# Patient Record
Sex: FEMALE | Race: BLACK OR AFRICAN AMERICAN | Employment: UNEMPLOYED | URBAN - NONMETROPOLITAN AREA
[De-identification: names, ages, dates, MRNs, and addresses within clinical notes are randomized per-mention and may not be internally consistent; named-entity substitution may affect disease eponyms.]

---

## 2024-11-02 ENCOUNTER — APPOINTMENT (OUTPATIENT)
Dept: GENERAL RADIOLOGY | Age: 34
DRG: 341 | End: 2024-11-02
Payer: MEDICAID

## 2024-11-02 ENCOUNTER — HOSPITAL ENCOUNTER (INPATIENT)
Age: 34
LOS: 3 days | Discharge: NURSING, REHAB OR LONG-TERM CARE | DRG: 341 | End: 2024-11-05
Attending: EMERGENCY MEDICINE | Admitting: ORTHOPAEDIC SURGERY
Payer: MEDICAID

## 2024-11-02 ENCOUNTER — APPOINTMENT (OUTPATIENT)
Dept: CT IMAGING | Age: 34
DRG: 341 | End: 2024-11-02
Payer: MEDICAID

## 2024-11-02 DIAGNOSIS — T07.XXXA ABRASIONS OF MULTIPLE SITES: ICD-10-CM

## 2024-11-02 DIAGNOSIS — S39.93XA: Primary | ICD-10-CM

## 2024-11-02 DIAGNOSIS — S32.301A CLOSED FRACTURE OF RIGHT ILIAC WING, INITIAL ENCOUNTER (HCC): ICD-10-CM

## 2024-11-02 DIAGNOSIS — S32.592A CLOSED FRACTURE OF SINGLE PUBIC RAMUS OF PELVIS, LEFT, INITIAL ENCOUNTER (HCC): ICD-10-CM
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
## 2024-11-02 DIAGNOSIS — T07.XXXA MULTIPLE CONTUSIONS: ICD-10-CM

## 2024-11-02 PROBLEM — S80.211A ABRASION OF KNEE, BILATERAL: Status: ACTIVE | Noted: 2024-11-02

## 2024-11-02 PROBLEM — S32.592S CLOSED FRACTURE OF MULTIPLE PUBIC RAMI, LEFT, SEQUELA: Status: ACTIVE | Noted: 2024-11-02

## 2024-11-02 PROBLEM — S50.312A: Status: ACTIVE | Noted: 2024-11-02

## 2024-11-02 PROBLEM — S50.311A: Status: ACTIVE | Noted: 2024-11-02

## 2024-11-02 PROBLEM — S32.810A MULTIPLE CLOSED FRACTURES OF PELVIS WITH STABLE DISRUPTION OF PELVIC RING, INITIAL ENCOUNTER (HCC): Status: ACTIVE | Noted: 2024-11-02

## 2024-11-02 PROBLEM — S20.311A ABRASION OF RIGHT CHEST WALL: Status: ACTIVE | Noted: 2024-11-02

## 2024-11-02 PROBLEM — S80.212A ABRASION OF KNEE, BILATERAL: Status: ACTIVE | Noted: 2024-11-02

## 2024-11-02 PROCEDURE — 99222 1ST HOSP IP/OBS MODERATE 55: CPT | Performed by: SURGERY

## 2024-11-02 PROCEDURE — 6360000002 HC RX W HCPCS

## 2024-11-02 PROCEDURE — 72170 X-RAY EXAM OF PELVIS: CPT

## 2024-11-02 PROCEDURE — 73564 X-RAY EXAM KNEE 4 OR MORE: CPT

## 2024-11-02 PROCEDURE — 90715 TDAP VACCINE 7 YRS/> IM: CPT

## 2024-11-02 PROCEDURE — 96374 THER/PROPH/DIAG INJ IV PUSH: CPT

## 2024-11-02 PROCEDURE — 71045 X-RAY EXAM CHEST 1 VIEW: CPT

## 2024-11-02 PROCEDURE — 2580000003 HC RX 258: Performed by: PHYSICIAN ASSISTANT

## 2024-11-02 PROCEDURE — 1200000000 HC SEMI PRIVATE

## 2024-11-02 PROCEDURE — 99285 EMERGENCY DEPT VISIT HI MDM: CPT

## 2024-11-02 PROCEDURE — 73552 X-RAY EXAM OF FEMUR 2/>: CPT

## 2024-11-02 PROCEDURE — 6370000000 HC RX 637 (ALT 250 FOR IP): Performed by: OCCUPATIONAL THERAPIST

## 2024-11-02 PROCEDURE — 90471 IMMUNIZATION ADMIN: CPT

## 2024-11-02 PROCEDURE — 73110 X-RAY EXAM OF WRIST: CPT

## 2024-11-02 PROCEDURE — 6820000001 HC L2 TRAUMA SURGERY EVALUATION: Performed by: SURGERY

## 2024-11-02 PROCEDURE — 73590 X-RAY EXAM OF LOWER LEG: CPT

## 2024-11-02 PROCEDURE — 6370000000 HC RX 637 (ALT 250 FOR IP): Performed by: PHYSICIAN ASSISTANT

## 2024-11-02 RX ORDER — HYDROCODONE BITARTRATE AND ACETAMINOPHEN 5; 325 MG/1; MG/1
2 TABLET ORAL EVERY 4 HOURS PRN
Status: DISCONTINUED | OUTPATIENT
Start: 2024-11-02 | End: 2024-11-04

## 2024-11-02 RX ORDER — HYDROCODONE BITARTRATE AND ACETAMINOPHEN 5; 325 MG/1; MG/1
1 TABLET ORAL EVERY 4 HOURS PRN
Status: DISCONTINUED | OUTPATIENT
Start: 2024-11-02 | End: 2024-11-04

## 2024-11-02 RX ORDER — SODIUM CHLORIDE 9 MG/ML
INJECTION, SOLUTION INTRAVENOUS PRN
Status: DISCONTINUED | OUTPATIENT
Start: 2024-11-02 | End: 2024-11-05 | Stop reason: HOSPADM

## 2024-11-02 RX ORDER — TETANUS AND DIPHTHERIA TOXOIDS ADSORBED 2; 2 [LF]/.5ML; [LF]/.5ML
0.5 INJECTION INTRAMUSCULAR ONCE
Status: DISCONTINUED | OUTPATIENT
Start: 2024-11-02 | End: 2024-11-02 | Stop reason: RX

## 2024-11-02 RX ORDER — OXYCODONE AND ACETAMINOPHEN 5; 325 MG/1; MG/1
1 TABLET ORAL ONCE AS NEEDED
Status: DISCONTINUED | OUTPATIENT
Start: 2024-11-02 | End: 2024-11-02

## 2024-11-02 RX ORDER — FENTANYL CITRATE 50 UG/ML
INJECTION, SOLUTION INTRAMUSCULAR; INTRAVENOUS
Status: COMPLETED
Start: 2024-11-02 | End: 2024-11-02

## 2024-11-02 RX ORDER — MORPHINE SULFATE 2 MG/ML
2 INJECTION, SOLUTION INTRAMUSCULAR; INTRAVENOUS
Status: DISCONTINUED | OUTPATIENT
Start: 2024-11-02 | End: 2024-11-05 | Stop reason: HOSPADM

## 2024-11-02 RX ORDER — NEOMYCIN/BACITRACIN/POLYMYXINB 3.5-400-5K
OINTMENT (GRAM) TOPICAL 2 TIMES DAILY
Status: DISCONTINUED | OUTPATIENT
Start: 2024-11-02 | End: 2024-11-05 | Stop reason: HOSPADM

## 2024-11-02 RX ORDER — POLYETHYLENE GLYCOL 3350 17 G/17G
17 POWDER, FOR SOLUTION ORAL DAILY PRN
Status: DISCONTINUED | OUTPATIENT
Start: 2024-11-02 | End: 2024-11-05 | Stop reason: HOSPADM

## 2024-11-02 RX ORDER — ACETAMINOPHEN 325 MG/1
650 TABLET ORAL EVERY 6 HOURS
Status: DISCONTINUED | OUTPATIENT
Start: 2024-11-02 | End: 2024-11-05 | Stop reason: HOSPADM

## 2024-11-02 RX ORDER — ONDANSETRON 2 MG/ML
4 INJECTION INTRAMUSCULAR; INTRAVENOUS EVERY 6 HOURS PRN
Status: DISCONTINUED | OUTPATIENT
Start: 2024-11-02 | End: 2024-11-05 | Stop reason: HOSPADM

## 2024-11-02 RX ORDER — ONDANSETRON 4 MG/1
4 TABLET, ORALLY DISINTEGRATING ORAL EVERY 8 HOURS PRN
Status: DISCONTINUED | OUTPATIENT
Start: 2024-11-02 | End: 2024-11-05 | Stop reason: HOSPADM

## 2024-11-02 RX ORDER — SODIUM CHLORIDE 0.9 % (FLUSH) 0.9 %
5-40 SYRINGE (ML) INJECTION EVERY 12 HOURS SCHEDULED
Status: DISCONTINUED | OUTPATIENT
Start: 2024-11-02 | End: 2024-11-05 | Stop reason: HOSPADM

## 2024-11-02 RX ORDER — FENTANYL CITRATE 50 UG/ML
75 INJECTION, SOLUTION INTRAMUSCULAR; INTRAVENOUS ONCE
Status: COMPLETED | OUTPATIENT
Start: 2024-11-02 | End: 2024-11-02

## 2024-11-02 RX ORDER — SODIUM CHLORIDE 0.9 % (FLUSH) 0.9 %
5-40 SYRINGE (ML) INJECTION PRN
Status: DISCONTINUED | OUTPATIENT
Start: 2024-11-02 | End: 2024-11-05 | Stop reason: HOSPADM

## 2024-11-02 RX ADMIN — BACITRACIN ZINC NEOMYCIN SULFATE POLYMYXIN B SULFATE: 400; 3.5; 5 OINTMENT TOPICAL at 19:57

## 2024-11-02 RX ADMIN — HYDROCODONE BITARTRATE AND ACETAMINOPHEN 1 TABLET: 5; 325 TABLET ORAL at 20:42

## 2024-11-02 RX ADMIN — HYDROCODONE BITARTRATE AND ACETAMINOPHEN 2 TABLET: 5; 325 TABLET ORAL at 16:26

## 2024-11-02 RX ADMIN — FENTANYL CITRATE 75 MCG: 50 INJECTION, SOLUTION INTRAMUSCULAR; INTRAVENOUS at 08:54

## 2024-11-02 RX ADMIN — SODIUM CHLORIDE, PRESERVATIVE FREE 10 ML: 5 INJECTION INTRAVENOUS at 19:58

## 2024-11-02 RX ADMIN — TETANUS TOXOID, REDUCED DIPHTHERIA TOXOID AND ACELLULAR PERTUSSIS VACCINE, ADSORBED 0.5 ML: 5; 2.5; 8; 8; 2.5 SUSPENSION INTRAMUSCULAR at 09:15

## 2024-11-02 RX ADMIN — HYDROCODONE BITARTRATE AND ACETAMINOPHEN 2 TABLET: 5; 325 TABLET ORAL at 11:45

## 2024-11-02 ASSESSMENT — PAIN - FUNCTIONAL ASSESSMENT
PAIN_FUNCTIONAL_ASSESSMENT: 0-10
PAIN_FUNCTIONAL_ASSESSMENT: PREVENTS OR INTERFERES SOME ACTIVE ACTIVITIES AND ADLS
PAIN_FUNCTIONAL_ASSESSMENT: 0-10
PAIN_FUNCTIONAL_ASSESSMENT: PREVENTS OR INTERFERES SOME ACTIVE ACTIVITIES AND ADLS
PAIN_FUNCTIONAL_ASSESSMENT: 0-10

## 2024-11-02 ASSESSMENT — PAIN SCALES - GENERAL
PAINLEVEL_OUTOF10: 8
PAINLEVEL_OUTOF10: 7
PAINLEVEL_OUTOF10: 10
PAINLEVEL_OUTOF10: 6
PAINLEVEL_OUTOF10: 5
PAINLEVEL_OUTOF10: 4
PAINLEVEL_OUTOF10: 7

## 2024-11-02 ASSESSMENT — PAIN DESCRIPTION - ONSET: ONSET: ON-GOING

## 2024-11-02 ASSESSMENT — PATIENT HEALTH QUESTIONNAIRE - PHQ9
SUM OF ALL RESPONSES TO PHQ QUESTIONS 1-9: 0
2. FEELING DOWN, DEPRESSED OR HOPELESS: NOT AT ALL
SUM OF ALL RESPONSES TO PHQ QUESTIONS 1-9: 0
SUM OF ALL RESPONSES TO PHQ9 QUESTIONS 1 & 2: 0
SUM OF ALL RESPONSES TO PHQ QUESTIONS 1-9: 0
1. LITTLE INTEREST OR PLEASURE IN DOING THINGS: NOT AT ALL
SUM OF ALL RESPONSES TO PHQ QUESTIONS 1-9: 0

## 2024-11-02 ASSESSMENT — LIFESTYLE VARIABLES
HOW MANY STANDARD DRINKS CONTAINING ALCOHOL DO YOU HAVE ON A TYPICAL DAY: 3 OR 4
HOW OFTEN DO YOU HAVE A DRINK CONTAINING ALCOHOL: 2-4 TIMES A MONTH

## 2024-11-02 ASSESSMENT — PAIN DESCRIPTION - DIRECTION: RADIATING_TOWARDS: DOWN BILAT LEGS

## 2024-11-02 ASSESSMENT — PAIN DESCRIPTION - LOCATION
LOCATION: PELVIS
LOCATION: SACRUM;HIP
LOCATION: HIP;SACRUM
LOCATION: PELVIS
LOCATION: HIP;LEG

## 2024-11-02 ASSESSMENT — PAIN DESCRIPTION - FREQUENCY: FREQUENCY: CONTINUOUS

## 2024-11-02 ASSESSMENT — PAIN DESCRIPTION - ORIENTATION
ORIENTATION: RIGHT;LEFT
ORIENTATION: LEFT;RIGHT
ORIENTATION: RIGHT;LEFT
ORIENTATION: LEFT

## 2024-11-02 ASSESSMENT — PAIN DESCRIPTION - DESCRIPTORS
DESCRIPTORS: ACHING;PENETRATING;PRESSURE
DESCRIPTORS: THROBBING;SHARP;SPASM
DESCRIPTORS: ACHING

## 2024-11-02 ASSESSMENT — PAIN DESCRIPTION - PAIN TYPE
TYPE: ACUTE PAIN
TYPE: ACUTE PAIN

## 2024-11-02 NOTE — ED PROVIDER NOTES
PATIENT NAME: Linda Don  MRN: [id number]  : 1990  CORDERO: 2024    I performed a history and physical examination of the patient and discussed management with the Resident. I reviewed the Resident's note and agree with the documented findings and plan of care. Any areas of disagreement are noted on the chart. I was personally present for the key portions of any procedures and have documented in the chart those procedures where I was not present during the key portions. I have reviewed the emergency nurses triage note and agree with the chief complaint, past medical history, past surgical history, allergies, medications, social and family history as documented unless otherwise noted below.    MEDICAL DEDISION MAKING (MDM)     Linda Don is a 34 y.o. female who presents to Emergency Department with Fall and Trauma     A 34-year-old female who is brought in by EMS as an ED-ED trauma transfer from [facility].  Patient fell about 12 steps down from top of the stairs around 4-5 a.m. today and was found to have sustained left inferior and superior pubic rami fracture as well as right iliac fracture with extension to the right sacrum. Not on OAC. CTs head, cervical spine, abdomen/pelvis with L-spine recon were done at OSH. CTs head and C-spine are negative.  CT abdomen pelvis are negative for intra-abdominal or pelvic injuries.  GCS 15 on arrival.  Vital signs stable.  Negative primary survey.     Exam: AVSS.  The heart and the lungs are unremarkable, soft abdomen with left lower quadrant and left pelvis tenderness.  No open book sign.  Left hip tenderness.  Bilateral lower extremities demonstrate intact neurovascular exam.  Tenderness and abrasion to left wrist and bilateral anterior knees.     CT images from [facility] are loaded to our PACS and are reviewed by me.  CT abdomen/pelvis with lumbar spine recon is overread by our radiologist.    Multiple x-rays including chest x-ray, left wrist, left femur,  left tib-fib, and the right knee are obtained which are negative for fractures.     Trauma labs done at OSH are unremarkable.     D/w Trauma (trauma consult) and Ortho. Admitted by Ortho.     ED treatment includes pain control, tetanus booster, and wound care.  Hemodynamically stable during ED stay.     Vitals:    11/02/24 0833 11/02/24 0923 11/02/24 0927 11/02/24 0958   BP: 124/74 120/72 108/74 107/71   Pulse: 93 86 81 82   Resp: 12 20 13 14   Temp: 98.6 °F (37 °C)      TempSrc: Oral      SpO2: 99% 98% 99% 97%   Weight: 54 kg (119 lb)      Height: 1.575 m (5' 2\")        Labs Reviewed - No data to display  XR WRIST LEFT (MIN 3 VIEWS)   Final Result   Normal wrist.            **This report has been created using voice recognition software.  It may contain   minor errors which are inherent in voice recognition technology.**            Electronically signed by Dr. Hoang Rm      XR TIBIA FIBULA LEFT (2 VIEWS)   Final Result   Normal study.            **This report has been created using voice recognition software.  It may contain   minor errors which are inherent in voice recognition technology.**            Electronically signed by Dr. Hoang Rm      XR KNEE RIGHT (MIN 4 VIEWS)   Final Result   Soft tissue swelling. No fracture.            **This report has been created using voice recognition software.  It may contain   minor errors which are inherent in voice recognition technology.**                  Electronically signed by Dr. Hoang Rm      XR FEMUR LEFT (MIN 2 VIEWS)   Final Result   Normal femur.            **This report has been created using voice recognition software.  It may contain   minor errors which are inherent in voice recognition technology.**      Electronically signed by Dr. Hoang Rm      XR CHEST PORTABLE   Final Result   Normal mobile chest.            **This report has been created using voice recognition software.  It may contain   minor errors which are inherent in voice

## 2024-11-02 NOTE — PROGRESS NOTES
Pt admitted to  [facility] 06 from ED.     Complaints: pelvic pain.      IV normal saline infusing into the antecubital left, condition patent and no redness at a rate of gravity. with about 300 mls in the bag still. IV site free of s/s of infection or infiltration. Vital signs obtained. Assessment and data collection initiated.     Two nurse skin assessment performed by Katlyn TIWARI and Annetta DILLON. Oriented to room.     Explained patients right to have family, representative or physician notified of their admission.  Patient has Declined for physician to be notified.  Patient has Declined for family/representative to be notified.    The patient is interested in [facility] meds to beds program?:  No    Policies and procedures for 7 explained. All questions answered with no further questions at this time. Fall prevention and safety brochure discussed with patient.  Bed alarm on. Call light in reach.

## 2024-11-02 NOTE — ED NOTES
ED to inpatient nurses report      Chief Complaint:  Chief Complaint   Patient presents with    Fall    Trauma     Present to ED from: [facility]    MOA:     LOC: alert and orientated to name, place, date  Mobility: Requires assistance * 2  Oxygen Baseline: none    Current needs required: none     Code Status:   Full Code    What abnormal results were found and what did you give/do to treat them? Multiple pelvic fractures  Any procedures or intervention occur?     Mental Status:  Level of Consciousness: Responds to voice (1)    Psych Assessment:        Vitals:  Patient Vitals for the past 24 hrs:   BP Temp Temp src Pulse Resp SpO2 Height Weight   11/02/24 1028 105/71 -- -- 80 12 98 % -- --   11/02/24 0958 107/71 -- -- 82 14 97 % -- --   11/02/24 0927 108/74 -- -- 81 13 99 % -- --   11/02/24 0923 120/72 -- -- 86 20 98 % -- --   11/02/24 0833 124/74 98.6 °F (37 °C) Oral 93 12 99 % 1.575 m (5' 2\") 54 kg (119 lb)        LDAs:   Peripheral IV Left;Proximal Antecubital (Active)       Ambulatory Status:  No data recorded    Diagnosis:  DISPOSITION Admitted 11/02/2024 10:23:07 AM   Final diagnoses:   Traumatic injury of pelvis   Closed fracture of right iliac wing, initial encounter ([facility])   Closed fracture of single pubic ramus of pelvis, left, initial encounter ([facility])   Fall down stairs, initial encounter   Abrasions of multiple sites   Multiple contusions        Consults:  None     Pain Score:  Pain Assessment  Pain Assessment: 0-10  Pain Level: 4  Patient's Stated Pain Goal: 4  Pain Location: Hip, Sacrum  Pain Orientation: Right, Left  Pain Descriptors: Throbbing, Sharp, Spasm  Pain Type: Acute pain    C-SSRS:   Risk of Suicide: No Risk    Sepsis Screening:       Model Fall Risk:       Swallow Screening        Preferred Language:   English      ALLERGIES     Patient has no known allergies.    SURGICAL HISTORY     History reviewed. No pertinent surgical history.    PAST MEDICAL HISTORY     History reviewed. No pertinent

## 2024-11-02 NOTE — ED TRIAGE NOTES
Pt to [facility] as a trauma transfer. [place] was [place] the [place] and drinking. She got home around 0400 and she was turning the lights off and fell down the stairs. Pt fell down approximately 12 stairs. She hit her head but denies LOC. Upon arrival to our ER, pt is alert and oriented. She is having severe pain in right hip area. Pt is able to move toes, bilateral pedal pulses noted. Dr. Cervantes and Dr. Grimm at bedside.

## 2024-11-02 NOTE — PROGRESS NOTES
Brief Intervention and Referral to Treatment Summary    Patient was provided PHQ-9, AUDIT-C and DAST Screening:      PHQ-9 Score: 0  AUDIT-C Score:  3  DAST Score:  0    Patient’s substance use is considered     Moderate Risk      Patient’s depression is considered:     Minimal    Brief Education Was Provided    Patient was receptive      Brief Intervention Is Provided (Only for AUDIT-C or DAST)     Patient reports readiness to decrease and/or stop use and a plan was discussed     Injured Trauma Survivor Screening  1.  When you were injured or right afterward   Did you think you were going to die? RESPONSES; YES+1/NO: NO  Do you think this was done to you intentionally? NO    Since your injury  Have you felt more restless, tense or jumpy than usual? RESPONSES; YES+1/NO: NO  Have you found yourself unable to stop worrying? RESPONSES; YES+1/NO: NO  Do you find yourself thinking that the world is unsafe and that people are not to be trusted? RESPONSES; YES+1/NO: NO    TOTAL SCORE from ITSS Questions 1 and 2: 0  NOTE: A score of greater than or equal to 2 is considered positive for PTSD risk and is to receive a community resource packet to link with appropriate providers.    Recommendations/Referrals for Brief and/or Specialized Treatment Provided to Patient:

## 2024-11-02 NOTE — ED NOTES
Pt transported to [facility] on cart in stable condition. Floor contacted before transport and spoke with Ruth.

## 2024-11-02 NOTE — H&P
palpation of the lateral aspect of the hip.  Some tenderness with palpation to the groin also.  Minimal to no pain with logroll.  No tenderness to the knee.  Band-Aids in place overlying superficial wounds to the knee.  TA/EHL/FHL/GS motor intact.  DP/SP/S SI OT.  Foot well-perfused.  RLE: Band-Aids in place overlying superficial wounds to the knee.  There is minimal really to no tenderness at all with palpation of the right hip.  There is some tenderness present to the posterior aspect near the SI joint at the site of her iliac wing fracture.  No pain with logroll.  No tenderness to the knee.  TA/EHL/FHL/GS motor intact.  DP/SP/S SI LT.  Foot well-perfused.  LUE: No tenderness to the wrist.  Small superficial abrasions present.  Radial, median, ulnar nerve functions intact without paresthesias.  Hand well-perfused.      LABS:  No results for input(s): \"WBC\", \"HGB\", \"HCT\", \"PLT\", \"INR\", \"NA\", \"K\", \"BUN\", \"CREATININE\", \"GLUCOSE\" in the last 72 hours.    Invalid input(s): \"PT\", \"PTT\"     Radiology:   Pelvis XR    Large amount of contrast material is present in the bladder, partially  obscuring the pelvis. There is a known fracture involving the  posterior/medial/superior aspect of the right iliac wing which cannot be  appreciated on this plain film. There is also a mildly distracted fracture  involving the left inferior pubic ramus and a mildly displaced mildly comminuted  fracture of the left superior pubic ramus.   Wrist XR  Normal wrist.   Tib/Fib XR  Normal study.   Knee XR  Soft tissue swelling. No fracture.   Femur XR  Normal femur.   CTAP  1. Uterine fibroid. Bilateral ovarian cyst. Findings of constipation and colonic  ileus.  2. Nondisplaced fracture of the right iliac bone superiorly, posteriorly,  medially. Nondisplaced comminuted fracture left superior pubic ramus.  Nondisplaced acute fracture left inferior pubic ramus.  3. Findings of soft tissue contusion left flank posteriorly.    A/P: 34 y.o. female

## 2024-11-02 NOTE — ED NOTES
Pt resting on cot with eyes closed at this time. Respirations easy and unlabored. Pt reports pain of a 4/10 at this time. VSS. Pt updated on plan of care.

## 2024-11-02 NOTE — CONSULTS
Trauma Consult     Patient:  Linda Don  Admit date: 11/2/2024   YOB: 1990 Date of Evaluation: 11/2/2024  MRN: [id number]  Acct: [id number]    Injury Date:11/2/24  Injury time:4:00 am   PCP: No primary care provider on file.   Referring physician: Dr. Cervantes    Time of Trauma Surgeon Notification:  8:33  Time of CHAPITO Arrival: 8:52  Time of Trauma Surgeon Arrival:  1800    Assessment:    Principal Problem:    Multiple closed fractures of pelvis with stable disruption of pelvic ring, initial encounter ([facility])  Active Problems:    Abrasion of knee, bilateral    Abrasion of right chest wall    Abrasion of elbow, right, initial encounter    Abrasion of elbow, left, initial encounter  Resolved Problems:    * No resolved hospital problems. *    Plan:    Admitted by orthopedics    Trauma by fall  - Fall precautions  - Rec PT/OT to see once wb status clarified   - CT head, C spine, chest, abdomen, and spine negative for injury     Right sacral and left inf.sup pubic rami fractures   - CT interp reads  There is an acute mildly comminuted fracture involving the left superior pubic ramus. The major bone fragments are maintained in near normal anatomic alignment. There is also a nondisplaced fracture of the inferior pubic ramus. nondisplaced fracture is also present involving the right iliac bone superiorly and medially along the superior/posterior aspect aspect of the right sacroiliac joint. The sacral alae is intact. There is mild soft tissue stranding of the subcutaneous adipose tissues in the left flank, consistent with soft tissue contusion.   - Pain control   - Consult ortho   - Ortho to admit and manage    - Neurovascular checks     Multiple skin abrasions   - To forehead, R chest wall, bilat elbow and knees   - Local wound care   - Abx ointment BID with dry dressing as needed   - Proper hygiene     Trauma to sign off. Please contact for future needs while in house.     Activation:    [] Level I (Trauma

## 2024-11-02 NOTE — ED PROVIDER NOTES
[facility] EMERGENCY DEPARTMENT    EMERGENCY MEDICINE     Patient Name: Linda Don  MRN: [id number]  YOB: 1990  Date of Evaluation: 11/2/2024  Treating Resident Physician: Bryon Grimm DO  Supervising Physician: Dr. Clark Cervantes MD    CHIEF COMPLAINT       Chief Complaint   Patient presents with    Fall    Trauma       HISTORY OF PRESENT ILLNESS      History obtained from the patient.    Linda Don is a 34 y.o. female who presents to the emergency department from [facility] brought in by EMS for evaluation of fall evaluation.  Patient reports that she was at the casino last night drinking alcohol, came home around 4 AM went up the stairs as she was turning off the lights she slipped and fell down 12 stairs hitting her head but no loss of consciousness.  Fall was not witnessed.  When she landed on the ground she called for her child's father that was in the house to come and take her to the hospital.  She has no previous surgeries or fractures.  No significant medical history.  Is not on any medications.  She was seen at outside facility where they did labs, CT head and neck and x-rays of the pelvis and was transferred to our facility for trauma evaluations with multiple pelvic fractures.  Patient is currently stable, airway intact, bilateral breath sounds pulses intact throughout, hemodynamically stable.  She received morphine last at 7 AM for pain control.  Has some skin abrasions but no obvious lacerations no active bleeding.    Pertinent previous and/or external records reviewed: Outside facility    PAST MEDICAL AND SURGICAL HISTORY   History reviewed. No pertinent past medical history.    History reviewed. No pertinent surgical history.    CURRENT MEDICATIONS     Previous Medications    No medications on file       ALLERGIES   No Known Allergies    FAMILY HISTORY   History reviewed. No pertinent family history.    SOCIAL HISTORY     Social History     Tobacco Use    Smoking status: Every  contain   minor errors which are inherent in voice recognition technology.**         Electronically signed by Dr. Hoang Rm      CT INTERPRETATION OF OUTSIDE IMAGES    (Results Pending)   CT INTERPRETATION OF OUTSIDE IMAGES    (Results Pending)       LABS: (none if blank)  Labs Reviewed - No data to display  (A negative COVID-19 test should be interpreted as COVID no longer suspected unless otherwise noted in this encounter documentation note)  (Any cultures that may have been sent were not resulted at the time of this patient ED visit)    MEDICAL DECISION MAKING     Initial Differential: Includes but is not limited to pelvic fracture    MDM/Assessment     Medical Decision Making  34-year-old female presented from outside facility for trauma evaluation.  Around 4 AM patient fell 12 steps at home, she reports being intoxicated with alcohol during the time.  Fall was unwitnessed.  No loss of consciousness.  Patient has multiple pelvic fractures and abrasions of the left elbow and bilateral knee that do not require repair.  They were cleaned and wrapped by nursing staff.  CT from outside facilities were interpreted with our radiologist and x-ray of the chest, right wrist and right knee knee and left tib-fib that showed no pelvic fractures fracture or dislocation.  Labs were taken from outside facility with an H&H of 11/34.7, platelets 265.  Serum pregnancy negative.  Tdap updated.  Trauma team evaluated patient in the ED.  Ortho pediatric surgery consulted patient's with her acute pelvic fractures and accepted patient to their service.  Patient pain control with fentanyl stable for transport to inpatient.    ED COURSE   ED Medications administered this visit (None if left blank):   Medications   oxyCODONE-acetaminophen (PERCOCET) 5-325 MG per tablet 1 tablet (has no administration in time range)   fentaNYL (SUBLIMAZE) injection 75 mcg (75 mcg IntraVENous Given 11/2/24 1727)   Tetanus-Diphth-Acell Pertussis

## 2024-11-03 LAB
ANION GAP SERPL CALC-SCNC: 10 MEQ/L (ref 8–16)
BASOPHILS ABSOLUTE: 0 THOU/MM3 (ref 0–0.1)
BASOPHILS NFR BLD AUTO: 0.2 %
BUN SERPL-MCNC: 5 MG/DL (ref 7–22)
CALCIUM SERPL-MCNC: 8.3 MG/DL (ref 8.5–10.5)
CHLORIDE SERPL-SCNC: 102 MEQ/L (ref 98–111)
CO2 SERPL-SCNC: 22 MEQ/L (ref 23–33)
CREAT SERPL-MCNC: 0.5 MG/DL (ref 0.4–1.2)
DEPRECATED RDW RBC AUTO: 52 FL (ref 35–45)
EOSINOPHIL NFR BLD AUTO: 1.6 %
EOSINOPHILS ABSOLUTE: 0.1 THOU/MM3 (ref 0–0.4)
ERYTHROCYTE [DISTWIDTH] IN BLOOD BY AUTOMATED COUNT: 15.2 % (ref 11.5–14.5)
GFR SERPL CREATININE-BSD FRML MDRD: > 90 ML/MIN/1.73M2
GLUCOSE SERPL-MCNC: 96 MG/DL (ref 70–108)
HCT VFR BLD AUTO: 32.7 % (ref 37–47)
HGB BLD-MCNC: 10.3 GM/DL (ref 12–16)
IMM GRANULOCYTES # BLD AUTO: 0.05 THOU/MM3 (ref 0–0.07)
IMM GRANULOCYTES NFR BLD AUTO: 0.5 %
LYMPHOCYTES ABSOLUTE: 1.8 THOU/MM3 (ref 1–4.8)
LYMPHOCYTES NFR BLD AUTO: 19.7 %
MCH RBC QN AUTO: 29.3 PG (ref 26–33)
MCHC RBC AUTO-ENTMCNC: 31.5 GM/DL (ref 32.2–35.5)
MCV RBC AUTO: 93.2 FL (ref 81–99)
MONOCYTES ABSOLUTE: 1.2 THOU/MM3 (ref 0.4–1.3)
MONOCYTES NFR BLD AUTO: 12.7 %
NEUTROPHILS ABSOLUTE: 5.9 THOU/MM3 (ref 1.8–7.7)
NEUTROPHILS NFR BLD AUTO: 65.3 %
NRBC BLD AUTO-RTO: 0 /100 WBC
PLATELET # BLD AUTO: 213 THOU/MM3 (ref 130–400)
PMV BLD AUTO: 11.5 FL (ref 9.4–12.4)
POTASSIUM SERPL-SCNC: 4 MEQ/L (ref 3.5–5.2)
RBC # BLD AUTO: 3.51 MILL/MM3 (ref 4.2–5.4)
SODIUM SERPL-SCNC: 134 MEQ/L (ref 135–145)
WBC # BLD AUTO: 9.1 THOU/MM3 (ref 4.8–10.8)

## 2024-11-03 PROCEDURE — 80048 BASIC METABOLIC PNL TOTAL CA: CPT

## 2024-11-03 PROCEDURE — 2580000003 HC RX 258: Performed by: PHYSICIAN ASSISTANT

## 2024-11-03 PROCEDURE — 97162 PT EVAL MOD COMPLEX 30 MIN: CPT

## 2024-11-03 PROCEDURE — 97530 THERAPEUTIC ACTIVITIES: CPT

## 2024-11-03 PROCEDURE — 6360000002 HC RX W HCPCS: Performed by: PHYSICIAN ASSISTANT

## 2024-11-03 PROCEDURE — 97166 OT EVAL MOD COMPLEX 45 MIN: CPT

## 2024-11-03 PROCEDURE — 6370000000 HC RX 637 (ALT 250 FOR IP): Performed by: PHYSICIAN ASSISTANT

## 2024-11-03 PROCEDURE — 36415 COLL VENOUS BLD VENIPUNCTURE: CPT

## 2024-11-03 PROCEDURE — 1200000000 HC SEMI PRIVATE

## 2024-11-03 PROCEDURE — 51701 INSERT BLADDER CATHETER: CPT

## 2024-11-03 PROCEDURE — 85025 COMPLETE CBC W/AUTO DIFF WBC: CPT

## 2024-11-03 PROCEDURE — 51798 US URINE CAPACITY MEASURE: CPT

## 2024-11-03 RX ORDER — ENOXAPARIN SODIUM 100 MG/ML
40 INJECTION SUBCUTANEOUS DAILY
Status: DISCONTINUED | OUTPATIENT
Start: 2024-11-03 | End: 2024-11-05 | Stop reason: HOSPADM

## 2024-11-03 RX ADMIN — HYDROCODONE BITARTRATE AND ACETAMINOPHEN 2 TABLET: 5; 325 TABLET ORAL at 21:27

## 2024-11-03 RX ADMIN — SODIUM CHLORIDE, PRESERVATIVE FREE 10 ML: 5 INJECTION INTRAVENOUS at 09:29

## 2024-11-03 RX ADMIN — HYDROCODONE BITARTRATE AND ACETAMINOPHEN 2 TABLET: 5; 325 TABLET ORAL at 04:02

## 2024-11-03 RX ADMIN — BACITRACIN ZINC NEOMYCIN SULFATE POLYMYXIN B SULFATE: 400; 3.5; 5 OINTMENT TOPICAL at 09:28

## 2024-11-03 RX ADMIN — MORPHINE SULFATE 2 MG: 2 INJECTION, SOLUTION INTRAMUSCULAR; INTRAVENOUS at 12:24

## 2024-11-03 RX ADMIN — SODIUM CHLORIDE, PRESERVATIVE FREE 10 ML: 5 INJECTION INTRAVENOUS at 21:28

## 2024-11-03 RX ADMIN — ENOXAPARIN SODIUM 40 MG: 100 INJECTION SUBCUTANEOUS at 09:25

## 2024-11-03 RX ADMIN — HYDROCODONE BITARTRATE AND ACETAMINOPHEN 2 TABLET: 5; 325 TABLET ORAL at 15:43

## 2024-11-03 RX ADMIN — HYDROCODONE BITARTRATE AND ACETAMINOPHEN 2 TABLET: 5; 325 TABLET ORAL at 09:23

## 2024-11-03 ASSESSMENT — PAIN DESCRIPTION - ORIENTATION
ORIENTATION: LEFT
ORIENTATION: LEFT;RIGHT
ORIENTATION: LEFT
ORIENTATION: LEFT;RIGHT
ORIENTATION: LEFT

## 2024-11-03 ASSESSMENT — PAIN SCALES - GENERAL
PAINLEVEL_OUTOF10: 7
PAINLEVEL_OUTOF10: 6
PAINLEVEL_OUTOF10: 8
PAINLEVEL_OUTOF10: 8

## 2024-11-03 ASSESSMENT — PAIN DESCRIPTION - LOCATION
LOCATION: HIP;LEG
LOCATION: HIP;LEG
LOCATION: HIP
LOCATION: PELVIS
LOCATION: HIP

## 2024-11-03 ASSESSMENT — PAIN DESCRIPTION - DESCRIPTORS
DESCRIPTORS: ACHING

## 2024-11-03 ASSESSMENT — PAIN - FUNCTIONAL ASSESSMENT
PAIN_FUNCTIONAL_ASSESSMENT: PREVENTS OR INTERFERES SOME ACTIVE ACTIVITIES AND ADLS

## 2024-11-03 ASSESSMENT — PAIN DESCRIPTION - ONSET: ONSET: ON-GOING

## 2024-11-03 ASSESSMENT — PAIN DESCRIPTION - FREQUENCY: FREQUENCY: CONTINUOUS

## 2024-11-03 ASSESSMENT — PAIN DESCRIPTION - PAIN TYPE: TYPE: ACUTE PAIN

## 2024-11-03 NOTE — PROGRESS NOTES
[facility]  INPATIENT OCCUPATIONAL THERAPY  [facility]  EVALUATION      Discharge Recommendations: IP Rehab  Patient is not at baseline function and requires continued therapy. Patient would benefit from continued therapy on an inpatient rehab unit. Patient is able to tolerate 3 hours of intensive therapy 5-6 days/week. Without inpatient rehabilitation pt at risk for functional decline, increased falls, and readmission to hospital.    Equipment Recommendations:   Continue to assess pending progress and discharge location.  Pt has potential need for wheelchair, BSC and tub transfer bench.       Time In: 1450  Time Out: 1513  Timed Code Treatment Minutes: 8 Minutes  Minutes: 23      Date: 11/3/2024  Patient Name: Linda Don,   Gender: female      MRN: [id number]  : 1990  (34 y.o.)  Referring Practitioner: Carlos Schaffer PA-C  Diagnosis: Multiple closed fractures of pelvis with stable disruption of pelvic ring, initial encounter ([facility])  Additional Pertinent Hx: Per EMR:The patient is a 34 y.o. female with who presented to [facility] emergency department earlier this morning after a transfer from [facility] secondary to pelvic fractures noted.  She had a fall early this morning as she returned home from a night out.  She fell down a flight of stairs that was approximately 12 steps.  Describes pain more so to this left hip.Imaging revealing pt with right iliac wing and left superior and inferior pubic rami fractures    Restrictions/Precautions:  Restrictions/Precautions: Fall Risk, Weight Bearing  Right Lower Extremity Weight Bearing: Non Weight Bearing  Left Lower Extremity Weight Bearing: Weight Bearing As Tolerated    Subjective  Chart Reviewed: Yes, Orders, Progress Notes, History and Physical  Patient assessed for rehabilitation services?: Yes    Subjective: RN approved OT evaluation. Pt seated in bedside recliner, agreeable to OT and asking if she can return to  bed after sitting up in the chair for >2 hours. Pt requested pain meds during session.    Pain: 9 /10: left lower leg > right lower leg     Vitals: Vitals not assessed per clinical judgement, see nursing flowsheet    Social/Functional History:  Lives With: Other (comment) (boyfriend)  Type of Home: Condo  Home Layout: One level  Home Access: Stairs to enter with rails  Entrance Stairs - Number of Steps: 2 flights of steps  Entrance Stairs - Rails: Left   Bathroom Shower/Tub: Walk-in shower  Bathroom Toilet: Standard       ADL Assistance: Independent  Homemaking Assistance: Independent  Ambulation Assistance: Independent  Transfer Assistance: Independent    Active : Yes     Additional Comments: Pt's boyfriend works during the day--his shop is under [employer]    VISION:WNL denies changes to vision since fall.  OT discussed s/s of concussion. Pt reports some light sensitivity, but denies other concussive symptoms at this time.     HEARING:  WNL    COGNITION: WFL    RANGE OF MOTION:  Bilateral Upper Extremity:  WFL    STRENGTH:  Bilateral Upper Extremity: WFL, but would benefit from UB strengthening to improve ability to perform transfers     ADL:   No ADL's completed this session.  OT discussed role of OT POC, goals and progression of activities. Pt agreeable and motivated.     BALANCE:  Sitting Balance:  Stand By Assistance. On EOB   Standing Balance: Contact Guard Assistance. Standing with BUE support on RW     BED MOBILITY:  Sit to Supine: Moderate Assistance assist with BLEs, with increased time for completion   Scooting: Moderate Assistance, X 1, with increased time for completion      TRANSFERS:  Sit to Stand:  Moderate Assistance, X 1, with increased time for completion, with verbal cues.    Stand to Sit: Minimal Assistance, X 1, with increased time for completion, to/from chair without arms.    Stand Pivot: Moderate Assistance, X 1, with increased time for completion. Assist with RLE placement, slow pace

## 2024-11-03 NOTE — PLAN OF CARE
Problem: Discharge Planning  Goal: Discharge to home or other facility with appropriate resources  Outcome: Progressing  Flowsheets (Taken 11/2/2024 2254)  Discharge to home or other facility with appropriate resources:   Identify barriers to discharge with patient and caregiver   Arrange for needed discharge resources and transportation as appropriate   Identify discharge learning needs (meds, wound care, etc)     Problem: Pain  Goal: Verbalizes/displays adequate comfort level or baseline comfort level  Outcome: Progressing  Flowsheets (Taken 11/2/2024 2254)  Verbalizes/displays adequate comfort level or baseline comfort level:   Encourage patient to monitor pain and request assistance   Assess pain using appropriate pain scale   Administer analgesics based on type and severity of pain and evaluate response   Implement non-pharmacological measures as appropriate and evaluate response     Problem: Skin/Tissue Integrity  Goal: Absence of new skin breakdown  Description: 1.  Monitor for areas of redness and/or skin breakdown  2.  Assess vascular access sites hourly  3.  Every 4-6 hours minimum:  Change oxygen saturation probe site  4.  Every 4-6 hours:  If on nasal continuous positive airway pressure, respiratory therapy assess nares and determine need for appliance change or resting period.  Outcome: Progressing     Problem: Safety - Adult  Goal: Free from fall injury  Outcome: Progressing  Flowsheets (Taken 11/2/2024 2254)  Free From Fall Injury: Instruct family/caregiver on patient safety     Care plan reviewed with patient. Patient verbalizes understanding of the plan of care and contributes to goal setting.

## 2024-11-03 NOTE — PROGRESS NOTES
[facility]  INPATIENT PHYSICAL THERAPY  EVALUATION  [facility] ORTHOPEDICS 7K - 7K-06/006-A    Discharge Recommendations: Continue to assess pending progress, Patient would benefit from continued therapy after discharge (patient would benefit from IP rehab if pain managed, not safe to return to home has 2 flights of stairs to enter home)  Equipment Recommendations:  (wheelchair, potential slide board or RW/SW?)               Time In: 1133  Time Out: 1219  Timed Code Treatment Minutes: 30 Minutes  Minutes: 46          Date: 11/3/2024  Patient Name: Linda Don,  Gender:  female        MRN: [id number]  : 1990  (34 y.o.)  Referral Date : 24   Referring Practitioner: Carlos Schaffer PA-C  Diagnosis: Multiple closed fractures of pelvis with stable disruption of pelvic ring, initial encounter ([facility])  Additional Pertinent Hx: Per EMR:The patient is a 34 y.o. female with who presented to [facility] emergency department earlier this morning after a transfer from [facility] secondary to pelvic fractures noted.  She had a fall early this morning as she returned home from a night out.  She fell down a flight of stairs that was approximately 12 steps.  Describes pain more so to this left hip.Imaging revealing pt with right iliac wing and left superior and inferior pubic rami fractures     Restrictions/Precautions:  Restrictions/Precautions: Fall Risk, Weight Bearing  Right Lower Extremity Weight Bearing: Non Weight Bearing  Left Lower Extremity Weight Bearing: Weight Bearing As Tolerated    Subjective:  Chart Reviewed: Yes  Patient assessed for rehabilitation services?: Yes  Family / Caregiver Present: No  Subjective: Patient in room in bed, agreeable to PT.  RN approved PT session.  Per Carlos Schaffer PA-C, pt okay to work with.    General:  Overall Orientation Status: Within Functional Limits  Vision: Impaired  Vision Exceptions: Wears glasses at all times (glasses broke when she  Tested    Stairs:  Not Tested    Exercise:  None    Functional Outcome Measures:  [facility] (6 CLICK) BASIC MOBILITY  AM-[facility] Inpatient Mobility Raw Score : 9  AM-[facility] Inpatient T-Scale Score : 30.55          Modified [place]:  Premorbid Functional Status: Not Applicable  Current Functional Status:  Not Applicable    ASSESSMENT:  Activity Tolerance:  Patient tolerance of treatment:Fair. Limited by pain.  Pt is motivated.    Treatment Initiated: Treatment and education initiated within context of evaluation.  Evaluation time included review of current medical information, gathering information related to past medical, social and functional history, completion of standardized testing, formal and informal observation of tasks, assessment of data and development of plan of care and goals.  Treatment time included skilled education and facilitation of tasks to increase safety and independence with functional mobility for improved independence and quality of life.  Therapeutic activity completed to improve patient's ability to complete functional mobility    Assessment:  Body Structures, Functions, Activity Limitations Requiring Skilled Therapeutic Intervention: Decreased functional mobility , Decreased strength, Decreased endurance, Decreased balance, Increased pain  Assessment: Ms Don is a 34 year old s/p fall resulting in pelvic fractures.  Pt presents with significant pain at this time and is limited by non-weight bearing status right LE.  Patient requires max assist to complete supine to sit transfer with head of bed elevated and with rail.  Pt able to stand to RW with max assist.  Pt unable to attempt stand pivot transfer this session due to pain levels.  Pt has 2 flights of steps to enter the home.  Pt will benefit from skilled PT services for further transfer training and to increase her independence with functional mobility for ability to return to home.  Therapy Prognosis: Good  *pt has 2 flights of steps to enter

## 2024-11-03 NOTE — PROGRESS NOTES
Subjective:   Pain controlled.  Continues to describe more pain to this left side today. Denies numbness or tingling. Afebrile. Hb 10.3.  Patient was straight cathed overnight.  She describes a difficulty with urination.  She was able to urinate on her own a couple of times yesterday.    Objective:   /73   Pulse 78   Temp 98.4 °F (36.9 °C) (Oral)   Resp 16   Ht 1.575 m (5' 2\")   Wt 54 kg (119 lb)   LMP 10/17/2024 (Approximate)   SpO2 100%   BMI 21.77 kg/m²     Recent Labs     11/03/24  0547   WBC 9.1   HGB 10.3*   HCT 32.7*          Current Facility-Administered Medications: neomycin-bacitracin-polymyxin (NEOSPORIN) ointment, , Topical, BID  sodium chloride flush 0.9 % injection 5-40 mL, 5-40 mL, IntraVENous, 2 times per day  sodium chloride flush 0.9 % injection 5-40 mL, 5-40 mL, IntraVENous, PRN  0.9 % sodium chloride infusion, , IntraVENous, PRN  ondansetron (ZOFRAN-ODT) disintegrating tablet 4 mg, 4 mg, Oral, Q8H PRN **OR** ondansetron (ZOFRAN) injection 4 mg, 4 mg, IntraVENous, Q6H PRN  polyethylene glycol (GLYCOLAX) packet 17 g, 17 g, Oral, Daily PRN  acetaminophen (TYLENOL) tablet 650 mg, 650 mg, Oral, Q6H  morphine (PF) injection 2 mg, 2 mg, IntraVENous, Q2H PRN  HYDROcodone-acetaminophen (NORCO) 5-325 MG per tablet 1 tablet, 1 tablet, Oral, Q4H PRN **OR** HYDROcodone-acetaminophen (NORCO) 5-325 MG per tablet 2 tablet, 2 tablet, Oral, Q4H PRN      Exam:  Gen: NAD  LLE: Tenderness palpation of the lateral aspect of the hip.  Some tenderness with palpation to the groin also.  Minimal to no pain with logroll.  No tenderness to the knee.  Band-Aids in place overlying superficial wounds to the knee.  TA/EHL/FHL/GS motor intact.  DP/SP/S SI OT.  Foot well-perfused.  RLE: Band-Aids in place overlying superficial wounds to the knee.  There is minimal really to no tenderness at all with palpation of the right hip.  There is some tenderness present to the posterior aspect near the SI joint at the

## 2024-11-03 NOTE — PROGRESS NOTES
Bladder scan done and shows 722mls in patient's bladder. Straight cath done as ordered. Urine qdhcyh=142zs

## 2024-11-04 LAB
ANION GAP SERPL CALC-SCNC: 13 MEQ/L (ref 8–16)
BASOPHILS ABSOLUTE: 0 THOU/MM3 (ref 0–0.1)
BASOPHILS NFR BLD AUTO: 0.2 %
BUN SERPL-MCNC: 4 MG/DL (ref 7–22)
CALCIUM SERPL-MCNC: 8.6 MG/DL (ref 8.5–10.5)
CHLORIDE SERPL-SCNC: 100 MEQ/L (ref 98–111)
CO2 SERPL-SCNC: 24 MEQ/L (ref 23–33)
CREAT SERPL-MCNC: 0.6 MG/DL (ref 0.4–1.2)
DEPRECATED RDW RBC AUTO: 51.2 FL (ref 35–45)
EOSINOPHIL NFR BLD AUTO: 1.7 %
EOSINOPHILS ABSOLUTE: 0.1 THOU/MM3 (ref 0–0.4)
ERYTHROCYTE [DISTWIDTH] IN BLOOD BY AUTOMATED COUNT: 14.8 % (ref 11.5–14.5)
GFR SERPL CREATININE-BSD FRML MDRD: > 90 ML/MIN/1.73M2
GLUCOSE SERPL-MCNC: 103 MG/DL (ref 70–108)
HCT VFR BLD AUTO: 33.3 % (ref 37–47)
HGB BLD-MCNC: 10.6 GM/DL (ref 12–16)
IMM GRANULOCYTES # BLD AUTO: 0.03 THOU/MM3 (ref 0–0.07)
IMM GRANULOCYTES NFR BLD AUTO: 0.4 %
LYMPHOCYTES ABSOLUTE: 1.7 THOU/MM3 (ref 1–4.8)
LYMPHOCYTES NFR BLD AUTO: 20.8 %
MCH RBC QN AUTO: 29.9 PG (ref 26–33)
MCHC RBC AUTO-ENTMCNC: 31.8 GM/DL (ref 32.2–35.5)
MCV RBC AUTO: 94.1 FL (ref 81–99)
MONOCYTES ABSOLUTE: 1 THOU/MM3 (ref 0.4–1.3)
MONOCYTES NFR BLD AUTO: 12.9 %
NEUTROPHILS ABSOLUTE: 5.2 THOU/MM3 (ref 1.8–7.7)
NEUTROPHILS NFR BLD AUTO: 64 %
NRBC BLD AUTO-RTO: 0 /100 WBC
PLATELET # BLD AUTO: 225 THOU/MM3 (ref 130–400)
PMV BLD AUTO: 11.4 FL (ref 9.4–12.4)
POTASSIUM SERPL-SCNC: 3.7 MEQ/L (ref 3.5–5.2)
RBC # BLD AUTO: 3.54 MILL/MM3 (ref 4.2–5.4)
SODIUM SERPL-SCNC: 137 MEQ/L (ref 135–145)
WBC # BLD AUTO: 8.1 THOU/MM3 (ref 4.8–10.8)

## 2024-11-04 PROCEDURE — 97116 GAIT TRAINING THERAPY: CPT

## 2024-11-04 PROCEDURE — 6360000002 HC RX W HCPCS: Performed by: PHYSICIAN ASSISTANT

## 2024-11-04 PROCEDURE — 97535 SELF CARE MNGMENT TRAINING: CPT

## 2024-11-04 PROCEDURE — 85025 COMPLETE CBC W/AUTO DIFF WBC: CPT

## 2024-11-04 PROCEDURE — 2580000003 HC RX 258: Performed by: PHYSICIAN ASSISTANT

## 2024-11-04 PROCEDURE — 36415 COLL VENOUS BLD VENIPUNCTURE: CPT

## 2024-11-04 PROCEDURE — 80048 BASIC METABOLIC PNL TOTAL CA: CPT

## 2024-11-04 PROCEDURE — 6370000000 HC RX 637 (ALT 250 FOR IP): Performed by: PHYSICIAN ASSISTANT

## 2024-11-04 PROCEDURE — 99222 1ST HOSP IP/OBS MODERATE 55: CPT | Performed by: PHYSICAL MEDICINE & REHABILITATION

## 2024-11-04 PROCEDURE — 97530 THERAPEUTIC ACTIVITIES: CPT

## 2024-11-04 PROCEDURE — 1200000000 HC SEMI PRIVATE

## 2024-11-04 PROCEDURE — 6370000000 HC RX 637 (ALT 250 FOR IP): Performed by: PHYSICAL MEDICINE & REHABILITATION

## 2024-11-04 RX ORDER — KETOROLAC TROMETHAMINE 30 MG/ML
30 INJECTION, SOLUTION INTRAMUSCULAR; INTRAVENOUS EVERY 6 HOURS
Status: DISCONTINUED | OUTPATIENT
Start: 2024-11-04 | End: 2024-11-05 | Stop reason: HOSPADM

## 2024-11-04 RX ORDER — CYCLOBENZAPRINE HCL 10 MG
10 TABLET ORAL 3 TIMES DAILY PRN
Status: DISCONTINUED | OUTPATIENT
Start: 2024-11-04 | End: 2024-11-05 | Stop reason: HOSPADM

## 2024-11-04 RX ORDER — OXYCODONE HYDROCHLORIDE 5 MG/1
5 TABLET ORAL EVERY 4 HOURS PRN
Status: DISCONTINUED | OUTPATIENT
Start: 2024-11-04 | End: 2024-11-05 | Stop reason: HOSPADM

## 2024-11-04 RX ORDER — BISACODYL 10 MG
10 SUPPOSITORY, RECTAL RECTAL DAILY PRN
Status: DISCONTINUED | OUTPATIENT
Start: 2024-11-04 | End: 2024-11-05 | Stop reason: HOSPADM

## 2024-11-04 RX ORDER — DOCUSATE SODIUM 100 MG/1
100 CAPSULE, LIQUID FILLED ORAL 2 TIMES DAILY
Status: DISCONTINUED | OUTPATIENT
Start: 2024-11-04 | End: 2024-11-05 | Stop reason: HOSPADM

## 2024-11-04 RX ORDER — DOCUSATE SODIUM 100 MG/1
100 CAPSULE, LIQUID FILLED ORAL DAILY
Status: DISCONTINUED | OUTPATIENT
Start: 2024-11-04 | End: 2024-11-04

## 2024-11-04 RX ORDER — SENNOSIDES A AND B 8.6 MG/1
1 TABLET, FILM COATED ORAL NIGHTLY
Status: DISCONTINUED | OUTPATIENT
Start: 2024-11-04 | End: 2024-11-05 | Stop reason: HOSPADM

## 2024-11-04 RX ORDER — PANTOPRAZOLE SODIUM 40 MG/1
40 TABLET, DELAYED RELEASE ORAL
Status: DISCONTINUED | OUTPATIENT
Start: 2024-11-04 | End: 2024-11-05 | Stop reason: HOSPADM

## 2024-11-04 RX ORDER — OXYCODONE HYDROCHLORIDE 5 MG/1
10 TABLET ORAL EVERY 4 HOURS PRN
Status: DISCONTINUED | OUTPATIENT
Start: 2024-11-04 | End: 2024-11-05 | Stop reason: HOSPADM

## 2024-11-04 RX ADMIN — ACETAMINOPHEN 650 MG: 325 TABLET ORAL at 16:08

## 2024-11-04 RX ADMIN — DOCUSATE SODIUM 100 MG: 100 CAPSULE, LIQUID FILLED ORAL at 20:16

## 2024-11-04 RX ADMIN — KETOROLAC TROMETHAMINE 30 MG: 30 INJECTION, SOLUTION INTRAMUSCULAR at 20:16

## 2024-11-04 RX ADMIN — BACITRACIN ZINC NEOMYCIN SULFATE POLYMYXIN B SULFATE: 400; 3.5; 5 OINTMENT TOPICAL at 09:10

## 2024-11-04 RX ADMIN — SODIUM CHLORIDE, PRESERVATIVE FREE 10 ML: 5 INJECTION INTRAVENOUS at 09:11

## 2024-11-04 RX ADMIN — POLYETHYLENE GLYCOL 3350 17 G: 17 POWDER, FOR SOLUTION ORAL at 16:08

## 2024-11-04 RX ADMIN — OXYCODONE 10 MG: 5 TABLET ORAL at 12:21

## 2024-11-04 RX ADMIN — SODIUM CHLORIDE, PRESERVATIVE FREE 10 ML: 5 INJECTION INTRAVENOUS at 20:24

## 2024-11-04 RX ADMIN — KETOROLAC TROMETHAMINE 30 MG: 30 INJECTION, SOLUTION INTRAMUSCULAR at 09:11

## 2024-11-04 RX ADMIN — OXYCODONE 5 MG: 5 TABLET ORAL at 16:08

## 2024-11-04 RX ADMIN — DOCUSATE SODIUM 100 MG: 100 CAPSULE, LIQUID FILLED ORAL at 09:10

## 2024-11-04 RX ADMIN — ACETAMINOPHEN 650 MG: 325 TABLET ORAL at 09:10

## 2024-11-04 RX ADMIN — PANTOPRAZOLE SODIUM 40 MG: 40 TABLET, DELAYED RELEASE ORAL at 09:10

## 2024-11-04 RX ADMIN — SENNOSIDES 8.6 MG: 8.6 TABLET, FILM COATED ORAL at 20:16

## 2024-11-04 RX ADMIN — BACITRACIN ZINC NEOMYCIN SULFATE POLYMYXIN B SULFATE: 400; 3.5; 5 OINTMENT TOPICAL at 20:17

## 2024-11-04 RX ADMIN — HYDROCODONE BITARTRATE AND ACETAMINOPHEN 1 TABLET: 5; 325 TABLET ORAL at 03:04

## 2024-11-04 RX ADMIN — ACETAMINOPHEN 650 MG: 325 TABLET ORAL at 22:56

## 2024-11-04 RX ADMIN — ENOXAPARIN SODIUM 40 MG: 100 INJECTION SUBCUTANEOUS at 09:10

## 2024-11-04 ASSESSMENT — PAIN DESCRIPTION - ORIENTATION
ORIENTATION: LEFT
ORIENTATION: RIGHT;LEFT
ORIENTATION: LEFT
ORIENTATION: RIGHT;LEFT
ORIENTATION: RIGHT;LEFT
ORIENTATION: LEFT

## 2024-11-04 ASSESSMENT — PAIN DESCRIPTION - LOCATION
LOCATION: ABDOMEN
LOCATION: HIP

## 2024-11-04 ASSESSMENT — PAIN SCALES - WONG BAKER
WONGBAKER_NUMERICALRESPONSE: HURTS A LITTLE BIT
WONGBAKER_NUMERICALRESPONSE: HURTS A LITTLE BIT

## 2024-11-04 ASSESSMENT — PAIN SCALES - GENERAL
PAINLEVEL_OUTOF10: 6
PAINLEVEL_OUTOF10: 7
PAINLEVEL_OUTOF10: 7
PAINLEVEL_OUTOF10: 6
PAINLEVEL_OUTOF10: 8
PAINLEVEL_OUTOF10: 6

## 2024-11-04 ASSESSMENT — PAIN DESCRIPTION - PAIN TYPE
TYPE: ACUTE PAIN

## 2024-11-04 ASSESSMENT — ENCOUNTER SYMPTOMS
ABDOMINAL DISTENTION: 0
TROUBLE SWALLOWING: 0
ABDOMINAL PAIN: 0
NAUSEA: 0
EYE PAIN: 0
WHEEZING: 0
DIARRHEA: 0
CONSTIPATION: 1
EYE DISCHARGE: 0
VOMITING: 0
RHINORRHEA: 0
SORE THROAT: 0
SHORTNESS OF BREATH: 0
BACK PAIN: 1
COUGH: 0

## 2024-11-04 ASSESSMENT — PAIN DESCRIPTION - DESCRIPTORS
DESCRIPTORS: ACHING
DESCRIPTORS: ACHING
DESCRIPTORS: ACHING;OTHER (COMMENT)
DESCRIPTORS: ACHING

## 2024-11-04 ASSESSMENT — PAIN - FUNCTIONAL ASSESSMENT
PAIN_FUNCTIONAL_ASSESSMENT: ACTIVITIES ARE NOT PREVENTED
PAIN_FUNCTIONAL_ASSESSMENT: PREVENTS OR INTERFERES SOME ACTIVE ACTIVITIES AND ADLS
PAIN_FUNCTIONAL_ASSESSMENT: ACTIVITIES ARE NOT PREVENTED

## 2024-11-04 NOTE — PLAN OF CARE
Problem: Discharge Planning  Goal: Discharge to home or other facility with appropriate resources  11/4/2024 1743 by Liana Grace RN  Outcome: Progressing  Flowsheets (Taken 11/4/2024 1743)  Discharge to home or other facility with appropriate resources: Identify barriers to discharge with patient and caregiver     Problem: Pain  Goal: Verbalizes/displays adequate comfort level or baseline comfort level  11/4/2024 1743 by Liana Grace RN  Outcome: Progressing  Flowsheets (Taken 11/4/2024 1743)  Verbalizes/displays adequate comfort level or baseline comfort level: Encourage patient to monitor pain and request assistance     Problem: Skin/Tissue Integrity  Goal: Absence of new skin breakdown  Description: 1.  Monitor for areas of redness and/or skin breakdown  2.  Assess vascular access sites hourly  3.  Every 4-6 hours minimum:  Change oxygen saturation probe site  4.  Every 4-6 hours:  If on nasal continuous positive airway pressure, respiratory therapy assess nares and determine need for appliance change or resting period.  11/4/2024 1743 by Liana Grace RN  Outcome: Progressing  Note: No evidence of new skin breakdown assessed this shift.      Problem: Safety - Adult  Goal: Free from fall injury  11/4/2024 1743 by Liana Grace RN  Outcome: Progressing  Flowsheets (Taken 11/4/2024 1743)  Free From Fall Injury: Instruct family/caregiver on patient safety     Problem: Musculoskeletal - Adult  Goal: Return mobility to safest level of function  11/4/2024 1743 by Liana Grace RN  Outcome: Progressing  Flowsheets (Taken 11/4/2024 1743)  Return Mobility to Safest Level of Function: Assess patient stability and activity tolerance for standing, transferring and ambulating with or without assistive devices     Problem: Musculoskeletal - Adult  Goal: Return ADL status to a safe level of function  11/4/2024 1743 by Liana Grace RN  Outcome: Progressing  Flowsheets (Taken 11/4/2024 1743)  Return ADL Status to a  Safe Level of Function: Administer medication as ordered     Problem: ABCDS Injury Assessment  Goal: Absence of physical injury  11/4/2024 1743 by Liana Grace, RN  Outcome: Progressing  Flowsheets (Taken 11/4/2024 1743)  Absence of Physical Injury: Implement safety measures based on patient assessment   Care plan reviewed with patient.  Patient verbalize understanding of the plan of care and contribute to goal setting.

## 2024-11-04 NOTE — PLAN OF CARE
Problem: Discharge Planning  Goal: Discharge to home or other facility with appropriate resources  Outcome: Progressing  Flowsheets (Taken 11/4/2024 0416)  Discharge to home or other facility with appropriate resources:   Identify barriers to discharge with patient and caregiver   Arrange for needed discharge resources and transportation as appropriate   Identify discharge learning needs (meds, wound care, etc)     Problem: Pain  Goal: Verbalizes/displays adequate comfort level or baseline comfort level  Outcome: Progressing  Flowsheets (Taken 11/4/2024 0416)  Verbalizes/displays adequate comfort level or baseline comfort level:   Encourage patient to monitor pain and request assistance   Assess pain using appropriate pain scale   Administer analgesics based on type and severity of pain and evaluate response   Implement non-pharmacological measures as appropriate and evaluate response     Problem: Skin/Tissue Integrity  Goal: Absence of new skin breakdown  Description: 1.  Monitor for areas of redness and/or skin breakdown  2.  Assess vascular access sites hourly  3.  Every 4-6 hours minimum:  Change oxygen saturation probe site  4.  Every 4-6 hours:  If on nasal continuous positive airway pressure, respiratory therapy assess nares and determine need for appliance change or resting period.  Outcome: Progressing     Problem: Safety - Adult  Goal: Free from fall injury  Outcome: Progressing  Flowsheets (Taken 11/4/2024 0416)  Free From Fall Injury: Instruct family/caregiver on patient safety     Problem: Musculoskeletal - Adult  Goal: Return mobility to safest level of function  Outcome: Progressing  Flowsheets (Taken 11/4/2024 0416)  Return Mobility to Safest Level of Function:   Assess patient stability and activity tolerance for standing, transferring and ambulating with or without assistive devices   Assist with transfers and ambulation using safe patient handling equipment as needed   Ensure adequate protection

## 2024-11-04 NOTE — CARE COORDINATION
Case Management Assessment Initial Evaluation    Date/Time of Evaluation: 2024 8:41 AM  Assessment Completed by: Magda Cali RN    If patient is discharged prior to next notation, then this note serves as note for discharge by case management.    Patient Name: Linda Don                   YOB: 1990  Diagnosis: Abrasions of multiple sites [T07.XXXA]  Multiple contusions [T07.XXXA]  Fall down stairs, initial encounter [W10.8XXA]  Closed fracture of single pubic ramus of pelvis, left, initial encounter ([facility]) [S32.592A]  Closed fracture of right iliac wing, initial encounter ([facility]) [S32.301A]  Closed fracture of multiple pubic rami, left, sequela [S32.592S]  Multiple closed fractures of pelvis with stable disruption of pelvic ring, initial encounter ([facility]) [S32.810A]  Traumatic injury of pelvis [S39.93XA]                   Date / Time: 2024  8:29 AM  Location: [address] [place] [address]     Patient Admission Status: Inpatient   Readmission Risk Low 0-14, Mod 15-19), High > 20: Readmission Risk Score: 9.3    Current PCP: No primary care provider on file.  Health Care Decision Makers:     Additional Case Management Notes: Patient presented to ED, transferred from [facility] after an unwitnessed fall down 12 stairs, hitting head. No LOC. Found to have multiple pelvic fractures. Admitted by ortho surgery. WBAT to LLE. NWB to RLE. PT/OT. Non-surgical at this time. IV Toradol scheduled q6. PRN IV morphine w goals to wean to PO.     Procedures: n/a    Imagin/2 CT A/P:   Uterine fibroid. Bilateral ovarian cyst. Findings of constipation and colonic ileus.  2. Nondisplaced fracture of the right iliac bone superiorly, posteriorly, medially. Nondisplaced comminuted fracture left superior pubic ramus. Nondisplaced acute fracture left inferior pubic ramus.  3. Findings of soft tissue contusion left flank posteriorly.      Patient Goals/Plan/Treatment Preferences: Linda is from home with her S/O and minor

## 2024-11-04 NOTE — PROGRESS NOTES
[facility]  INPATIENT REHABILITATION  ADMISSIONS COORDINATOR CONSULT    Referral Type: internal    Patient Name: Linda Don      MRN: [id number]    : 1990  (34 y.o.)  Gender: female   Race:Black /      Payor Source: Payor: PENDING MEDICAID / Plan: PENDING MEDICAID / Product Type: *No Product type* /   Secondary Payor Source:      Isolation Status: No active isolations    Lives With: Other (comment) (boyfriend)  Type of Home: Condo  Home Layout: One level  Home Access: Stairs to enter with rails  Entrance Stairs - Number of Steps: 2 flights of steps        Additional Comments: Pt's boyfriend works during the day--his shop is under condo    What is treatment plan?  Disciplines Required upon Admission to Inpatient Rehabilitation: Physical Therapy and Occupational Therapy  Post operative: No  Fall: Yes  Dialysis: No  Diet: ADULT DIET; Regular  Discussed this patient with ANTONIO Man.  Patient had application completed 10/4 for Medicaid.  Neelam, Public Benefits called with Confirmation number [id number] Pending Medicaid.

## 2024-11-04 NOTE — PROGRESS NOTES
Orthopaedic Progress Note      SUBJECTIVE   Ms. Don is hospital day 3, fall, pelvic fractures  R iliac wing, L pubic rami   Pain with mobilization, good effort  no adverse events overnight  Pain control tenuous  Advanced diet  No nausea vomiting chest pain sob headache dizziness      OBJECTIVE      Physical    VITALS:  /73   Pulse 83   Temp 98.4 °F (36.9 °C) (Oral)   Resp 16   Ht 1.575 m (5' 2\")   Wt 54 kg (119 lb)   LMP 10/17/2024 (Approximate)   SpO2 97%   BMI 21.77 kg/m²   I/O last 3 completed shifts:  In: 600 [P.O.:600]  Out: 2550 [Urine:2550]    7/10 pain  Gen: alert and oriented  Head: normorcephalic, atraumatic  Resp: unlabored, room air  Pelvis: stable  RLE:- atraumatic hip, knee, ankle, abrasions to the knee, no effusion of deep wounds, no lacerations otherwise. Sitting in neutral alignment. Compartments soft.  Nontender to palpation asis, TTP iliac crest to PSIS, nontender greater troch or groin, nontender femoral shaft, nontender medial lateral joint line, tibia shaft, medial lateral mal, midfoot, calc, calf supple nontender,  Able to perform SLR, gastroc ta ehl intact, painless IR ER through hip. sensation to light touch intact, distal pulses palpable    LLE:- atraumatic hip, knee, ankle, superficial abrasions to the knee,no associated effusion at the joint, no lacerations or lesions otherwise. Sitting in neutral alignment. Compartments soft.  Nontender to palpation asis iliac crest greater troch, TTP groin, nontender femoral shaft, nontender medial lateral joint line, tibia shaft, medial lateral mal, midfoot, calc, calf supple nontender,  Able to perform SLR, some anterior thigh pain with this, gastroc ta ehl intact, painless IR ER through hip. sensation to light touch intact, distal pulses palpable    Data  CBC:   Lab Results   Component Value Date/Time    WBC 8.1 11/04/2024 05:27 AM    HGB 10.6 11/04/2024 05:27 AM     11/04/2024 05:27 AM     BMP:    Lab Results   Component Value

## 2024-11-04 NOTE — PROGRESS NOTES
[facility]  INPATIENT PHYSICAL THERAPY  DAILY NOTE  [facility] ORTHOPEDICS 7K - 7K-06/006-A      Discharge Recommendations: Continue to assess pending progress, Patient would benefit from continued PT at discharge, and pt would benefit from an inpt therapy stay and would tolerate 3 hours.  Equipment Recommendations:  discussed checking into [employer] for possible w/c, RW, showerchair  Walker: Rolling      Time In: 1121  Time Out: 1153  Timed Code Treatment Minutes: 32 Minutes  Minutes: 32          Date: 2024  Patient Name: Linda Don,  Gender:  female        MRN: [id number]  : 1990  (34 y.o.)  Referral Date : 24  Referring Practitioner: Carlos Schaffer PA-C  Diagnosis: Multiple closed fractures of pelvis with stable disruption of pelvic ring, initial encounter ([facility])  Additional Pertinent Hx: Per EMR:The patient is a 34 y.o. female with who presented to [facility] emergency department earlier this morning after a transfer from [facility] secondary to pelvic fractures noted.  She had a fall early this morning as she returned home from a night out.  She fell down a flight of stairs that was approximately 12 steps.  Describes pain more so to this left hip.Imaging revealing pt with right iliac wing and left superior and inferior pubic rami fractures     Prior Level of Function:  Lives With: Other (comment) (boyfriend)  Type of Home: Condo  Home Layout: One level  Home Access: Stairs to enter with rails  Entrance Stairs - Number of Steps: 2 flights of steps  Entrance Stairs - Rails: Left   Bathroom Shower/Tub: Walk-in shower  Bathroom Toilet: Standard    ADL Assistance: Independent  Homemaking Assistance: Independent  Ambulation Assistance: Independent  Transfer Assistance: Independent  Active : Yes  Additional Comments: Pt's boyfriend works during the day--his shop is under condo    Restrictions/Precautions:  Restrictions/Precautions: Fall Risk, Weight  of  treatment:Good.  Plan: Current Treatment Recommendations: Strengthening, Balance training, Functional mobility training, Transfer training, Neuromuscular re-education, Endurance training, Home exercise program, Equipment evaluation, education, & procurement, Safety education & training, Patient/Caregiver education & training, Therapeutic activities, Pain management, Gait training, Stair training  General Plan:  (5-6xO)    Education:  Learners: Patient  Patient Education: Bed Mobility, Equipment Education, Transfers, Gait, Stairs, Use of Gait Belt, Car Transfers, use of waffle cushion    Goals:  Patient Goals : go home, decrease pain  Short Term Goals  Time Frame for Short Term Goals: at discharge  Short Term Goal 1: Patient will complete supine < > sit with SBA, head of bed flat, no rails to transfer in and out of bed with decreased difficulty.  Short Term Goal 2: Patient will complete sit < > stand, WBAT BLE, CGA to stand to transfer to chair.  Short Term Goal 3: amb >50'x1 with walker, WBAT BLE, and SBA to walk safely in home  Short Term Goal 4: negotiate flight of steps, WBAT BLE, and CGA to get to second floor apt safely  Short Term Goal 5: pt verbalize understanding for car transfer for safe transportation home  Long Term Goals  Time Frame for Long Term Goals : N/A due to short estimated length of stay    Following session, patient left in safe position with all fall risk precautions in place.

## 2024-11-04 NOTE — PROGRESS NOTES
[place]  [facility] ORTHOPEDICS 7K  Occupational Therapy  Daily Note    Discharge Recommendations: Inpatient Therapy Stay continue to assess  Equipment Recommendations:   Continue to assess pending progress and discharge location.  Pt has potential need for wheelchair, BSC and tub transfer bench.       Time In: 814  Time Out: 855  Timed Code Treatment Minutes: 41 Minutes  Minutes: 41          Date: 2024  Patient Name: Linda Don,   Gender: female      [facility]  MRN: [id number]  : 1990  (34 y.o.)  Referring Practitioner: Carlos Schaffer PA-C  Diagnosis: Multiple closed fractures of pelvis with stable disruption of pelvic ring, initial encounter ([facility])  Additional Pertinent Hx: Per EMR:The patient is a 34 y.o. female with who presented to [facility] emergency department earlier this morning after a transfer from [facility] secondary to pelvic fractures noted.  She had a fall early this morning as she returned home from a night out.  She fell down a flight of stairs that was approximately 12 steps.  Describes pain more so to this left hip.Imaging revealing pt with right iliac wing and left superior and inferior pubic rami fractures    Restrictions/Precautions:  Restrictions/Precautions: Fall Risk, Weight Bearing  Right Lower Extremity Weight Bearing: Weight Bearing As Tolerated  Left Lower Extremity Weight Bearing: Weight Bearing As Tolerated  Position Activity Restriction  Other position/activity restrictions: Per Orth orders via Shirin, WBAT BLE       Social/Functional History:  Lives With: Other (comment) (boyfriend)  Type of Home: Condo  Home Layout: One level  Home Access: Stairs to enter with rails  Entrance Stairs - Number of Steps: 2 flights of steps  Entrance Stairs - Rails: Left   Bathroom Shower/Tub: Walk-in shower  Bathroom Toilet: Standard       ADL Assistance: Independent  Homemaking Assistance: Independent  Ambulation Assistance:  Independent  Transfer Assistance: Independent    Active : Yes     Additional Comments: Pt's boyfriend works during the day--his shop is under condo    SUBJECTIVE: Patient laying in bed upon arrival; agreeable to therapy this date.  Patient pleasant and cooperative throughout session.  Patient expresses concerns with difficulty with completing stairs in order to get in to house with patient's limited mobility thus far.  Patient notes significant other and brother discussed \"carrying\" patient up stairs if need be however patient unsure at this time d/t pain with mobility.  Patient also notes Shirin from ortho was in this am, changed WB restrictions to WBAT BLE with RW, order placed, updated restriction.    PAIN: 8/10: ice applied    Vitals: Vitals not assessed per clinical judgement, see nursing flowsheet    COGNITION: WFL    ADL:   Footwear Management: Stand By Assistance, X 1, with verbal cues , and with increased time for completion.  Education provided with LHAE in order to doff/erickson B socks with demonstration, demonstrating understanding with verbal cues, education provided how to erickson/doff pants, verbalizing understanding.  Provided patient with various options to obtain LHAE, verbalizing understanding .    BALANCE:  Sitting Balance:  Supervision.    Standing Balance: Contact Guard Assistance. RW, no LOB    BED MOBILITY:  Supine to Sit: Moderate Assistance, X 1, with head of bed raised, with rail, with verbal cues , with increased time for completion      TRANSFERS:  Sit to Stand:  Minimal Assistance, X 1, with increased time for completion, cues for hand placement. EOB to RW  Stand to Sit: Contact Guard Assistance, X 1, with increased time for completion, cues for hand placement, with verbal cues, to/from chair with arms.      FUNCTIONAL MOBILITY:  Assistive Device: Rolling Walker  Assist Level:  Contact Guard Assistance, X 1, with verbal cues , and with increased time for completion.   Distance:  EOB to

## 2024-11-04 NOTE — CONSULTS
Physical Medicine & Rehabilitation Consultation Note      Admitting Physician: Christoph Ley DO    Primary Care Provider: No primary care provider on file.     Reason for Consult:   Inpatient rehab evaluation    History of Present Illness:  Linda Don is a 34 y.o. right-handed -American female with no significant past medical history, was admitted to [facility] on 11/2/2024 for injuries sustained in a fall accident on the stairs at home.      The patient says she stayed over the night in a casino and returned home on early 11/2/2024 morning.  While she was walking up the stairs to reach her condo apartment on the second floor, her foot slipped causing her to lose balance because of slippery wooden floor with presence of her sock.  She then fell down to the bottom of the stair (approximately 12 steps).  She says her forehead hit something and she blacked out briefly.  Initially she feels severe pain at her lower back, right knee and right ankle.  When she tried to get up using her left lower extremity, she felt severe pain at her left groin.  Her crying and screaming woke her boyfriend up.  After she calmed down, she asked her boyfriend to bring her to emergency room.  Therefore her boyfriend brought her to [facility] ER for evaluation.  CT of abdomen and pelvis, and lumbar spine were performed at [facility] ER.  She was found to have significant pelvis fracture.  The patient then was transferred to [facility] for further care.  Images of abdomen/pelvis and the lumbar spine CT from [facility] were reviewed by radiologist.  She was found to have nondisplaced right iliac bone fracture superiorly, posteriorly and medially, nondisplaced left superior pubic rami's comminuted fracture, and nondisplaced acute left inferior pubic ramus fracture.  Because of multiple areas of skin abrasion wounds, additional x-ray of chest, left femur, right knee,

## 2024-11-05 ENCOUNTER — HOSPITAL ENCOUNTER (INPATIENT)
Age: 34
LOS: 11 days | Discharge: HOME | DRG: 341 | End: 2024-11-16
Attending: PHYSICAL MEDICINE & REHABILITATION | Admitting: PHYSICAL MEDICINE & REHABILITATION
Payer: MEDICAID

## 2024-11-05 VITALS
RESPIRATION RATE: 12 BRPM | OXYGEN SATURATION: 100 % | WEIGHT: 119 LBS | SYSTOLIC BLOOD PRESSURE: 110 MMHG | HEIGHT: 62 IN | DIASTOLIC BLOOD PRESSURE: 76 MMHG | BODY MASS INDEX: 21.9 KG/M2 | TEMPERATURE: 98.1 F | HEART RATE: 97 BPM

## 2024-11-05 DIAGNOSIS — S32.810A MULTIPLE FRACTURES OF PELVIS WITH STABLE DISRUPTION OF PELVIC RING, INITIAL ENCOUNTER FOR CLOSED FRACTURE (HCC): ICD-10-CM

## 2024-11-05 DIAGNOSIS — S32.810A MULTIPLE CLOSED FRACTURES OF PELVIS WITH STABLE DISRUPTION OF PELVIC RING, INITIAL ENCOUNTER (HCC): Primary | ICD-10-CM

## 2024-11-05 DIAGNOSIS — S32.592S CLOSED FRACTURE OF MULTIPLE PUBIC RAMI, LEFT, SEQUELA: ICD-10-CM

## 2024-11-05 PROBLEM — S90.511A ABRASION OF RIGHT ANKLE: Status: ACTIVE | Noted: 2024-11-05

## 2024-11-05 PROBLEM — S93.491A SPRAIN OF ANTERIOR TALOFIBULAR LIGAMENT OF RIGHT ANKLE: Status: ACTIVE | Noted: 2024-11-05

## 2024-11-05 PROBLEM — D64.9 ANEMIA: Status: ACTIVE | Noted: 2024-11-05

## 2024-11-05 PROBLEM — S00.83XA FOREHEAD CONTUSION: Status: ACTIVE | Noted: 2024-11-05

## 2024-11-05 PROBLEM — S06.9X9A TRAUMATIC BRAIN INJURY WITH BRIEF (LESS THAN 1 HOUR) LOSS OF CONSCIOUSNESS: Status: ACTIVE | Noted: 2024-11-05

## 2024-11-05 LAB
ANION GAP SERPL CALC-SCNC: 10 MEQ/L (ref 8–16)
BASOPHILS ABSOLUTE: 0 THOU/MM3 (ref 0–0.1)
BASOPHILS NFR BLD AUTO: 0.3 %
BUN SERPL-MCNC: 4 MG/DL (ref 7–22)
CALCIUM SERPL-MCNC: 8.4 MG/DL (ref 8.5–10.5)
CHLORIDE SERPL-SCNC: 100 MEQ/L (ref 98–111)
CO2 SERPL-SCNC: 24 MEQ/L (ref 23–33)
CREAT SERPL-MCNC: 0.5 MG/DL (ref 0.4–1.2)
DEPRECATED RDW RBC AUTO: 50.5 FL (ref 35–45)
EOSINOPHIL NFR BLD AUTO: 3.3 %
EOSINOPHILS ABSOLUTE: 0.3 THOU/MM3 (ref 0–0.4)
ERYTHROCYTE [DISTWIDTH] IN BLOOD BY AUTOMATED COUNT: 14.9 % (ref 11.5–14.5)
GFR SERPL CREATININE-BSD FRML MDRD: > 90 ML/MIN/1.73M2
GLUCOSE SERPL-MCNC: 90 MG/DL (ref 70–108)
HCT VFR BLD AUTO: 30.2 % (ref 37–47)
HGB BLD-MCNC: 9.5 GM/DL (ref 12–16)
IMM GRANULOCYTES # BLD AUTO: 0.04 THOU/MM3 (ref 0–0.07)
IMM GRANULOCYTES NFR BLD AUTO: 0.5 %
LYMPHOCYTES ABSOLUTE: 1.3 THOU/MM3 (ref 1–4.8)
LYMPHOCYTES NFR BLD AUTO: 17.4 %
MCH RBC QN AUTO: 29 PG (ref 26–33)
MCHC RBC AUTO-ENTMCNC: 31.5 GM/DL (ref 32.2–35.5)
MCV RBC AUTO: 92.1 FL (ref 81–99)
MONOCYTES ABSOLUTE: 1 THOU/MM3 (ref 0.4–1.3)
MONOCYTES NFR BLD AUTO: 12.9 %
NEUTROPHILS ABSOLUTE: 5.1 THOU/MM3 (ref 1.8–7.7)
NEUTROPHILS NFR BLD AUTO: 65.6 %
NRBC BLD AUTO-RTO: 0 /100 WBC
PLATELET # BLD AUTO: 224 THOU/MM3 (ref 130–400)
PMV BLD AUTO: 12.1 FL (ref 9.4–12.4)
POTASSIUM SERPL-SCNC: 3.8 MEQ/L (ref 3.5–5.2)
RBC # BLD AUTO: 3.28 MILL/MM3 (ref 4.2–5.4)
SODIUM SERPL-SCNC: 134 MEQ/L (ref 135–145)
WBC # BLD AUTO: 7.7 THOU/MM3 (ref 4.8–10.8)

## 2024-11-05 PROCEDURE — 97535 SELF CARE MNGMENT TRAINING: CPT

## 2024-11-05 PROCEDURE — 1180000000 HC REHAB R&B

## 2024-11-05 PROCEDURE — 6360000002 HC RX W HCPCS: Performed by: PHYSICIAN ASSISTANT

## 2024-11-05 PROCEDURE — 97116 GAIT TRAINING THERAPY: CPT

## 2024-11-05 PROCEDURE — 6370000000 HC RX 637 (ALT 250 FOR IP): Performed by: PHYSICIAN ASSISTANT

## 2024-11-05 PROCEDURE — 80048 BASIC METABOLIC PNL TOTAL CA: CPT

## 2024-11-05 PROCEDURE — 6370000000 HC RX 637 (ALT 250 FOR IP): Performed by: PHYSICAL MEDICINE & REHABILITATION

## 2024-11-05 PROCEDURE — 97530 THERAPEUTIC ACTIVITIES: CPT

## 2024-11-05 PROCEDURE — 2580000003 HC RX 258: Performed by: PHYSICIAN ASSISTANT

## 2024-11-05 PROCEDURE — 85025 COMPLETE CBC W/AUTO DIFF WBC: CPT

## 2024-11-05 PROCEDURE — 36415 COLL VENOUS BLD VENIPUNCTURE: CPT

## 2024-11-05 PROCEDURE — 99222 1ST HOSP IP/OBS MODERATE 55: CPT | Performed by: PHYSICAL MEDICINE & REHABILITATION

## 2024-11-05 PROCEDURE — 2580000003 HC RX 258: Performed by: PHYSICAL MEDICINE & REHABILITATION

## 2024-11-05 PROCEDURE — 97110 THERAPEUTIC EXERCISES: CPT

## 2024-11-05 RX ORDER — ACETAMINOPHEN 325 MG/1
650 TABLET ORAL EVERY 4 HOURS PRN
Status: DISCONTINUED | OUTPATIENT
Start: 2024-11-05 | End: 2024-11-16 | Stop reason: HOSPADM

## 2024-11-05 RX ORDER — PANTOPRAZOLE SODIUM 40 MG/1
40 TABLET, DELAYED RELEASE ORAL
Status: DISCONTINUED | OUTPATIENT
Start: 2024-11-06 | End: 2024-11-16 | Stop reason: HOSPADM

## 2024-11-05 RX ORDER — SODIUM CHLORIDE 0.9 % (FLUSH) 0.9 %
5-40 SYRINGE (ML) INJECTION EVERY 12 HOURS SCHEDULED
Status: CANCELLED | OUTPATIENT
Start: 2024-11-05

## 2024-11-05 RX ORDER — DOCUSATE SODIUM 100 MG/1
100 CAPSULE, LIQUID FILLED ORAL 2 TIMES DAILY
Status: CANCELLED | OUTPATIENT
Start: 2024-11-05

## 2024-11-05 RX ORDER — SODIUM CHLORIDE 0.9 % (FLUSH) 0.9 %
5-40 SYRINGE (ML) INJECTION EVERY 12 HOURS SCHEDULED
Status: DISCONTINUED | OUTPATIENT
Start: 2024-11-05 | End: 2024-11-16 | Stop reason: HOSPADM

## 2024-11-05 RX ORDER — CYCLOBENZAPRINE HCL 10 MG
10 TABLET ORAL 3 TIMES DAILY PRN
Qty: 30 TABLET | Refills: 0 | Status: ON HOLD
Start: 2024-11-05 | End: 2024-11-15

## 2024-11-05 RX ORDER — BISACODYL 10 MG
10 SUPPOSITORY, RECTAL RECTAL DAILY PRN
Status: DISCONTINUED | OUTPATIENT
Start: 2024-11-05 | End: 2024-11-16 | Stop reason: HOSPADM

## 2024-11-05 RX ORDER — CYCLOBENZAPRINE HCL 10 MG
10 TABLET ORAL 3 TIMES DAILY PRN
Status: DISCONTINUED | OUTPATIENT
Start: 2024-11-05 | End: 2024-11-16 | Stop reason: HOSPADM

## 2024-11-05 RX ORDER — ACETAMINOPHEN 325 MG/1
650 TABLET ORAL EVERY 6 HOURS
Status: CANCELLED | OUTPATIENT
Start: 2024-11-05

## 2024-11-05 RX ORDER — OXYCODONE HYDROCHLORIDE 5 MG/1
5 TABLET ORAL EVERY 4 HOURS PRN
Status: DISCONTINUED | OUTPATIENT
Start: 2024-11-05 | End: 2024-11-13

## 2024-11-05 RX ORDER — OXYCODONE HYDROCHLORIDE 5 MG/1
10 TABLET ORAL EVERY 4 HOURS PRN
Status: DISCONTINUED | OUTPATIENT
Start: 2024-11-05 | End: 2024-11-13

## 2024-11-05 RX ORDER — ACETAMINOPHEN 325 MG/1
650 TABLET ORAL EVERY 4 HOURS PRN
Status: CANCELLED | OUTPATIENT
Start: 2024-11-05

## 2024-11-05 RX ORDER — PANTOPRAZOLE SODIUM 40 MG/1
40 TABLET, DELAYED RELEASE ORAL
Status: CANCELLED | OUTPATIENT
Start: 2024-11-06

## 2024-11-05 RX ORDER — POLYETHYLENE GLYCOL 3350 17 G/17G
17 POWDER, FOR SOLUTION ORAL DAILY PRN
Status: DISCONTINUED | OUTPATIENT
Start: 2024-11-05 | End: 2024-11-16 | Stop reason: HOSPADM

## 2024-11-05 RX ORDER — PSEUDOEPHEDRINE HCL 30 MG
100 TABLET ORAL 2 TIMES DAILY
Qty: 20 CAPSULE | Refills: 0 | Status: ON HOLD
Start: 2024-11-05 | End: 2024-11-15 | Stop reason: HOSPADM

## 2024-11-05 RX ORDER — SENNOSIDES A AND B 8.6 MG/1
1 TABLET, FILM COATED ORAL NIGHTLY
Status: DISCONTINUED | OUTPATIENT
Start: 2024-11-05 | End: 2024-11-16 | Stop reason: HOSPADM

## 2024-11-05 RX ORDER — ENOXAPARIN SODIUM 100 MG/ML
40 INJECTION SUBCUTANEOUS DAILY
Qty: 8.4 ML | Refills: 0 | Status: ON HOLD
Start: 2024-11-06 | End: 2024-11-15 | Stop reason: HOSPADM

## 2024-11-05 RX ORDER — ENOXAPARIN SODIUM 100 MG/ML
40 INJECTION SUBCUTANEOUS DAILY
Status: DISCONTINUED | OUTPATIENT
Start: 2024-11-06 | End: 2024-11-16 | Stop reason: HOSPADM

## 2024-11-05 RX ORDER — OXYCODONE HYDROCHLORIDE 5 MG/1
10 TABLET ORAL EVERY 4 HOURS PRN
Status: CANCELLED | OUTPATIENT
Start: 2024-11-05

## 2024-11-05 RX ORDER — OXYCODONE HYDROCHLORIDE 5 MG/1
5 TABLET ORAL EVERY 6 HOURS PRN
Qty: 28 TABLET | Refills: 0 | Status: ON HOLD
Start: 2024-11-05 | End: 2024-11-15 | Stop reason: HOSPADM

## 2024-11-05 RX ORDER — DOCUSATE SODIUM 100 MG/1
100 CAPSULE, LIQUID FILLED ORAL 2 TIMES DAILY
Status: DISCONTINUED | OUTPATIENT
Start: 2024-11-05 | End: 2024-11-16 | Stop reason: HOSPADM

## 2024-11-05 RX ORDER — TRAZODONE HYDROCHLORIDE 50 MG/1
50 TABLET, FILM COATED ORAL NIGHTLY PRN
Status: CANCELLED | OUTPATIENT
Start: 2024-11-05

## 2024-11-05 RX ORDER — BISACODYL 10 MG
10 SUPPOSITORY, RECTAL RECTAL DAILY PRN
Status: CANCELLED | OUTPATIENT
Start: 2024-11-05

## 2024-11-05 RX ORDER — OXYCODONE HYDROCHLORIDE 5 MG/1
5 TABLET ORAL EVERY 4 HOURS PRN
Status: CANCELLED | OUTPATIENT
Start: 2024-11-05

## 2024-11-05 RX ORDER — POLYETHYLENE GLYCOL 3350 17 G/17G
17 POWDER, FOR SOLUTION ORAL DAILY PRN
Status: CANCELLED | OUTPATIENT
Start: 2024-11-05

## 2024-11-05 RX ORDER — SODIUM CHLORIDE 9 MG/ML
INJECTION, SOLUTION INTRAVENOUS PRN
Status: CANCELLED | OUTPATIENT
Start: 2024-11-05

## 2024-11-05 RX ORDER — NEOMYCIN/BACITRACIN/POLYMYXINB 3.5-400-5K
OINTMENT (GRAM) TOPICAL 2 TIMES DAILY
Status: COMPLETED | OUTPATIENT
Start: 2024-11-05 | End: 2024-11-05

## 2024-11-05 RX ORDER — ONDANSETRON 4 MG/1
4 TABLET, ORALLY DISINTEGRATING ORAL EVERY 8 HOURS PRN
Status: CANCELLED | OUTPATIENT
Start: 2024-11-05

## 2024-11-05 RX ORDER — ENOXAPARIN SODIUM 100 MG/ML
40 INJECTION SUBCUTANEOUS DAILY
Status: CANCELLED | OUTPATIENT
Start: 2024-11-06

## 2024-11-05 RX ORDER — MULTIVITAMIN WITH IRON
1 TABLET ORAL DAILY
Status: DISCONTINUED | OUTPATIENT
Start: 2024-11-05 | End: 2024-11-16 | Stop reason: HOSPADM

## 2024-11-05 RX ORDER — ACETAMINOPHEN 325 MG/1
650 TABLET ORAL EVERY 6 HOURS
Status: DISCONTINUED | OUTPATIENT
Start: 2024-11-05 | End: 2024-11-16 | Stop reason: HOSPADM

## 2024-11-05 RX ORDER — NEOMYCIN/BACITRACIN/POLYMYXINB 3.5-400-5K
OINTMENT (GRAM) TOPICAL 2 TIMES DAILY
Status: CANCELLED | OUTPATIENT
Start: 2024-11-05 | End: 2024-11-06

## 2024-11-05 RX ORDER — SENNOSIDES A AND B 8.6 MG/1
1 TABLET, FILM COATED ORAL NIGHTLY
Status: CANCELLED | OUTPATIENT
Start: 2024-11-05

## 2024-11-05 RX ORDER — TRAZODONE HYDROCHLORIDE 50 MG/1
50 TABLET, FILM COATED ORAL NIGHTLY PRN
Status: DISCONTINUED | OUTPATIENT
Start: 2024-11-05 | End: 2024-11-16 | Stop reason: HOSPADM

## 2024-11-05 RX ORDER — SODIUM CHLORIDE 0.9 % (FLUSH) 0.9 %
5-40 SYRINGE (ML) INJECTION PRN
Status: DISCONTINUED | OUTPATIENT
Start: 2024-11-05 | End: 2024-11-16 | Stop reason: HOSPADM

## 2024-11-05 RX ORDER — MULTIVITAMIN WITH IRON
1 TABLET ORAL DAILY
Status: CANCELLED | OUTPATIENT
Start: 2024-11-05

## 2024-11-05 RX ORDER — SODIUM CHLORIDE 9 MG/ML
INJECTION, SOLUTION INTRAVENOUS PRN
Status: DISCONTINUED | OUTPATIENT
Start: 2024-11-05 | End: 2024-11-16 | Stop reason: HOSPADM

## 2024-11-05 RX ORDER — CYCLOBENZAPRINE HCL 10 MG
10 TABLET ORAL 3 TIMES DAILY PRN
Status: CANCELLED | OUTPATIENT
Start: 2024-11-05

## 2024-11-05 RX ORDER — SODIUM CHLORIDE 0.9 % (FLUSH) 0.9 %
5-40 SYRINGE (ML) INJECTION PRN
Status: CANCELLED | OUTPATIENT
Start: 2024-11-05

## 2024-11-05 RX ORDER — ONDANSETRON 4 MG/1
4 TABLET, ORALLY DISINTEGRATING ORAL EVERY 8 HOURS PRN
Status: DISCONTINUED | OUTPATIENT
Start: 2024-11-05 | End: 2024-11-16 | Stop reason: HOSPADM

## 2024-11-05 RX ADMIN — ACETAMINOPHEN 650 MG: 325 TABLET ORAL at 16:51

## 2024-11-05 RX ADMIN — BACITRACIN ZINC NEOMYCIN SULFATE POLYMYXIN B SULFATE: 400; 3.5; 5 OINTMENT TOPICAL at 08:20

## 2024-11-05 RX ADMIN — DOCUSATE SODIUM 100 MG: 100 CAPSULE, LIQUID FILLED ORAL at 19:46

## 2024-11-05 RX ADMIN — ENOXAPARIN SODIUM 40 MG: 100 INJECTION SUBCUTANEOUS at 08:20

## 2024-11-05 RX ADMIN — OXYCODONE 10 MG: 5 TABLET ORAL at 19:45

## 2024-11-05 RX ADMIN — KETOROLAC TROMETHAMINE 30 MG: 30 INJECTION, SOLUTION INTRAMUSCULAR at 13:09

## 2024-11-05 RX ADMIN — Medication 12.5 MG: at 05:11

## 2024-11-05 RX ADMIN — SODIUM CHLORIDE, PRESERVATIVE FREE 10 ML: 5 INJECTION INTRAVENOUS at 19:49

## 2024-11-05 RX ADMIN — ACETAMINOPHEN 650 MG: 325 TABLET ORAL at 21:39

## 2024-11-05 RX ADMIN — Medication 6 MG: at 19:46

## 2024-11-05 RX ADMIN — TRAZODONE HYDROCHLORIDE 50 MG: 50 TABLET ORAL at 21:39

## 2024-11-05 RX ADMIN — ACETAMINOPHEN 650 MG: 325 TABLET ORAL at 10:03

## 2024-11-05 RX ADMIN — PANTOPRAZOLE SODIUM 40 MG: 40 TABLET, DELAYED RELEASE ORAL at 05:11

## 2024-11-05 RX ADMIN — OXYCODONE 5 MG: 5 TABLET ORAL at 08:28

## 2024-11-05 RX ADMIN — Medication 1 TABLET: at 18:35

## 2024-11-05 RX ADMIN — ACETAMINOPHEN 650 MG: 325 TABLET ORAL at 05:11

## 2024-11-05 RX ADMIN — KETOROLAC TROMETHAMINE 30 MG: 30 INJECTION, SOLUTION INTRAMUSCULAR at 03:27

## 2024-11-05 RX ADMIN — KETOROLAC TROMETHAMINE 30 MG: 30 INJECTION, SOLUTION INTRAMUSCULAR at 08:20

## 2024-11-05 RX ADMIN — BACITRACIN ZINC, NEOMYCIN, POLYMYXIN B: 400; 3.5; 5 OINTMENT TOPICAL at 21:48

## 2024-11-05 RX ADMIN — CYCLOBENZAPRINE 10 MG: 10 TABLET, FILM COATED ORAL at 16:51

## 2024-11-05 RX ADMIN — SENNOSIDES 8.6 MG: 8.6 TABLET, FILM COATED ORAL at 19:46

## 2024-11-05 RX ADMIN — SODIUM CHLORIDE, PRESERVATIVE FREE 10 ML: 5 INJECTION INTRAVENOUS at 08:20

## 2024-11-05 RX ADMIN — DOCUSATE SODIUM 100 MG: 100 CAPSULE, LIQUID FILLED ORAL at 08:20

## 2024-11-05 ASSESSMENT — PAIN DESCRIPTION - LOCATION
LOCATION: GROIN
LOCATION: GROIN
LOCATION: PELVIS
LOCATION: HIP
LOCATION: LEG
LOCATION: HIP

## 2024-11-05 ASSESSMENT — PAIN DESCRIPTION - DESCRIPTORS
DESCRIPTORS: ACHING
DESCRIPTORS: THROBBING;TIGHTNESS
DESCRIPTORS: ACHING;HEAVINESS
DESCRIPTORS: ACHING
DESCRIPTORS: THROBBING

## 2024-11-05 ASSESSMENT — PAIN SCALES - GENERAL
PAINLEVEL_OUTOF10: 5
PAINLEVEL_OUTOF10: 5
PAINLEVEL_OUTOF10: 8
PAINLEVEL_OUTOF10: 2
PAINLEVEL_OUTOF10: 8
PAINLEVEL_OUTOF10: 7
PAINLEVEL_OUTOF10: 6
PAINLEVEL_OUTOF10: 5
PAINLEVEL_OUTOF10: 4
PAINLEVEL_OUTOF10: 8
PAINLEVEL_OUTOF10: 5
PAINLEVEL_OUTOF10: 4

## 2024-11-05 ASSESSMENT — PAIN DESCRIPTION - ORIENTATION
ORIENTATION: LEFT
ORIENTATION: LEFT
ORIENTATION: RIGHT;LEFT;MID
ORIENTATION: RIGHT
ORIENTATION: LEFT;MID
ORIENTATION: LEFT

## 2024-11-05 ASSESSMENT — PAIN - FUNCTIONAL ASSESSMENT
PAIN_FUNCTIONAL_ASSESSMENT: ACTIVITIES ARE NOT PREVENTED

## 2024-11-05 ASSESSMENT — PAIN SCALES - WONG BAKER
WONGBAKER_NUMERICALRESPONSE: HURTS A LITTLE BIT
WONGBAKER_NUMERICALRESPONSE: HURTS A LITTLE BIT

## 2024-11-05 NOTE — H&P
Physical Medicine & Rehabilitation Admission History and Physical    Impression:  Status post fall on 11/2/2024 at home resulting  Minimally superiomedially distracted right iliac wing nondisplaced fracture   Nondisplaced left superior pubic rami comminuted fracture  Nondisplaced left inferior pubic ramus fracture  Right forehead contusion  Mild traumatic brain injury/cerebral concussion with brief loss of consciousness  Bilateral anterior knees contusion with skin abrasion  Bilateral posterior elbows contusion with a skin abrasion  Right anterior and lateral ankle sprain with skin open abrasion  Multiple skin abrasion at bilateral upper and lower extremities  Narcotic pain medication induced constipation  Anemia      Plan:   Admit to the inpatient rehabilitation unit.  The patient demonstrates good potential to participate in an inpatient rehabilitation program involving at least 3 hours per day, 5 days per week of intensive rehabilitation.  Rehabilitation services will include PT, OT, and SLP/RT in order to improve functional status prior to discharge.  Family education and training will be completed.  Equipment evaluations and recommendations will be completed as appropriate.       Rehabilitation nursing will be involved for bowel, bladder, skin, and pain management.  Nursing will also provide education and training to patient and family.    Prophylaxis:  DVT: Lovenox, PRIMITIVO stocking, intermittent pneumatic compression device.  GI: Colace, Senokot, Movantik, GlycoLax as needed, milk of magnesium as needed, Dulcolax suppository as needed, Zofran as needed  Pain: Tylenol, Tylenol as needed, Flexeril as needed, oxycodone 5 to 10 mg as needed; add Voltaren gel as needed  Continue Neosporin ointment application to abrasion wound  Continue Protonix for gastric protection  Start multivitamin for anemia  Start melatonin, trazodone as needed for insomnia  Nutrition:  Consultation to dietician for nutritional counseling  tissues in the left flank, consistent with soft tissue  contusion.   Pelvis: Urinary bladder unremarkable. No free air or free fluid is seen in the pelvis. There is a mass lesion in the lower uterine segment, 3 cm diameter, consistent with a uterine fibroid. There appear to be small bilateral ovarian cysts.   IMPRESSION:  1. Uterine fibroid. Bilateral ovarian cyst. Findings of constipation and colonic ileus.  2. Nondisplaced fracture of the right iliac bone superiorly, posteriorly, medially. Nondisplaced comminuted fracture left superior pubic ramus. Nondisplaced acute fracture left inferior pubic ramus.  3. Findings of soft tissue contusion left flank posteriorly.            Portable chest x-ray (11/2/2024) :  IMPRESSION:  Normal mobile chest.        X-ray of left femur (11/2/2024) :  FINDINGS:   No bone, joint, or soft tissue abnormality is seen.  IMPRESSION:  Normal femur.      X-ray of right knee (11/2/2024) :  FINDINGS:   No fracture or dislocation is seen. There is no foreign body. Mild soft tissue swelling overlies the knee anteriorly.   IMPRESSION:  Soft tissue swelling. No fracture.        X-ray of left tibia/fibula (11/2/2024) :  FINDINGS:   No bone, joint, or soft tissue abnormality is seen.  IMPRESSION:  Normal study.      X-ray of left wrist (11/2/2024) :  FINDINGS:   No bone, joint, or soft tissue abnormality is seen.  IMPRESSION:  Normal wrist.      X-ray of pelvis (11/2/2024) :  FINDINGS:   Large amount of contrast material is present in the bladder, partially obscuring the pelvis. There is a known fracture involving the posterior/medial/superior aspect of the right iliac wing which cannot be appreciated on this plain film. There is also a mildly distracted fracture involving the left inferior pubic ramus and a mildly displaced mildly comminuted fracture of the left superior pubic ramus.   IMPRESSION:  Pelvic fractures, described above.          X-ray of pelvis inlet and outlet  (11/2/2024  FINDINGS:  There is a large amount of contrast material within the bladder, partially obscuring the pelvic bones. There is a nondisplaced longitudinally oriented fracture involving the superior/medial most aspect of the right iliac wing. There are also fractures of the left superior and inferior pubic rami, better seen on the prior AP view of the pelvis. No appreciable displacement is seen on the Inlet and outlet views..   IMPRESSION:  1. Minimally distracted right iliac wing superomedially.  2. Known fractures left superior and inferior pubic rami which demonstrate no appreciable displacement on the Inlet and outlet views.            The post admission physician evaluation (YARELI) is consistent with the pre-admission assessment.  See above findings to reflect the elements required in the YARELI.  Patient's admitting condition is consistent with the findings of the preadmission assessment by the rehabilitation admissions coordinator.    ESTELA KISER MD        This report has been created using voice recognition software. It may contain minor errors which are inherent in voice recognition technology

## 2024-11-05 NOTE — PROGRESS NOTES
[facility]  INPATIENT PHYSICAL THERAPY  DAILY NOTE  [facility] ORTHOPEDICS 7K - 7K-06/006-A      Discharge Recommendations: Continue to assess pending progress, Inpatient Rehabilitation, and Patient would benefit from continued PT at discharge  Equipment Recommendations: Yes     Mobility Devices: Walker, Wheelchair  Walker: Rolling      Time In: 1007  Time Out: 1030  Timed Code Treatment Minutes: 23 Minutes  Minutes: 23          Date: 2024  Patient Name: Linda Don,  Gender:  female        MRN: [id number]  : 1990  (34 y.o.)  Referral Date : 24  Referring Practitioner: Carlos Schaffer PA-C  Diagnosis: Multiple closed fractures of pelvis with stable disruption of pelvic ring, initial encounter ([facility])  Additional Pertinent Hx: Per EMR:The patient is a 34 y.o. female with who presented to [facility] emergency department earlier this morning after a transfer from [facility] secondary to pelvic fractures noted.  She had a fall early this morning as she returned home from a night out.  She fell down a flight of stairs that was approximately 12 steps.  Describes pain more so to this left hip.Imaging revealing pt with right iliac wing and left superior and inferior pubic rami fractures     Prior Level of Function:  Lives With: Other (comment) (boyfriend)  Type of Home: Condo  Home Layout: One level  Home Access: Stairs to enter with rails  Entrance Stairs - Number of Steps: 2 flights of steps  Entrance Stairs - Rails: Left   Bathroom Shower/Tub: Walk-in shower  Bathroom Toilet: Standard    ADL Assistance: Independent  Homemaking Assistance: Independent  Ambulation Assistance: Independent  Transfer Assistance: Independent  Active : Yes  Additional Comments: Pt's boyfriend works during the day--his shop is under condo    Restrictions/Precautions:  Restrictions/Precautions: Fall Risk, Weight Bearing  Right Lower Extremity Weight Bearing: Weight Bearing As Tolerated  Left

## 2024-11-05 NOTE — PROGRESS NOTES
[facility]  Acute Inpatient Rehab Preadmission Assessment    Patient Name: Linda Don        Ethnicity:Not of , /a, or [place] origin  Race:Black or   MRN: [id number]    : 1990  (34 y.o.)  Gender: female     Admitted from:[facility]  Initial Assessment    Date of admission to the hospital: 2024  8:29 AM  Date patient eligible for admission:2024    Primary Diagnosis: Multiple fractures of pelvis and mild TBI with loss of consciousness.      Did patient have surgery?  no    Physicians: Christoph Ley DO, Dr. Mota, Dr. Kovacs     Risk for clinical complications/co-morbidities: History reviewed. No pertinent past medical history.    Financial Information  Primary insurance:  Pending Medicaid     Secondary Insurance:   None    Has the patient had two or more falls in the past year or any fall with injury in the past year?   no    Did the patient have major surgery during the 100 days prior to admission?  yes    Precautions:     Restrictions/Precautions: Fall Risk, Weight Bearing  Other position/activity restrictions: Per Orth orders via BRUCE Carpio BLE   Right Lower Extremity Weight Bearing: Weight Bearing As Tolerated  Left Lower Extremity Weight Bearing: Weight Bearing As Tolerated      Isolation Precautions: None       Physiatrist: Dr. Archibald    Patients Occupation: Unemployed, seeking work  Reviewed Lab and Diagnostic reports from Current Admission: Yes    Patients Prior Functional  Level: Prior Function  ADL Assistance: Independent  Homemaking Assistance: Independent  Ambulation Assistance: Independent  Transfer Assistance: Independent  Additional Comments: Pt's boyfriend works during the day--his shop is under condo    Current functional status for upper extremity ADL’s: Contact guard assistance    Current functional status for lower extremity ADL’s: Footwear Management: Stand By Assistance, X 1, with verbal cues, and with

## 2024-11-05 NOTE — PROGRESS NOTES
[facility]  INPATIENT REHABILITATION  TEAM CONFERENCE NOTE    Conference Date: 2024  Admit Date:  2024  3:24 PM  Patient Name: Linda Don    MRN: [id number]    : 1990  (34 y.o.)  Rehabilitation Admitting Diagnosis:  TBI (traumatic brain injury) [S06.9XAA]  Multiple fractures of pelvis with stable disruption of pelvic ring, initial encounter for closed fracture (HCC) [S32.810A]  Referring Practitioner: Ignacio Mota MD      CASE MANAGEMENT  Current issues/needs regarding patient and family discharge status: Prior to admission, patient was living with her boyfriend, Raj. Patient. reported being independent at home. Patient was able to complete her ADLs, some meal prep, housekeeping, and driving. Raj manages the meal prep, errands and finances. Patient does not work. Patient's support includes Raj and her mother, Nighat. Patient does not have a primary care physician prior to admission. Patient prefers [facility]. Patient reports that she applied for Medicaid during her hospital stay. Patient is motivated to participate in therapy and return to her prior level of functioning.     PHYSICAL THERAPY  Patient is a 34 y.o female who presents s/p fall resulting in pelvic fractures. Patient has increased complaints of pain at L hip/groin and has recently been cleared for WBAT on BLE's per ortho. Patient demonstrates decreased strength in BLE's which limits her functional mobility. Patient requires maximal assistance for bed mobility, CGA to Minimal assistance for sit<>stand transfers with increased time to comlpete and CGA for ambulation with heavy reliance on BUE support on RW with decreased tolerance to weight shifting onto LLE. Patient overall can benefit from skilled PT treatment in order to assist with BLE strengthening, gait training, transfer trianing, bed mobility and core strengthening for increased functional mobility.  Equipment Needed: Yes (Continue to assess

## 2024-11-05 NOTE — DISCHARGE SUMMARY
Physician Discharge Summary     Patient ID:  Linda Don  [id number]  34 y.o.  1990    Admit date: 11/2/2024    Discharge date and time: 11/5/24    Admitting Physician: Christoph Ley DO     Discharge Physician: Christoph Ley DO     Admission Diagnoses: Abrasions of multiple sites [T07.XXXA]  Multiple contusions [T07.XXXA]  Fall down stairs, initial encounter [W10.8XXA]  Closed fracture of single pubic ramus of pelvis, left, initial encounter ([facility]) [S32.592A]  Closed fracture of right iliac wing, initial encounter ([facility]) [S32.301A]  Closed fracture of multiple pubic rami, left, sequela [S32.592S]  Multiple closed fractures of pelvis with stable disruption of pelvic ring, initial encounter (HCC) [S32.810A]  Traumatic injury of pelvis [S39.93XA]    Discharge Diagnoses: Abrasions of multiple sites [T07.XXXA]  Multiple contusions [T07.XXXA]  Fall down stairs, initial encounter [W10.8XXA]  Closed fracture of single pubic ramus of pelvis, left, initial encounter (HCC) [S32.592A]  Closed fracture of right iliac wing, initial encounter ([facility]) [S32.301A]  Closed fracture of multiple pubic rami, left, sequela [S32.592S]  Multiple closed fractures of pelvis with stable disruption of pelvic ring, initial encounter ([facility]) [S32.810A]  Traumatic injury of pelvis [S39.93XA]    Admission Condition: fair    Discharged Condition: good    Indication for Admission: unstable orthopaedic injury    Hospital Course: Patient sustained a fall resulting in a R iliac wing and L pubic rami fracture. Closed treatment was recommended, admission for pain control and mobilization addressed. Uneventful course of care during admission to hospital, stable vitals, good efforts with mobilization and pain well controlled. Deemed appropriate for IPR per PT OT, accepted. On the day of discharge all vitals were stable. Follow up 2 weeks Dr Ruiz for repeat imaging.     Discharge Exam:  Please see final progress note for appropriate discharge

## 2024-11-05 NOTE — PROGRESS NOTES
[facility]  [facility] ORTHOPED[facility] 7K  Occupational Therapy  Daily Note    Discharge Recommendations: Continue to assess pending progress, Inpatient Rehabilitation, and Patient would benefit from continued OT at discharge  Equipment Recommendations:   Continue to assess pending progress and discharge location.  Pt has potential need for wheelchair, BSC and tub transfer bench.       Time In: 1041  Time Out: 1105  Timed Code Treatment Minutes: 24 Minutes  Minutes: 24          Date: 2024  Patient Name: Linda Don,   Gender: female      Room: [facility]006-A  MRN: [id number]  : 1990  (34 y.o.)  Referring Practitioner: Carlos Schaffer PA-C  Diagnosis: Multiple closed fractures of pelvis with stable disruption of pelvic ring, initial encounter ([facility])  Additional Pertinent Hx: Per EMR:The patient is a 34 y.o. female with who presented to [facility] emergency department earlier this morning after a transfer from [facility] secondary to pelvic fractures noted.  She had a fall early this morning as she returned home from a night out.  She fell down a flight of stairs that was approximately 12 steps.  Describes pain more so to this left hip.Imaging revealing pt with right iliac wing and left superior and inferior pubic rami fractures    Restrictions/Precautions:  Restrictions/Precautions: Fall Risk, Weight Bearing  Right Lower Extremity Weight Bearing: Weight Bearing As Tolerated  Left Lower Extremity Weight Bearing: Weight Bearing As Tolerated  Position Activity Restriction  Other position/activity restrictions: Per Orth orders via Shirin WBAT BLE       Social/Functional History:  Lives With: Other (comment) (boyfriend)  Type of Home: Condo  Home Layout: One level  Home Access: Stairs to enter with rails  Entrance Stairs - Number of Steps: 2 flights of steps  Entrance Stairs - Rails: Left   Bathroom Shower/Tub: Walk-in shower  Bathroom Toilet: Standard       ADL Assistance:

## 2024-11-05 NOTE — PLAN OF CARE
Problem: Discharge Planning  Goal: Discharge to home or other facility with appropriate resources  11/4/2024 2243 by Lizett Verde RN  Outcome: Progressing  Flowsheets (Taken 11/4/2024 1743 by Liana Grace RN)  Discharge to home or other facility with appropriate resources: Identify barriers to discharge with patient and caregiver     Problem: Pain  Goal: Verbalizes/displays adequate comfort level or baseline comfort level  11/4/2024 2243 by Lizett Verde RN  Outcome: Progressing  Flowsheets (Taken 11/4/2024 1743 by Liana Grace RN)  Verbalizes/displays adequate comfort level or baseline comfort level: Encourage patient to monitor pain and request assistance     Problem: Skin/Tissue Integrity  Goal: Absence of new skin breakdown  Description: 1.  Monitor for areas of redness and/or skin breakdown  2.  Assess vascular access sites hourly  3.  Every 4-6 hours minimum:  Change oxygen saturation probe site  4.  Every 4-6 hours:  If on nasal continuous positive airway pressure, respiratory therapy assess nares and determine need for appliance change or resting period.  11/4/2024 2243 by Lizett Verde RN  Outcome: Progressing     Problem: Safety - Adult  Goal: Free from fall injury  11/4/2024 2243 by Lizett Verde RN  Outcome: Progressing  Flowsheets (Taken 11/4/2024 1743 by Liana Grace RN)  Free From Fall Injury: Instruct family/caregiver on patient safety     Problem: Musculoskeletal - Adult  Goal: Return mobility to safest level of function  11/4/2024 2243 by Lizett Verde RN  Outcome: Progressing  Flowsheets (Taken 11/4/2024 1743 by Liana Grace RN)  Return Mobility to Safest Level of Function: Assess patient stability and activity tolerance for standing, transferring and ambulating with or without assistive devices     Problem: Musculoskeletal - Adult  Goal: Return ADL status to a safe level of function  11/4/2024 2243 by Lizett Verde RN  Outcome:  Progressing  Flowsheets (Taken 11/4/2024 1743 by Liana Grace, RN)  Return ADL Status to a Safe Level of Function: Administer medication as ordered     Problem: ABCDS Injury Assessment  Goal: Absence of physical injury  11/4/2024 2243 by Lizett Verde, RN  Outcome: Progressing  Flowsheets (Taken 11/4/2024 1743 by Liana Grace, RN)  Absence of Physical Injury: Implement safety measures based on patient assessment   Careplan reviewed with patient. Patient verbalized understanding of the plan of care.

## 2024-11-05 NOTE — DISCHARGE INSTR - COC
Continuity of Care Form    Patient Name: Linda Don   :  1990  MRN:  [id number]    Admit date:  2024  Discharge date:  ***    Code Status Order: Full Code   Advance Directives:   Advance Care Flowsheet Documentation             Admitting Physician:  Christoph Ley DO  PCP: No primary care provider on file.    Discharging Nurse: ***  Discharging Hospital Unit/Room#: 7K-06/006-A  Discharging Unit Phone Number: ***    Emergency Contact:   Extended Emergency Contact Information  Primary Emergency Contact: Raj Malcolm  Home Phone: [phone]  Relation: Boyfriend  Preferred language: English   needed? No  Secondary Emergency Contact: Nighat Quiroz  Home Phone: [phone]  Relation: Parent    Past Surgical History:  History reviewed. No pertinent surgical history.    Immunization History:   Immunization History   Administered Date(s) Administered    TDaP, ADACEL (age 10y-64y), BOOSTRIX (age 10y+), IM, 0.5mL 2024       Active Problems:  Patient Active Problem List   Diagnosis Code    Multiple closed fractures of pelvis with stable disruption of pelvic ring, initial encounter ([facility]) S32.810A    Abrasion of knee, bilateral S80.211A, S80.212A    Abrasion of right chest wall S20.311A    Abrasion of elbow, right, initial encounter S50.311A    Abrasion of elbow, left, initial encounter S50.312A    Closed fracture of multiple pubic rami, left, sequela S32.592S       Isolation/Infection:   Isolation            No Isolation          Patient Infection Status       None to display            Nurse Assessment:  Last Vital Signs: /61   Pulse 87   Temp 97.9 °F (36.6 °C) (Oral)   Resp 20   Ht 1.575 m (5' 2\")   Wt 54 kg (119 lb)   LMP 10/17/2024 (Approximate)   SpO2 98%   BMI 21.77 kg/m²     Last documented pain score (0-10 scale): Pain Level: 4  Last Weight:   Wt Readings from Last 1 Encounters:   24 54 kg (119 lb)     Mental Status:  {IP PT MENTAL STATUS:54995}    IV

## 2024-11-05 NOTE — PROGRESS NOTES
Physical Medicine & Rehabilitation Progress Note    Chief Complaint:  Fall on stairs resulting pain at lower back and left groin     Subjective:    Linda Don is a 34 y.o. right-handed -American female with no significant past medical history, was admitted on 11/5/2024 for intensive inpatient management of impairment & disability secondary to injuries sustained in a fall accident on the stair at home on 11/2/2024.     The patient says she stayed over the night in a casino and returned home on early 11/2/2024 morning.  While she was walking up the stairs to reach her condo apartment on the second floor, her foot slipped causing her to lose balance because of slippery wooden floor with presence of her sock.  She then fell down to the bottom of the stair (approximately 12 steps).  She says her forehead hit something and she blacked out briefly.  Initially she feels severe pain at her lower back, right knee and right ankle.  When she tried to get up using her left lower extremity, she felt severe pain at her left groin.  Her crying and screaming woke her boyfriend up.  After she calmed down, she asked her boyfriend to bring her to emergency room.  Therefore her boyfriend brought her to [facility] ER for evaluation.  CT of abdomen and pelvis, and lumbar spine were performed at [facility] ER.  She was found to have significant pelvis fracture.  The patient then was transferred to [facility] for further care.  Images of abdomen/pelvis and the lumbar spine CT from [facility] were reviewed by radiologist.  She was found to have nondisplaced right iliac bone fracture superiorly, posteriorly and medially, nondisplaced left superior pubic rami's comminuted fracture, and nondisplaced acute left inferior pubic ramus fracture.  Because of multiple areas of skin abrasion wounds, additional x-ray of chest, left femur, right knee, left tibia/fibula, left wrist were done at [facility]

## 2024-11-05 NOTE — PROGRESS NOTES
Rehab admission today.  Patient is a discharge/readmit to [id number] please call report to [phone].  ANTONIO Man and TE Landrum updated.

## 2024-11-05 NOTE — PROGRESS NOTES
Spiritual Health History and Assessment/Progress Note  [facility]    (P) Attempted Encounter,  ,  ,      Name: Linda Don MRN: [id number]    Age: 34 y.o.     Sex: female   Language: English   Caodaism: Confucianist   Multiple closed fractures of pelvis with stable disruption of pelvic ring, initial encounter ([facility])     Date: 11/5/2024            Total Time Calculated: (P) 5 min              Spiritual Assessment began in [facility] ORTHOPEDICS 7K        Referral/Consult From: (P) Rounding   Encounter Overview/Reason: (P) Attempted Encounter  Service Provided For: (P) Patient    Gloria, Belief, Meaning:   Patient unable to assess at this time  Family/Friends No family/friends present      Importance and Influence:  Patient unable to assess at this time  Family/Friends No family/friends present    Community:  Patient Other: Patient asleep  Family/Friends No family/friends present    Assessment and Plan of Care:     Patient Interventions include: Other: Patient asleep  Family/Friends Interventions include: No family/friends present    Patient Plan of Care: Spiritual Care available upon further referral  Family/Friends Plan of Care: No family/friends present    Electronically signed by Chaplain Chiqui on 11/5/2024 at 10:44 AM     11/05/24 1043   Encounter Summary   Encounter Overview/Reason Attempted Encounter   Service Provided For Patient   Referral/Consult From [facility]   Support System Unknown   Last Encounter  11/05/24   Complexity of Encounter Low   Begin Time 0955   End Time  1000   Total Time Calculated 5 min   Spiritual/Emotional needs   Type Spiritual Support   Assessment/Intervention/Outcome   Assessment Unable to assess   Intervention Prayer (assurance of)/Temple   Outcome Did not respond   Plan and Referrals   Plan/Referrals Continue to visit, (comment)     Patient was asleep at attempted visit.

## 2024-11-05 NOTE — PROGRESS NOTES
Rounded on unit, introduced self to patient.  Explained rehab philosophy including hours of therapy being 7 a.m. until about 4 p.m. M-F.    Explained visitors to rehab are welcome but patient must keep in mind the therapy sessions are top priority for her recovery.  Encouraged visitors to visit after therapy day is complete and visiting hours are over at 9 p.m.     Patient verbalized interest in pursuing admission to [facility].  Primary RN Georgia updated on conversation with patient.

## 2024-11-05 NOTE — PROGRESS NOTES
Admitted to the Inpatient Rehabilitation Unit via bed.  Patient was then oriented to room and unit.  Education provided on the rehabilitation routine: three hours of therapy five days per week.      Explained patients right to have family, representative or physician notified of their admission.  Patient has Declined for physician to be notified.  Patient has Requested for family/representative to be notified.    Admitting medication orders compared with acute stay medications; home medication list reviewed with patient/family.  Medication issues identified No  Medication issue: n/a  If yes, physician notified  n/a .    Vaccination Status  Have you ever received a COVID-19 vaccine? Yes  date of last vaccine: 11/2022, Brand: Pfizer      Transportation:   Has transportation kept you from medical appointments, meetings, work, or from getting things needed for daily living? (Check all that apply)  No.      Health Literacy:   How often do you need to have someone help you when you read instructions, pamphlets, or other written material from your doctor or pharmacy?  0. - Never    Social Isolation:  How often do you feel lonely or isolated from those around you?  0. Never    Patient Mood Interview (PHQ-2 to 9) (from [employer]©)   Say to Patient: \"Over the last 2 weeks, have you been bothered by any of the following problems?\"   If symptom is present, enter yes in column 1 (Symptom Presence)  If yes in column 1, then ask the patient: “About how often have you been bothered by this?” Indicate response in column 2, Symptom Frequency.   Symptom Presence  No    Yes   9.    No response  Symptom Frequency  Never or 1 day  2-6 days (several days)  7-11 days (half or more of the days)  12-14 days (nearly every day)    Symptom Presence Symptom Frequency   Little interest or pleasure in doing things 0. No 0. Never or 1 day   Feeling down, depressed, or hopeless 0. No 0. Never or 1 day   If either A or B above has symptom frequency

## 2024-11-05 NOTE — CARE COORDINATION

## 2024-11-05 NOTE — PROGRESS NOTES
Orthopaedic Progress Note      SUBJECTIVE   Ms. Don is hospital day 4, fall, pelvic fractures  R iliac wing, L pubic rami     no adverse events overnight  Pain control tenuous  Advanced diet  No nausea vomiting chest pain sob headache dizziness  Mobilization has improved since advancing Wb status       OBJECTIVE      Physical    VITALS:  /61   Pulse 87   Temp 97.9 °F (36.6 °C) (Oral)   Resp 20   Ht 1.575 m (5' 2\")   Wt 54 kg (119 lb)   LMP 10/17/2024 (Approximate)   SpO2 98%   BMI 21.77 kg/m²   I/O last 3 completed shifts:  In: 2540 [P.O.:2540]  Out: -     6/10 pain  Gen: alert and oriented  Head: normorcephalic, atraumatic  Resp: unlabored, room air  Pelvis: stable  RLE:- atraumatic hip, knee, ankle, abrasions to the knee, no effusion of deep wounds, no lacerations otherwise. Sitting in neutral alignment. Compartments soft.  Nontender to palpation asis, TTP iliac crest to PSIS, nontender greater troch or groin, nontender femoral shaft, nontender medial lateral joint line, tibia shaft, medial lateral mal, midfoot, calc, calf supple nontender,  Able to perform SLR, gastroc ta ehl intact, painless IR ER through hip. sensation to light touch intact, distal pulses palpable    LLE:- atraumatic hip, knee, ankle, superficial abrasions to the knee,no associated effusion at the joint, no lacerations or lesions otherwise. Sitting in neutral alignment. Compartments soft.  Nontender to palpation asis iliac crest greater troch, TTP groin, nontender femoral shaft, nontender medial lateral joint line, tibia shaft, medial lateral mal, midfoot, calc, calf supple nontender,  Able to perform SLR, some anterior thigh pain with this, gastroc ta ehl intact, painless IR ER through hip. sensation to light touch intact, distal pulses palpable    Data  CBC:   Lab Results   Component Value Date/Time    WBC 7.7 11/05/2024 06:09 AM    HGB 9.5 11/05/2024 06:09 AM     11/05/2024 06:09 AM     BMP:    Lab Results   Component  Value Date/Time     11/05/2024 06:09 AM    K 3.8 11/05/2024 06:09 AM     11/05/2024 06:09 AM    CO2 24 11/05/2024 06:09 AM    BUN 4 11/05/2024 06:09 AM    CREATININE 0.5 11/05/2024 06:09 AM    CALCIUM 8.4 11/05/2024 06:09 AM    GLUCOSE 90 11/05/2024 06:09 AM     Uric Acid:  No components found for: \"URIC\"  PT/INR:  No results found for: \"PROTIME\", \"INR\"  Troponin:  No results found for: \"TROPONINI\"  Urine Culture:  No components found for: \"CURINE\"      Current Inpatient Medications    Current Facility-Administered Medications: oxyCODONE (ROXICODONE) immediate release tablet 5 mg, 5 mg, Oral, Q4H PRN **OR** oxyCODONE (ROXICODONE) immediate release tablet 10 mg, 10 mg, Oral, Q4H PRN  cyclobenzaprine (FLEXERIL) tablet 10 mg, 10 mg, Oral, TID PRN  ketorolac (TORADOL) injection 30 mg, 30 mg, IntraVENous, Q6H  pantoprazole (PROTONIX) tablet 40 mg, 40 mg, Oral, QAM AC  docusate sodium (COLACE) capsule 100 mg, 100 mg, Oral, BID  senna (SENOKOT) tablet 8.6 mg, 1 tablet, Oral, Nightly  bisacodyl (DULCOLAX) suppository 10 mg, 10 mg, Rectal, Daily PRN  magnesium hydroxide (MILK OF MAGNESIA) 400 MG/5ML suspension 15 mL, 15 mL, Oral, Daily PRN  naloxegol (MOVANTIK) tablet 12.5 mg, 12.5 mg, Oral, QAM AC  enoxaparin (LOVENOX) injection 40 mg, 40 mg, SubCUTAneous, Daily  neomycin-bacitracin-polymyxin (NEOSPORIN) ointment, , Topical, BID  sodium chloride flush 0.9 % injection 5-40 mL, 5-40 mL, IntraVENous, 2 times per day  sodium chloride flush 0.9 % injection 5-40 mL, 5-40 mL, IntraVENous, PRN  0.9 % sodium chloride infusion, , IntraVENous, PRN  ondansetron (ZOFRAN-ODT) disintegrating tablet 4 mg, 4 mg, Oral, Q8H PRN **OR** ondansetron (ZOFRAN) injection 4 mg, 4 mg, IntraVENous, Q6H PRN  polyethylene glycol (GLYCOLAX) packet 17 g, 17 g, Oral, Daily PRN  acetaminophen (TYLENOL) tablet 650 mg, 650 mg, Oral, Q6H  morphine (PF) injection 2 mg, 2 mg, IntraVENous, Q2H PRN        PLAN    Ms. Don is hospital day 4  fall,

## 2024-11-06 LAB
25(OH)D3 SERPL-MCNC: 12 NG/ML (ref 30–100)
ALBUMIN SERPL BCG-MCNC: 3.3 G/DL (ref 3.5–5.1)
ALP SERPL-CCNC: 64 U/L (ref 38–126)
ALT SERPL W/O P-5'-P-CCNC: 15 U/L (ref 11–66)
ANION GAP SERPL CALC-SCNC: 11 MEQ/L (ref 8–16)
AST SERPL-CCNC: 25 U/L (ref 5–40)
BACTERIA URNS QL MICRO: ABNORMAL /HPF
BASOPHILS ABSOLUTE: 0 THOU/MM3 (ref 0–0.1)
BASOPHILS NFR BLD AUTO: 0.2 %
BILIRUB SERPL-MCNC: 1 MG/DL (ref 0.3–1.2)
BILIRUB UR QL STRIP.AUTO: NEGATIVE
BUN SERPL-MCNC: 6 MG/DL (ref 7–22)
CALCIUM SERPL-MCNC: 8.4 MG/DL (ref 8.5–10.5)
CASTS #/AREA URNS LPF: ABNORMAL /LPF
CASTS 2: ABNORMAL /LPF
CHARACTER UR: ABNORMAL
CHLORIDE SERPL-SCNC: 103 MEQ/L (ref 98–111)
CHOLEST SERPL-MCNC: 143 MG/DL (ref 100–199)
CO2 SERPL-SCNC: 23 MEQ/L (ref 23–33)
COLOR, UA: YELLOW
CREAT SERPL-MCNC: 0.7 MG/DL (ref 0.4–1.2)
CRYSTALS URNS MICRO: ABNORMAL
DEPRECATED RDW RBC AUTO: 49 FL (ref 35–45)
EOSINOPHIL NFR BLD AUTO: 2.6 %
EOSINOPHILS ABSOLUTE: 0.3 THOU/MM3 (ref 0–0.4)
EPITHELIAL CELLS, UA: ABNORMAL /HPF
ERYTHROCYTE [DISTWIDTH] IN BLOOD BY AUTOMATED COUNT: 14.8 % (ref 11.5–14.5)
GFR SERPL CREATININE-BSD FRML MDRD: > 90 ML/MIN/1.73M2
GLUCOSE SERPL-MCNC: 109 MG/DL (ref 70–108)
GLUCOSE UR QL STRIP.AUTO: NEGATIVE MG/DL
HCT VFR BLD AUTO: 29.6 % (ref 37–47)
HDLC SERPL-MCNC: 54 MG/DL
HGB BLD-MCNC: 9.4 GM/DL (ref 12–16)
HGB UR QL STRIP.AUTO: ABNORMAL
IMM GRANULOCYTES # BLD AUTO: 0.06 THOU/MM3 (ref 0–0.07)
IMM GRANULOCYTES NFR BLD AUTO: 0.6 %
KETONES UR QL STRIP.AUTO: NEGATIVE
LDLC SERPL CALC-MCNC: 72 MG/DL
LYMPHOCYTES ABSOLUTE: 0.9 THOU/MM3 (ref 1–4.8)
LYMPHOCYTES NFR BLD AUTO: 8.4 %
MAGNESIUM SERPL-MCNC: 1.7 MG/DL (ref 1.6–2.4)
MCH RBC QN AUTO: 28.9 PG (ref 26–33)
MCHC RBC AUTO-ENTMCNC: 31.8 GM/DL (ref 32.2–35.5)
MCV RBC AUTO: 91.1 FL (ref 81–99)
MISCELLANEOUS 2: ABNORMAL
MONOCYTES ABSOLUTE: 0.8 THOU/MM3 (ref 0.4–1.3)
MONOCYTES NFR BLD AUTO: 7.7 %
NEUTROPHILS ABSOLUTE: 8.8 THOU/MM3 (ref 1.8–7.7)
NEUTROPHILS NFR BLD AUTO: 80.5 %
NITRITE UR QL STRIP: POSITIVE
NRBC BLD AUTO-RTO: 0 /100 WBC
PH UR STRIP.AUTO: 6.5 [PH] (ref 5–9)
PLATELET # BLD AUTO: 253 THOU/MM3 (ref 130–400)
PMV BLD AUTO: 10.8 FL (ref 9.4–12.4)
POTASSIUM SERPL-SCNC: 3.5 MEQ/L (ref 3.5–5.2)
PREALB SERPL-MCNC: 17.7 MG/DL (ref 20–40)
PROT SERPL-MCNC: 6.2 G/DL (ref 6.1–8)
PROT UR STRIP.AUTO-MCNC: 30 MG/DL
RBC # BLD AUTO: 3.25 MILL/MM3 (ref 4.2–5.4)
RBC URINE: ABNORMAL /HPF
RENAL EPI CELLS #/AREA URNS HPF: ABNORMAL /[HPF]
SODIUM SERPL-SCNC: 137 MEQ/L (ref 135–145)
SP GR UR REFRACT.AUTO: 1.02 (ref 1–1.03)
TRIGL SERPL-MCNC: 86 MG/DL (ref 0–199)
UROBILINOGEN, URINE: 2 EU/DL (ref 0–1)
WBC # BLD AUTO: 10.9 THOU/MM3 (ref 4.8–10.8)
WBC #/AREA URNS HPF: > 200 /HPF
WBC #/AREA URNS HPF: ABNORMAL /[HPF]
YEAST LIKE FUNGI URNS QL MICRO: ABNORMAL

## 2024-11-06 PROCEDURE — 2580000003 HC RX 258: Performed by: PHYSICAL MEDICINE & REHABILITATION

## 2024-11-06 PROCEDURE — 87077 CULTURE AEROBIC IDENTIFY: CPT

## 2024-11-06 PROCEDURE — 97112 NEUROMUSCULAR REEDUCATION: CPT

## 2024-11-06 PROCEDURE — 1180000000 HC REHAB R&B

## 2024-11-06 PROCEDURE — 97530 THERAPEUTIC ACTIVITIES: CPT

## 2024-11-06 PROCEDURE — 97116 GAIT TRAINING THERAPY: CPT

## 2024-11-06 PROCEDURE — 87186 SC STD MICRODIL/AGAR DIL: CPT

## 2024-11-06 PROCEDURE — 84134 ASSAY OF PREALBUMIN: CPT

## 2024-11-06 PROCEDURE — 80053 COMPREHEN METABOLIC PANEL: CPT

## 2024-11-06 PROCEDURE — 97162 PT EVAL MOD COMPLEX 30 MIN: CPT

## 2024-11-06 PROCEDURE — 6360000002 HC RX W HCPCS: Performed by: PHYSICAL MEDICINE & REHABILITATION

## 2024-11-06 PROCEDURE — 92523 SPEECH SOUND LANG COMPREHEN: CPT

## 2024-11-06 PROCEDURE — 97166 OT EVAL MOD COMPLEX 45 MIN: CPT

## 2024-11-06 PROCEDURE — 99232 SBSQ HOSP IP/OBS MODERATE 35: CPT | Performed by: PHYSICAL MEDICINE & REHABILITATION

## 2024-11-06 PROCEDURE — 85025 COMPLETE CBC W/AUTO DIFF WBC: CPT

## 2024-11-06 PROCEDURE — 82306 VITAMIN D 25 HYDROXY: CPT

## 2024-11-06 PROCEDURE — 80061 LIPID PANEL: CPT

## 2024-11-06 PROCEDURE — 6370000000 HC RX 637 (ALT 250 FOR IP): Performed by: PHYSICAL MEDICINE & REHABILITATION

## 2024-11-06 PROCEDURE — 87086 URINE CULTURE/COLONY COUNT: CPT

## 2024-11-06 PROCEDURE — 36415 COLL VENOUS BLD VENIPUNCTURE: CPT

## 2024-11-06 PROCEDURE — 97535 SELF CARE MNGMENT TRAINING: CPT

## 2024-11-06 PROCEDURE — 87147 CULTURE TYPE IMMUNOLOGIC: CPT

## 2024-11-06 PROCEDURE — 6370000000 HC RX 637 (ALT 250 FOR IP): Performed by: FAMILY MEDICINE

## 2024-11-06 PROCEDURE — 83735 ASSAY OF MAGNESIUM: CPT

## 2024-11-06 PROCEDURE — 81001 URINALYSIS AUTO W/SCOPE: CPT

## 2024-11-06 PROCEDURE — 97110 THERAPEUTIC EXERCISES: CPT

## 2024-11-06 RX ORDER — CALCIUM CARBONATE 500(1250)
500 TABLET ORAL 2 TIMES DAILY WITH MEALS
Status: DISCONTINUED | OUTPATIENT
Start: 2024-11-06 | End: 2024-11-16 | Stop reason: HOSPADM

## 2024-11-06 RX ORDER — FERROUS SULFATE 325(65) MG
325 TABLET ORAL 2 TIMES DAILY WITH MEALS
Status: DISCONTINUED | OUTPATIENT
Start: 2024-11-06 | End: 2024-11-16 | Stop reason: HOSPADM

## 2024-11-06 RX ADMIN — OXYCODONE 10 MG: 5 TABLET ORAL at 07:26

## 2024-11-06 RX ADMIN — OXYCODONE 10 MG: 5 TABLET ORAL at 16:40

## 2024-11-06 RX ADMIN — OXYCODONE 10 MG: 5 TABLET ORAL at 23:25

## 2024-11-06 RX ADMIN — FERROUS SULFATE TAB 325 MG (65 MG ELEMENTAL FE) 325 MG: 325 (65 FE) TAB at 16:40

## 2024-11-06 RX ADMIN — ACETAMINOPHEN 650 MG: 325 TABLET ORAL at 10:34

## 2024-11-06 RX ADMIN — Medication 12.5 MG: at 05:40

## 2024-11-06 RX ADMIN — TRAZODONE HYDROCHLORIDE 50 MG: 50 TABLET ORAL at 21:54

## 2024-11-06 RX ADMIN — CALCIUM 500 MG: 500 TABLET ORAL at 16:40

## 2024-11-06 RX ADMIN — ENOXAPARIN SODIUM 40 MG: 100 INJECTION SUBCUTANEOUS at 08:41

## 2024-11-06 RX ADMIN — PANTOPRAZOLE SODIUM 40 MG: 40 TABLET, DELAYED RELEASE ORAL at 05:40

## 2024-11-06 RX ADMIN — DICLOFENAC SODIUM 2 G: 10 GEL TOPICAL at 19:45

## 2024-11-06 RX ADMIN — DOCUSATE SODIUM 100 MG: 100 CAPSULE, LIQUID FILLED ORAL at 19:45

## 2024-11-06 RX ADMIN — ACETAMINOPHEN 650 MG: 325 TABLET ORAL at 21:54

## 2024-11-06 RX ADMIN — SODIUM CHLORIDE, PRESERVATIVE FREE 10 ML: 5 INJECTION INTRAVENOUS at 08:42

## 2024-11-06 RX ADMIN — ACETAMINOPHEN 650 MG: 325 TABLET ORAL at 16:41

## 2024-11-06 RX ADMIN — DICLOFENAC SODIUM 2 G: 10 GEL TOPICAL at 08:41

## 2024-11-06 RX ADMIN — SODIUM CHLORIDE, PRESERVATIVE FREE 10 ML: 5 INJECTION INTRAVENOUS at 19:45

## 2024-11-06 RX ADMIN — Medication 6 MG: at 19:45

## 2024-11-06 RX ADMIN — SENNOSIDES 8.6 MG: 8.6 TABLET, FILM COATED ORAL at 19:45

## 2024-11-06 RX ADMIN — Medication 1 TABLET: at 08:41

## 2024-11-06 RX ADMIN — ACETAMINOPHEN 650 MG: 325 TABLET ORAL at 05:40

## 2024-11-06 RX ADMIN — DOCUSATE SODIUM 100 MG: 100 CAPSULE, LIQUID FILLED ORAL at 08:41

## 2024-11-06 ASSESSMENT — PAIN DESCRIPTION - ORIENTATION
ORIENTATION: LEFT
ORIENTATION: RIGHT;LEFT
ORIENTATION: LEFT
ORIENTATION: LOWER
ORIENTATION: LOWER
ORIENTATION: LEFT

## 2024-11-06 ASSESSMENT — PAIN - FUNCTIONAL ASSESSMENT: PAIN_FUNCTIONAL_ASSESSMENT: ACTIVITIES ARE NOT PREVENTED

## 2024-11-06 ASSESSMENT — PAIN DESCRIPTION - DESCRIPTORS
DESCRIPTORS: ACHING
DESCRIPTORS: ACHING
DESCRIPTORS: ACHING;TEARING
DESCRIPTORS: ACHING
DESCRIPTORS: ACHING
DESCRIPTORS: THROBBING;TIGHTNESS

## 2024-11-06 ASSESSMENT — PAIN SCALES - GENERAL
PAINLEVEL_OUTOF10: 9
PAINLEVEL_OUTOF10: 10
PAINLEVEL_OUTOF10: 4
PAINLEVEL_OUTOF10: 8
PAINLEVEL_OUTOF10: 7
PAINLEVEL_OUTOF10: 8
PAINLEVEL_OUTOF10: 4
PAINLEVEL_OUTOF10: 9

## 2024-11-06 ASSESSMENT — PAIN DESCRIPTION - LOCATION
LOCATION: LEG
LOCATION: HIP
LOCATION: GROIN
LOCATION: BACK
LOCATION: GROIN
LOCATION: BACK;GROIN

## 2024-11-06 NOTE — PROGRESS NOTES
[facility]  INPATIENT PHYSICAL THERAPY  EVALUATION  [facility] - 8K-14/014-A    Discharge Recommendations: Continue to assess pending progress, Patient would benefit from continued therapy after discharge  Equipment Recommendations: Yes (Continue to assess pending progress (Patient will likely require RW and possible manual whelchair))               Time In: 0700  Time Out: 0830  Timed Code Treatment Minutes: 76 Minutes  Minutes: 90          Date: 2024  Patient Name: Linda Don,  Gender:  female        MRN: [id number]  : 1990  (34 y.o.)      Referring Practitioner: Ignacio Mota MD  Diagnosis: Multiple fractures of pelvis with stable disruption of pelvic ring, initial encounter for closed fracture (HCC)  Additional Pertinent Hx: Per H&P \"Linda Don is a 34 y.o. right-handed -American female with no significant past medical history, is admitted to the inpatient rehabilitation unit on 2024 for intensive inpatient rehabilitation treatment of impairment and disability secondary to injuries sustained in a fall accident on the stairs at home on 2024.     The patient says she stayed [place] the night in a casino and returned home on early 2024 morning.  While she was walking up the stairs to reach her condo apartment on the second floor, her foot slipped causing her to lose balance because of slippery wooden floor with presence of her sock.  She then fell down to the bottom of the stair (approximately 12 steps).  She says her forehead hit something and she blacked out briefly.  Initially she feels severe pain at her lower back, right knee and right ankle.  When she tried to get up using her left lower extremity, she felt severe pain at her left groin.  Her crying and screaming woke her boyfriend up.  After she calmed down, she asked her boyfriend to bring her to emergency room.  Therefore her boyfriend brought her to [facility]  hip adduction ball squeezes. Exercises were completed for increased independence with functional mobility.    *Patient instructed in 1 set of 10 reps of supine quad sets, heel slides and hip abduction/adduction in order to assist with BLE strengthening for increased ease with functional mobility.    Functional Outcome Measures:  Not completed    Modified [place]:  Premorbid Functional Status: Not Applicable  Current Functional Status:  Not Applicable    ASSESSMENT:  Activity Tolerance:  Patient tolerance of treatment:Fair. Limited due to pain    Treatment Initiated: Treatment and education initiated within context of evaluation.  Evaluation time included review of current medical information, gathering information related to past medical, social and functional history, completion of standardized testing, formal and informal observation of tasks, assessment of data and development of plan of care and goals.  Treatment time included skilled education and facilitation of tasks to increase safety and independence with functional mobility for improved independence and quality of life.    Assessment:  Body Structures, Functions, Activity Limitations Requiring Skilled Therapeutic Intervention: Decreased functional mobility , Decreased strength, Decreased endurance, Decreased balance, Increased pain, Decreased ROM, Decreased body mechanics  Assessment: Patient is a 34 y.o female who presents s/p fall resulting in pelvic fractures. Patient has increased complaints of pain at L hip/groin and has recently been cleared for WBAT on BLE's per ortho. Patient demonstrates decreased strength in BLE's which limits her functional mobility. Patient requires maximal assistance for bed mobility, CGA to Minimal assistance for sit<>stand transfers with increased time to comlpete and CGA for ambulation with heavy reliance on BUE support on RW with decreased tolerance to weight shifting onto LLE. Patient overall can benefit from skilled PT

## 2024-11-06 NOTE — PLAN OF CARE
Problem: Discharge Planning  Goal: Discharge to home or other facility with appropriate resources  2024 1423 by Autumn Quiros LISW-S  Note:   [facility]  Physical Medicine Case Management Assessment    [x] Inpatient Rehabilitation Unit    Patient Name: Linda Don        MRN: [id number]    : 1990  (34 y.o.)  Gender: female   Date of Admission: 2024  3:24 PM    Family/Social/Home Environment:   Prior to admission, patient was living with her boyfriend, Raj. Patient. reported being independent at home. Patient was able to complete her ADLs, some meal prep, housekeeping, and driving. Raj manages the meal prep, errands and finances. Patient does not work. Patient's support includes Raj and her mother, Nighat. Patient does not have a primary care physician prior to admission. Patient prefers [facility]. Patient reports that she applied for Medicaid during her hospital stay. Patient is motivated to participate in therapy and return to her prior level of functioning.     Social/Functional History  Lives With: Other (comment)  Type of Home: Condo  Home Layout: One level  Home Access: Stairs to enter with rails  Entrance Stairs - Number of Steps: 2 flights of steps  Entrance Stairs - Rails: Left  Bathroom Shower/Tub: Walk-in shower  Bathroom Toilet: Standard  Bathroom Equipment: None  Bathroom Accessibility: Walker accessible  Home Equipment: None  Has the patient had two or more falls in the past year or any fall with injury in the past year?: No  Receives Help From: Family  ADL Assistance: Independent  Homemaking Assistance: Independent  Ambulation Assistance: Independent  Transfer Assistance: Independent  Active : Yes  Mode of Transportation: SUV  Type of Occupation: CNA  Leisure & Hobbies: Enjoys night walks, going out to dinner with family, listening to music, exploring new places  IADL Comments: Pt responsible for cleaning, laundry, and some cooking. Pt's  boyfriend helps with cooking and takes care of finances, and shopping.  Additional Comments: Patient reports she was independent prior to admission. Patient reports her boyfriend works during the day and reports his shop is under the condo. Patient reports she really does not have any other support.    Contact/Guardian Information: Raj Malcolm (boyfriend) [phone], Nighat Quiroz (parent) [phone]    Community Resources Utilized: Patient was not using community resources prior to admission.    Sexuality/Intimacy: Patient did not disclose sexuality/intimacy concerns.    Complementary Health Approaches: Patient did not disclose desires towards complementary health approaches.     Anticipated Needs/Discharge Plans: SW will follow and maintain involvement in discharge planning.     SW met with patient on this date to introduce self, complete SW assessment and initiate discharge planning. Prior to admission, patient was living with her boyfriend, Raj. Patient. reported being independent at home. Patient was able to complete her ADLs, some meal prep, housekeeping, and driving. Raj manages the meal prep, errands and finances. Patient does not work. Patient's support includes Raj and her mother, Nighat. Patient does not have a primary care physician prior to admission. Patient prefers [facility]. Patient reports that she applied for Medicaid during her hospital stay. Patient is motivated to participate in therapy and return to her prior level of functioning. SW educated patient on Thursday, 11/7 team conference. SW will follow and maintain involvement in discharge planning.        Read-Only, Retired: Discharge Planning  Living Arrangements: Spouse/Significant Other  Support Systems: Spouse/Significant Other  Potential Assistance Needed: Durable Medical Equipment  Type of Home Care Services: Aide Services, OT, PT  Patient expects to be discharged to:: House  Expected Discharge Date:   (Undetermined)      ADAM Pugh 11/6/2024 2:23 PM

## 2024-11-06 NOTE — PROGRESS NOTES
Spiritual Health History and Assessment/Progress Note  [facility]    (P) Follow-up,  ,  ,      Name: Linda Don MRN: [id number]    Age: 34 y.o.     Sex: female   Language: English   Taoism: Yarsani   Multiple fractures of pelvis with stable disruption of pelvic ring, initial encounter for closed fracture (HCC)     Date: 11/6/2024            Total Time Calculated: (P) 14 min              Spiritual Assessment continued in [facility]        Referral/Consult From: (P) Rounding   Encounter Overview/Reason: (P) Follow-up  Service Provided For: (P) Patient    Gloria, Belief, Meaning:   Patient Other: Has no gloria connection at this time  Family/Friends No family/friends present      Importance and Influence:  Patient unable to assess at this time  Family/Friends No family/friends present    Community:  Patient feels well-supported. Support system includes: Parent/s and Extended family  Family/Friends No family/friends present    Assessment and Plan of Care:     Patient Interventions include: Facilitated expression of thoughts and feelings  Family/Friends Interventions include: No family/friends present    Patient Plan of Care: Spiritual Care available upon further referral  Family/Friends Plan of Care: No family/friends present    Electronically signed by NITISH Olson on 11/6/2024 at 12:29 PM

## 2024-11-06 NOTE — PLAN OF CARE
Problem: Discharge Planning  Goal: Discharge to home or other facility with appropriate resources  11/6/2024 1404 by Misa Millan LPN  Outcome: Progressing  Flowsheets (Taken 11/6/2024 1404)  Discharge to home or other facility with appropriate resources: Identify barriers to discharge with patient and caregiver     Problem: Pain  Goal: Verbalizes/displays adequate comfort level or baseline comfort level  11/6/2024 1404 by Misa Millan LPN  Outcome: Progressing  Flowsheets (Taken 11/6/2024 1404)  Verbalizes/displays adequate comfort level or baseline comfort level:   Encourage patient to monitor pain and request assistance   Assess pain using appropriate pain scale   Administer analgesics based on type and severity of pain and evaluate response   Implement non-pharmacological measures as appropriate and evaluate response     Problem: Skin/Tissue Integrity  Goal: Absence of new skin breakdown  Description: 1.  Monitor for areas of redness and/or skin breakdown  2.  Assess vascular access sites hourly  3.  Every 4-6 hours minimum:  Change oxygen saturation probe site  4.  Every 4-6 hours:  If on nasal continuous positive airway pressure, respiratory therapy assess nares and determine need for appliance change or resting period.  11/6/2024 1404 by Misa Millan LPN  Outcome: Progressing  Note: No new skin breakdown.      Problem: Respiratory - Adult  Goal: Achieves optimal ventilation and oxygenation  11/6/2024 1404 by Misa Millan LPN  Outcome: Progressing  Flowsheets (Taken 11/6/2024 1404)  Achieves optimal ventilation and oxygenation: Assess for changes in respiratory status     Problem: Cardiovascular - Adult  Goal: Maintains optimal cardiac output and hemodynamic stability  11/6/2024 1404 by Misa Millan LPN  Outcome: Progressing  Flowsheets (Taken 11/6/2024 1404)  Maintains optimal cardiac output and hemodynamic stability: Monitor blood pressure and heart rate    to baseline bowel function:   Assess bowel function   Encourage oral fluids to ensure adequate hydration     Problem: Genitourinary - Adult  Goal: Absence of urinary retention  11/6/2024 1404 by Misa Millan LPN  Outcome: Progressing  Flowsheets (Taken 11/6/2024 1404)  Absence of urinary retention: Assess patient’s ability to void and empty bladder     Problem: Infection - Adult  Goal: Absence of infection at discharge  11/6/2024 1404 by Misa Millan LPN  Outcome: Progressing  Flowsheets (Taken 11/6/2024 1404)  Absence of infection at discharge: Assess and monitor for signs and symptoms of infection     Problem: Infection - Adult  Goal: Absence of infection during hospitalization  11/6/2024 1404 by Misa Millan LPN  Outcome: Progressing  Flowsheets (Taken 11/6/2024 1404)  Absence of infection during hospitalization: Assess and monitor for signs and symptoms of infection     Problem: Safety - Adult  Goal: Free from fall injury  11/6/2024 1404 by Misa Millan LPN  Outcome: Progressing  Flowsheets (Taken 11/6/2024 1404)  Free From Fall Injury: Instruct family/caregiver on patient safety

## 2024-11-06 NOTE — PROGRESS NOTES
This Pre Admission Screen is a refiled document from the acute stay. The Pre Admission was completed and signed on 2024 at 1:21 by Dr. Ignacio Mota.      [facility]  Acute Inpatient Rehab Preadmission Assessment     Patient Name: Linda Don        Ethnicity:Not of , /a, or Turkish origin  Race:Black or   MRN:   [id number]    : 1990  (34 y.o.)  Gender: female      Admitted from:[facility]  Initial Assessment     Date of admission to the hospital: 2024  8:29 AM  Date patient eligible for admission:2024     Primary Diagnosis: Multiple fractures of pelvis and mild TBI with loss of consciousness.      Did patient have surgery?  no     Physicians: Christoph Ley DO, Dr. Mota, Dr. Kovacs      Risk for clinical complications/co-morbidities:   Past Medical History   History reviewed. No pertinent past medical history.        Financial Information  Primary insurance:  Pending Medicaid      Secondary Insurance:   None     Has the patient had two or more falls in the past year or any fall with injury in the past year?   no     Did the patient have major surgery during the 100 days prior to admission?  yes     Precautions:      Restrictions/Precautions: Fall Risk, Weight Bearing  Other position/activity restrictions: Per Orth orders via Shir[place], WBAT BLE   Right Lower Extremity Weight Bearing: Weight Bearing As Tolerated  Left Lower Extremity Weight Bearing: Weight Bearing As Tolerated       Isolation Precautions: None                  Physiatrist: Dr. Archibald     Patients Occupation: Unemployed, seeking work  Reviewed Lab and Diagnostic reports from Current Admission: Yes     Patients Prior Functional  Level: Prior Function  ADL Assistance: Independent  Homemaking Assistance: Independent  Ambulation Assistance: Independent  Transfer Assistance: Independent  Additional Comments: Pt's boyfriend works during the day--his shop is under  Transfers: Modified independence     Expected level of Improvement in Locomotion:  Modified independence     Expected level of Improvement in Communication and Social Cognition: Modified independence     Expected length of time to achieve that level of improvement: 1 week     Current rehab issues: ADL dysfunction,bladder management,bowel management,carry over of therapy techniques, discharge planning, disease and co-morbidity management, gait/mobility dysfunction, medication management, nutrition and hydration management,Ongoing assessment of safety, Pain management, Patient and family education, Prevention of secondary complications, Skin Integrity, NWB,TTWB, PWB,cognitive impairment, communication impairment.     Required therapy: Physical Therapy, Occupational Therapy and Speech Therapy 3 hours per day, 5-6 days per week.  Recreational Therapy 1 hour per week.     Expected Discharge Destination: Home     Expected Post Discharge Treatments: Out Patient     Other information relevant to the care needs:   Lives With: Other (comment) (boyfriend)  Type of Home: [facility]  Home Layout: One level  Home Access: Stairs to enter with rails  Entrance Stairs - Number of Steps: 2 flights of steps  Entrance Stairs - Rails: Left  Bathroom Shower/Tub: Walk-in shower  Bathroom Toilet: Standard  Has the patient had two or more falls in the past year or any fall with injury in the past year?: No  ADL Assistance: Independent  Homemaking Assistance: Independent  Ambulation Assistance: Independent  Transfer Assistance: Independent  Active : Yes  Additional Comments: 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  Patient verbalized understanding.      Patient requires an intensive and coordinate interdisciplinary team approach to the delivery of rehabilitation care including PT/OT/ST and 24 hour nursing care and physician supervision to safely

## 2024-11-06 NOTE — PROGRESS NOTES
[facility] Contin[facility] Nurse  Progress Note       Linda Don  AGE: 34 y.o.   GENDER: female  : 1990  UNIT: 8K-14/014-A  TODAY'S DATE:  2024  ADMISSION DATE: 2024  3:24 PM    Subjective:       Linda Don is a 34 y.o. female referred by:   [] Physician/ Resident/ PA/ DELPHINE-CNP  [x] Nursing  [] Other:     Summary:      Wound ostomy consult received for     bilateral elbows, bilateral knees, right ankle   . Documentation and photos reviewed. Pt has traumatic wounds from fall down stairs. Recommend staff to utilize [facility] and Wound Care order sets. Can use bordered foam dressings or cuticerin with rolled gauze. See below. If wound evolves during hospital admission, please call.     How to: Wound Care Orders         Type WOUND in order set search bar.  Right click Wound Care Orders, select add to favorites.    These are independent nursing orders.

## 2024-11-06 NOTE — PLAN OF CARE
Problem: Discharge Planning  Goal: Discharge to home or other facility with appropriate resources  Outcome: Progressing  Flowsheets (Taken 11/5/2024 1930)  Discharge to home or other facility with appropriate resources: Identify barriers to discharge with patient and caregiver     Problem: Pain  Goal: Verbalizes/displays adequate comfort level or baseline comfort level  Outcome: Progressing  Flowsheets (Taken 11/5/2024 1930)  Verbalizes/displays adequate comfort level or baseline comfort level: Encourage patient to monitor pain and request assistance     Problem: Skin/Tissue Integrity  Goal: Absence of new skin breakdown  Description: 1.  Monitor for areas of redness and/or skin breakdown  2.  Assess vascular access sites hourly  3.  Every 4-6 hours minimum:  Change oxygen saturation probe site  4.  Every 4-6 hours:  If on nasal continuous positive airway pressure, respiratory therapy assess nares and determine need for appliance change or resting period.  Outcome: Progressing     Problem: Musculoskeletal - Adult  Goal: Return mobility to safest level of function  Outcome: Progressing  Flowsheets (Taken 11/5/2024 1930)  Return Mobility to Safest Level of Function: Assess patient stability and activity tolerance for standing, transferring and ambulating with or without assistive devices     Problem: Safety - Adult  Goal: Free from fall injury  Outcome: Progressing

## 2024-11-06 NOTE — PLAN OF CARE
Individualized Plan of Care  [facility] Inpatient Rehabilitation Unit    Rehabilitation physician: Dr. Mota    Admit Date: 11/5/2024     Rehabilitation Diagnosis: TBI (traumatic brain injury) [S06.9XAA]  Multiple fractures of pelvis with stable disruption of pelvic ring, initial encounter for closed fracture (HCC) [S32.810A]      Rehabilitation impairments: self care, mobility, motor dysfunction, and pain management    Factors facilitating achievement of predicted outcomes: Family support, Motivated, Cooperative, and Pleasant  Barriers to the achievement of predicted outcomes: Pain, Limited family support, Limited insight into deficits, Decreased endurance, Lower extremity weakness, Long standing deficits, Medical complications, Stairs at home, Skin Care, and Medication managment    Patient Goals: Improve independence with mobility, Increase overall strength and endurance, Increase balance, Increase endurance, Increase independence with activities of daily living, Increase self-awareness, Increase safety awareness, Integrate appropriate pain management plan, Assure adequate nutritional option for discharge, Continence of bowel and bladder, and Provide appropriate patient and family education      NURSING:  Nursing goals for Linda Don while on the rehabilitation unit will include:  Continence of bowel and bladder, Adequate number of bowel movements, Urinate with no urinary retention >300ml in bladder, Maintain O2 SATs at an acceptable level during stay, Effective pain management while on the rehabilitation unit, Establish adequate pain control plan for discharge, Absence of skin breakdown while on the rehabilitation unit, Avoidance of any hospital acquired infections, Freedom from injury during hospitalization, and Complete education with patient/family with understanding demonstrated regarding disease process and resultant impairment     In order to achieve these goals, nursing interventions may  training.      SPEECH THERAPY:        No Speech Therapy Services required    CASE MANAGEMENT:  Goals:   Assist patient/family with discharge planning, patient/family counseling,  and coordination with insurance during the inpatient rehabilitation stay.         Other members of the multidisciplinary rehabilitation team that will be involved in the patient's plan of care include recreational therapy, dietary, respiratory therapy, and neuropsychology.    Medical issues being managed closely and that require 24 hour availability of a physician:  Weight bearing precautions, Pain management, Infection protection, DVT prophylaxis, Fall precautions, Fluid/Electrolyte balance, Nutritional status, and Anemia                                           Physician anticipated functional outcomes: Improved independence with functional measures   Estimated length of stay for this admission 2-3 weeks  Medical Prognosis: Good  Anticipated disposition: Home.      The potential to achieve the above medical and rehabilitative goals is very good.    This plan of care has been developed with the assistance and input of the multidisciplinary rehabilitation team.  The plan was reviewed with the patient.  The patient has had the opportunity to provide input to the therapy team.    I have reviewed this Individualized Plan of Care and agree with its contents.  Above documentation has been expanded, modified, adjusted to reflect the findings of my evaluations and goals for the patient.    Physician:  ESTELA KISER MD

## 2024-11-06 NOTE — PROGRESS NOTES
[facility]  Recreational Therapy  Inpatient Rehabilitation Evaluation        Time Spent with Patient: 24 minutes    Date:  11/6/2024       Patient Name: Linda Don      MRN: [id number]       YOB: 1990 (34 y.o.)       Gender: female          RESTRICTIONS/PRECAUTIONS:  Restrictions/Precautions: General Precautions, Fall Risk     Hearing: Within functional limits    PAIN: 5    SUBJECTIVE:  pt lives with her boyfriend and her 6 yr old son    VISION:  Visual Impairment-her glasses are broke at this time-she can see her schedule close up but has difficulty with distance/seeing anything written on her board in room    HEARING: Within Normal Limit    LEISURE INTERESTS:   Pt states she enjoys word search puzzles, likes to play games on her phone, likes the outdoors, going out to eat, her and boyfriend like to take drives, she likes to read and gave her a deck of cards and some adult coloring materials for in room use-pt very pleasant and appreciative     BARRIERS TO LEISURE INTERESTS:    Decreased endurance, Pain, and Upper extremity weakness           Patient Education  New Education Provided: Importance of Leisure, RT Plan of Care    Plan:  Continue to follow patient through this admission  Include patient in appropriate groups  See patient individually    Electronically signed by: Marilia Gallegos CTRS  Date: 11/6/2024

## 2024-11-06 NOTE — PROGRESS NOTES
[facility]  Individualized Disclosure Statement      Patient: Linda Don      Scope of Service  [facility] provides 24 hour individualized service to patients with functional limitations due to, but not limited to: stroke, brain injury, spinal cord injury, major multiple trauma, fractures, amputation, and neurological disorders. The Rehabilitation Center provides rehabilitative nursing and medical services as well as physical, occupational, speech, and recreation therapies.  [facility] is fully accredited by the Commission on Accreditation of Rehabilitation Facilities (CARF) as a comprehensive provider of rehabilitation services.  Patients admitted to the [facility] receive a minimum of three hours of therapy per day, at least five days per week, with a revised therapy schedule on weekends and holidays.  Physical therapy, occupational therapy, and speech therapy are provided seven days per week including holidays.  Other therapeutic services are available on weekends and evenings as needed or scheduled.  Intensity of Treatment  Your treatment program will consist of Nursing Care and:  1.5 hours of Physical Therapy, per day  1.5 hours of Occupational Therapy, per day   30-60 minutes of Speech Therapy, per day  1 hour of Recreational Therapy, per week  Depending on your needs, the exact time spent with each of the above disciplines may fluctuate, however you will receive at least 3 hours of therapy at least 5 days per week.    [facility] maintains contracts with most insurance plans.  Depending on the type of coverage, the insurance may impose limits on the coverage for rehabilitation care.  Coverage is based on the premise that you are able to fully participate in the rehabilitation program and show continued progress. Please verify your own insurance information. A

## 2024-11-06 NOTE — PROGRESS NOTES
Patient educated on how to use incentive spirometer. Emphasized the importance to use it every 4 hours while awake.

## 2024-11-06 NOTE — PROGRESS NOTES
[facility]  INPATIENT OCCUPATIONAL THERAPY  [facility]  EVALUATION      Discharge Recommendations: Continue to assess pending progress, Patient would benefit from continued therapy after discharge, Outpatient OT  Equipment Recommendations:   Pt does not own any equiptment at this time; recommend shower chair and grab bars around toilet and in shower      Time In: 1030  Time Out: 1200  Timed Code Treatment Minutes: 75 Minutes  Minutes: 90          Date: 2024  Patient Name: Linda Don,   Gender: female      MRN: [id number]  : 1990  (34 y.o.)  Referring Practitioner: Ignacio Mota MD  Diagnosis: Multiple fractures of pelvis with stable disruption of pelvic ring, initial encounter for closed fracture (HCC)  Additional Pertinent Hx: Linda Don is a 34 y.o. right-handed -American female with no significant past medical history, was admitted on 2024 for intensive inpatient management of impairment & disability secondary to injuries sustained in a fall accident on the stair at home on 2024. The patient says she stayed over the night in a casino and returned home on early 2024 morning. While she was walking up the stairs to reach her condo apartment on the second floor, her foot slipped causing her to lose balance because of slippery wooden floor with presence of her sock.  She then fell down to the bottom of the stair (approximately 12 steps).  She says her forehead hit something and she blacked out briefly.  Initially she feels severe pain at her lower back, right knee and right ankle.  When she tried to get up using her left lower extremity, she felt severe pain at her left groin.  Her crying and screaming woke her boyfriend up.  After she calmed down, she asked her boyfriend to bring her to emergency room.  Therefore her boyfriend brought her to [facility] ER for evaluation.  CT of abdomen and pelvis, and lumbar spine were

## 2024-11-06 NOTE — PROGRESS NOTES
[facility]  SPEECH THERAPY  [facility]  Speech - Language - Cognitive Evaluation    Discharge Recommendations: Home with Home Exercise Program    SLP Individual Minutes  Time In: 0900  Time Out: 0920  Minutes: 20  Timed Code Treatment Minutes: 0 Minutes       Date: 2024  Patient Name: Linda Don      CSN: [id number]   : 1990  (34 y.o.)  Gender: female   Referring Physician:  Ignacio Mota MD   Diagnosis: Multiple fractures of pelvis with stable disruption of pelvic ring, initial encounter for closed fracture (HCC)  Precautions: fall risk  History of Present Illness/Injury: Patient admitted to [facility] with above diagnosis: see physician H&P for further information. Per chart review, \"Linda Don is a 34 y.o. right-handed -American female with no significant past medical history, is admitted to the inpatient rehabilitation unit on 2024 for intensive inpatient rehabilitation treatment of impairment and disability secondary to injuries sustained in a fall accident on the stairs at home on 2024.     The patient says she stayed over the night in a casino and returned home on early 2024 morning.  While she was walking up the stairs to reach her condo apartment on the second floor, her foot slipped causing her to lose balance because of slippery wooden floor with presence of her sock.  She then fell down to the bottom of the stair (approximately 12 steps).  She says her forehead hit something and she blacked out briefly.  Initially she feels severe pain at her lower back, right knee and right ankle.  When she tried to get up using her left lower extremity, she felt severe pain at her left groin.  Her crying and screaming woke her boyfriend up.  After she calmed down, she asked her boyfriend to bring her to emergency room.  Therefore her boyfriend brought her to [facility] ER for evaluation.  CT of abdomen and pelvis, and lumbar  spine were performed at [facility] ER.  She was found to have significant pelvis fracture.  The patient then was transferred to [facility] for further care.  Images of abdomen/pelvis and the lumbar spine CT from [facility] were reviewed by radiologist.  She was found to have nondisplaced right iliac bone fracture superiorly, posteriorly and medially, nondisplaced left superior pubic rami's comminuted fracture, and nondisplaced acute left inferior pubic ramus fracture.  Because of multiple areas of skin abrasion wounds, additional x-ray of chest, left femur, right knee, left tibia/fibula, left wrist were done at [facility] on 11/2/2024 and showed no fracture or joint dislocation other than right knee soft tissue swelling.  The patient was hospitalized.  She was evaluated by orthopedic service.  Nonoperative conservative treatment was recommended.  The patient was initially placed on right lower extremity nonweightbearing restriction but was allowed weightbearing as tolerated on left lower extremity.  However orthopedic service later allowed patient to have weightbearing as tolerated on bilateral lower extremities with usage of walker on 11/4/2024.      The patient continues having pain at her left groin and right side low back area which is described as a sharp stabbing sensation.  She rates the pain intensity at 6/10 level.  She says her right knee and right ankle remain painful.  Her right elbow is no longer painful.  She had bowel movement yesterday.  She now has no problem voiding.  Her lower extremities remain weak.  She also still feels fatigued.  She denies having headache, dizziness, lightheadedness, visual disturbance, shortness of breath, chest pain, abdominal pain, nausea vomiting, diarrhea or constipation, or numbness tingling feeling.\"    No past medical history on file.    Pain: Did not rate pain; however, reports recently receiving pain

## 2024-11-06 NOTE — PROGRESS NOTES
Physical Medicine & Rehabilitation Progress Note    Chief Complaint:  Fall on stairs resulting pain at lower back and left groin     Subjective:    Linda Don is a 34 y.o. right-handed -American female with no significant past medical history, was admitted on 11/5/2024 for intensive inpatient management of impairment & disability secondary to injuries sustained in a fall accident on the stair at home on 11/2/2024.     The patient says she stayed over the night in a casino and returned home on early 11/2/2024 morning.  While she was walking up the stairs to reach her condo apartment on the second floor, her foot slipped causing her to lose balance because of slippery wooden floor with presence of her sock.  She then fell down to the bottom of the stair (approximately 12 steps).  She says her forehead hit something and she blacked out briefly.  Initially she feels severe pain at her lower back, right knee and right ankle.  When she tried to get up using her left lower extremity, she felt severe pain at her left groin.  Her crying and screaming woke her boyfriend up.  After she calmed down, she asked her boyfriend to bring her to emergency room.  Therefore her boyfriend brought her to [facility] ER for evaluation.  CT of abdomen and pelvis, and lumbar spine were performed at [facility] ER.  She was found to have significant pelvis fracture.  The patient then was transferred to [facility] for further care.  Images of abdomen/pelvis and the lumbar spine CT from [facility] were reviewed by radiologist.  She was found to have nondisplaced right iliac bone fracture superiorly, posteriorly and medially, nondisplaced left superior pubic rami's comminuted fracture, and nondisplaced acute left inferior pubic ramus fracture.  Because of multiple areas of skin abrasion wounds, additional x-ray of chest, left femur, right knee, left tibia/fibula, left wrist were done at [facility]  take narcotic pain medication.  She still has limited lower extremities ROM and reduced muscle strength limited by pain.  Her urinary analysis done last night revealed presence of nitrite and large leukocyte esterase suggesting of urinary tract infection.  Reflexed urine culture is in the process.  Her WBC remains elevated.  I will start patient on 5 days course Macrobid for empirical treatment of UTI.  She tolerated the first day of rehabilitation treatment well yesterday.    The patient currently is projected to be ready for discharge on 11/16/2024.      Plan:  Continues intensive PT/OT/SLP/RT inpatient rehabilitation program at least 3 hours per day, 5 days per week in order to improve functional status prior to discharge.  Family education and training will be completed.  Equipment evaluations and recommendations will be completed as appropriate.       Rehabilitation nursing continue to be involved for bowel, bladder, skin, and pain management.  Nursing will also provide education and training to patient and family.    Prophylaxis:  DVT: Lovenox, PRIMITIVO stocking, intermittent pneumatic compression device.  GI: Colace, Senokot, Movantik, GlycoLax as needed, milk of magnesium as needed, Dulcolax suppository as needed, Zofran as needed  Pain: Tylenol, Tylenol as needed, Flexeril as needed, oxycodone 5 to 10 mg as needed; add Voltaren gel as needed  Continue Neosporin ointment application to abrasion wound  Continue Protonix for gastric protection  Continue multivitamin for anemia  Start 5 days course Macrobid for empirical treatment of urinary tract infection  Continue melatonin, trazodone as needed for insomnia  Wound care nurse consultation for open outpatient wound management  Nutrition: Continue regular diet.    Bladder: Monitoring signs or symptoms of UTI  Bowel: Monitoring signs or symptoms of constipation   and case management for coordination of care and discharge planning      Missed Therapy

## 2024-11-07 PROBLEM — N30.00 ACUTE CYSTITIS WITHOUT HEMATURIA: Status: ACTIVE | Noted: 2024-11-07

## 2024-11-07 LAB
BASOPHILS ABSOLUTE: 0 THOU/MM3 (ref 0–0.1)
BASOPHILS NFR BLD AUTO: 0.2 %
DEPRECATED RDW RBC AUTO: 51.9 FL (ref 35–45)
EOSINOPHIL NFR BLD AUTO: 3.7 %
EOSINOPHILS ABSOLUTE: 0.4 THOU/MM3 (ref 0–0.4)
ERYTHROCYTE [DISTWIDTH] IN BLOOD BY AUTOMATED COUNT: 15 % (ref 11.5–14.5)
HCT VFR BLD AUTO: 30.7 % (ref 37–47)
HGB BLD-MCNC: 9.6 GM/DL (ref 12–16)
IMM GRANULOCYTES # BLD AUTO: 0.09 THOU/MM3 (ref 0–0.07)
IMM GRANULOCYTES NFR BLD AUTO: 0.8 %
LYMPHOCYTES ABSOLUTE: 1.9 THOU/MM3 (ref 1–4.8)
LYMPHOCYTES NFR BLD AUTO: 16.7 %
MCH RBC QN AUTO: 29.4 PG (ref 26–33)
MCHC RBC AUTO-ENTMCNC: 31.3 GM/DL (ref 32.2–35.5)
MCV RBC AUTO: 94.2 FL (ref 81–99)
MONOCYTES ABSOLUTE: 1.2 THOU/MM3 (ref 0.4–1.3)
MONOCYTES NFR BLD AUTO: 10.3 %
NEUTROPHILS ABSOLUTE: 7.8 THOU/MM3 (ref 1.8–7.7)
NEUTROPHILS NFR BLD AUTO: 68.3 %
NRBC BLD AUTO-RTO: 0 /100 WBC
PLATELET # BLD AUTO: 285 THOU/MM3 (ref 130–400)
PMV BLD AUTO: 11.1 FL (ref 9.4–12.4)
RBC # BLD AUTO: 3.26 MILL/MM3 (ref 4.2–5.4)
WBC # BLD AUTO: 11.4 THOU/MM3 (ref 4.8–10.8)

## 2024-11-07 PROCEDURE — 2580000003 HC RX 258: Performed by: PHYSICAL MEDICINE & REHABILITATION

## 2024-11-07 PROCEDURE — 97110 THERAPEUTIC EXERCISES: CPT

## 2024-11-07 PROCEDURE — 97116 GAIT TRAINING THERAPY: CPT

## 2024-11-07 PROCEDURE — 1180000000 HC REHAB R&B

## 2024-11-07 PROCEDURE — 97112 NEUROMUSCULAR REEDUCATION: CPT

## 2024-11-07 PROCEDURE — 6370000000 HC RX 637 (ALT 250 FOR IP): Performed by: PHYSICAL MEDICINE & REHABILITATION

## 2024-11-07 PROCEDURE — 85025 COMPLETE CBC W/AUTO DIFF WBC: CPT

## 2024-11-07 PROCEDURE — 6360000002 HC RX W HCPCS: Performed by: PHYSICAL MEDICINE & REHABILITATION

## 2024-11-07 PROCEDURE — 97535 SELF CARE MNGMENT TRAINING: CPT

## 2024-11-07 PROCEDURE — 97530 THERAPEUTIC ACTIVITIES: CPT

## 2024-11-07 PROCEDURE — 36415 COLL VENOUS BLD VENIPUNCTURE: CPT

## 2024-11-07 PROCEDURE — 99232 SBSQ HOSP IP/OBS MODERATE 35: CPT | Performed by: PHYSICAL MEDICINE & REHABILITATION

## 2024-11-07 PROCEDURE — 6370000000 HC RX 637 (ALT 250 FOR IP): Performed by: FAMILY MEDICINE

## 2024-11-07 RX ORDER — NITROFURANTOIN 25; 75 MG/1; MG/1
100 CAPSULE ORAL EVERY 12 HOURS SCHEDULED
Status: DISCONTINUED | OUTPATIENT
Start: 2024-11-07 | End: 2024-11-08

## 2024-11-07 RX ADMIN — SENNOSIDES 8.6 MG: 8.6 TABLET, FILM COATED ORAL at 19:49

## 2024-11-07 RX ADMIN — DICLOFENAC SODIUM 2 G: 10 GEL TOPICAL at 13:52

## 2024-11-07 RX ADMIN — NITROFURANTOIN MONOHYDRATE/MACROCRYSTALS 100 MG: 75; 25 CAPSULE ORAL at 08:49

## 2024-11-07 RX ADMIN — Medication 1 TABLET: at 08:49

## 2024-11-07 RX ADMIN — OXYCODONE 10 MG: 5 TABLET ORAL at 06:36

## 2024-11-07 RX ADMIN — DICLOFENAC SODIUM 2 G: 10 GEL TOPICAL at 17:19

## 2024-11-07 RX ADMIN — DOCUSATE SODIUM 100 MG: 100 CAPSULE, LIQUID FILLED ORAL at 19:49

## 2024-11-07 RX ADMIN — ACETAMINOPHEN 650 MG: 325 TABLET ORAL at 17:19

## 2024-11-07 RX ADMIN — POLYETHYLENE GLYCOL 3350 17 G: 17 POWDER, FOR SOLUTION ORAL at 19:49

## 2024-11-07 RX ADMIN — DICLOFENAC SODIUM 2 G: 10 GEL TOPICAL at 08:50

## 2024-11-07 RX ADMIN — Medication 12.5 MG: at 05:29

## 2024-11-07 RX ADMIN — FERROUS SULFATE TAB 325 MG (65 MG ELEMENTAL FE) 325 MG: 325 (65 FE) TAB at 08:49

## 2024-11-07 RX ADMIN — CALCIUM 500 MG: 500 TABLET ORAL at 08:49

## 2024-11-07 RX ADMIN — ENOXAPARIN SODIUM 40 MG: 100 INJECTION SUBCUTANEOUS at 08:49

## 2024-11-07 RX ADMIN — PANTOPRAZOLE SODIUM 40 MG: 40 TABLET, DELAYED RELEASE ORAL at 05:29

## 2024-11-07 RX ADMIN — FERROUS SULFATE TAB 325 MG (65 MG ELEMENTAL FE) 325 MG: 325 (65 FE) TAB at 17:19

## 2024-11-07 RX ADMIN — CALCIUM 500 MG: 500 TABLET ORAL at 17:19

## 2024-11-07 RX ADMIN — ACETAMINOPHEN 650 MG: 325 TABLET ORAL at 05:29

## 2024-11-07 RX ADMIN — SODIUM CHLORIDE, PRESERVATIVE FREE 10 ML: 5 INJECTION INTRAVENOUS at 08:50

## 2024-11-07 RX ADMIN — DOCUSATE SODIUM 100 MG: 100 CAPSULE, LIQUID FILLED ORAL at 08:49

## 2024-11-07 RX ADMIN — NITROFURANTOIN MONOHYDRATE/MACROCRYSTALS 100 MG: 75; 25 CAPSULE ORAL at 19:49

## 2024-11-07 RX ADMIN — Medication 6 MG: at 19:49

## 2024-11-07 RX ADMIN — SODIUM CHLORIDE, PRESERVATIVE FREE 10 ML: 5 INJECTION INTRAVENOUS at 19:49

## 2024-11-07 RX ADMIN — OXYCODONE 10 MG: 5 TABLET ORAL at 13:48

## 2024-11-07 RX ADMIN — ACETAMINOPHEN 650 MG: 325 TABLET ORAL at 10:20

## 2024-11-07 ASSESSMENT — PAIN DESCRIPTION - ORIENTATION
ORIENTATION: LEFT;LOWER
ORIENTATION: LEFT
ORIENTATION: LOWER
ORIENTATION: RIGHT;LEFT;LOWER
ORIENTATION: LEFT

## 2024-11-07 ASSESSMENT — PAIN - FUNCTIONAL ASSESSMENT: PAIN_FUNCTIONAL_ASSESSMENT: ACTIVITIES ARE NOT PREVENTED

## 2024-11-07 ASSESSMENT — PAIN DESCRIPTION - LOCATION
LOCATION: BACK
LOCATION: GROIN;BACK
LOCATION: BACK;LEG
LOCATION: GROIN
LOCATION: GROIN

## 2024-11-07 ASSESSMENT — PAIN SCALES - GENERAL
PAINLEVEL_OUTOF10: 8
PAINLEVEL_OUTOF10: 2
PAINLEVEL_OUTOF10: 5
PAINLEVEL_OUTOF10: 8
PAINLEVEL_OUTOF10: 8
PAINLEVEL_OUTOF10: 9

## 2024-11-07 ASSESSMENT — PAIN DESCRIPTION - DESCRIPTORS
DESCRIPTORS: ACHING
DESCRIPTORS: ACHING;SHARP
DESCRIPTORS: ACHING

## 2024-11-07 NOTE — PROGRESS NOTES
RECREATIONAL THERAPY  [facility]      Date:  11/7/2024            Patient Name: Linda Don           MRN: [id number]  Acct: [id number]          YOB: 1990 (34 y.o.)       Gender: female      Physician: Referring Practitioner: Ignacio Mota MD    REASON FOR MISSED TREATMENT:  Pt not interested in coming to the [place] pod to do crafts-requesting to go to bed to rest     Marilia Gallegos, CTRS    11/7/2024

## 2024-11-07 NOTE — PROGRESS NOTES
[facility]  Occupational Therapy  Daily Note    Discharge Recommendations: Continue to assess pending progress and Patient would benefit from continued OT at discharge  Equipment Recommendations:   Pt does not own any equiptment at this time; recommend shower chair and grab bars around toilet and in shower      Time In: 0700  Time Out: 0830  Timed Code Treatment Minutes: 90 Minutes  Minutes: 90          Date: 2024  Patient Name: Linda Don,   Gender: female      [facility]  MRN: [id number]  : 1990  (34 y.o.)  Referring Practitioner: Ignacio Mota MD  Diagnosis: Multiple fractures of pelvis with stable disruption of pelvic ring, initial encounter for closed fracture (HCC)  Additional Pertinent Hx: Linda Don is a 34 y.o. right-handed -American female with no significant past medical history, was admitted on 2024 for intensive inpatient management of impairment & disability secondary to injuries sustained in a fall accident on the stair at home on 2024. The patient says she stayed over the night in a casino and returned home on early 2024 morning. While she was walking up the stairs to reach her condo apartment on the second floor, her foot slipped causing her to lose balance because of slippery wooden floor with presence of her sock.  She then fell down to the bottom of the stair (approximately 12 steps).  She says her forehead hit something and she blacked out briefly.  Initially she feels severe pain at her lower back, right knee and right ankle.  When she tried to get up using her left lower extremity, she felt severe pain at her left groin.  Her crying and screaming woke her boyfriend up.  After she calmed down, she asked her boyfriend to bring her to emergency room.  Therefore her boyfriend brought her to [facility] ER for evaluation.  CT of abdomen and pelvis, and lumbar spine were performed at  cleaning, laundry, and some cooking. Pt's boyfriend helps with cooking and takes care of finances, and shopping.    Receives Help From: Family  ADL Assistance: Independent  Homemaking Assistance: Independent  Ambulation Assistance: Independent  Transfer Assistance: Independent    Active : Yes  Mode of Transportation: SUV  Type of Occupation: CNA  Leisure & Hobbies: Enjoys night walks, going out to dinner with family, listening to music, exploring new places  Additional Comments: Patient reports she was independent prior to admission. Patient reports her boyfriend works during the day and reports his shop is under the condo. Patient reports she really does not have any other support.    SUBJECTIVE: Patient seated in bedside chair upon arrival. Agreeable to OT session     PAIN: Complains of pain in pelvis, R knee and lower back. Did not rate     Vitals: Vitals not assessed per clinical judgement, see nursing flowsheet    COGNITION: WFL    ADL:   Footwear Management: Modified Independent.  Using reacher to doff socks and sock aid to don B socks with increased time .    IADL:   Meal Preparation: Patient completed IADL homemaking task this date of scrambling eggs - task was graded to challenge balance, safety, problem solving, and activity tolerance.  Patient was able to retrieve all items from graded heights with CGA-SBA and no VCs for safety during the retrieval and transportation of items. Patient able to complete task demo'ing good safety awareness throughout. During task patient stood at sink to wash dishes with BUE release from walker requiring CGA-close SBA for balance with no LOB noted. Patient required 0 seated rest breaks throughout task with a standing endurance of 27 minutes. Patient required no verbal cues for cognitive aspect. Lengthy seated rest break required after task with min fatigue noted.      BALANCE:  Sitting Balance:  Modified Independent.    Standing Balance: Contact Guard Assistance. - SBA

## 2024-11-07 NOTE — PLAN OF CARE
Problem: Discharge Planning  Goal: Discharge to home or other facility with appropriate resources  11/7/2024 0036 by Christoph Mohr, RN  Outcome: Progressing  Flowsheets (Taken 11/6/2024 1930)  Discharge to home or other facility with appropriate resources: Identify barriers to discharge with patient and caregiver     Problem: Pain  Goal: Verbalizes/displays adequate comfort level or baseline comfort level  11/7/2024 0036 by Christoph Mohr, RN  Outcome: Progressing  Flowsheets (Taken 11/6/2024 1930)  Verbalizes/displays adequate comfort level or baseline comfort level: Encourage patient to monitor pain and request assistance     Problem: Skin/Tissue Integrity  Goal: Absence of new skin breakdown  Description: 1.  Monitor for areas of redness and/or skin breakdown  2.  Assess vascular access sites hourly  3.  Every 4-6 hours minimum:  Change oxygen saturation probe site  4.  Every 4-6 hours:  If on nasal continuous positive airway pressure, respiratory therapy assess nares and determine need for appliance change or resting period.  11/7/2024 0036 by Christoph Mohr, RN  Outcome: Progressing     Problem: Musculoskeletal - Adult  Goal: Return mobility to safest level of function  11/7/2024 0036 by Christoph Mohr, RN  Outcome: Progressing  Flowsheets (Taken 11/6/2024 1930)  Return Mobility to Safest Level of Function: Assess patient stability and activity tolerance for standing, transferring and ambulating with or without assistive devices     Problem: Safety - Adult  Goal: Free from fall injury  11/7/2024 0036 by Christoph Mohr, RN  Outcome: Progressing

## 2024-11-07 NOTE — PLAN OF CARE
Problem: Discharge Planning  Goal: Discharge to home or other facility with appropriate resources  11/7/2024 1124 by Autumn Quiros LISW-S  Note: Team conference held Thursday, 11/7. Recommendations of the team were explained to the patient by Dr Mota and SW. Team is recommending that patient continue on acute inpatient rehab for PT and OT, with expected discharge date of Saturday, 11/16. Following discharge, the team's recommendations for services are undetermined at this time. Care plan reviewed with patient. Patient verbalized understanding of the plan of care and contributed to goal setting. SW to follow and maintain involvement in discharge planning.

## 2024-11-07 NOTE — PROGRESS NOTES
takes care of finances, and shopping.  Additional Comments: Patient reports she was independent prior to admission. Patient reports her boyfriend works during the day and reports his shop is under the condo. Patient reports she really does not have any other support.    Restrictions/Precautions:  Restrictions/Precautions: General Precautions, Fall Risk  Right Lower Extremity Weight Bearing: Weight Bearing As Tolerated  Left Lower Extremity Weight Bearing: Weight Bearing As Tolerated  Position Activity Restriction  Other position/activity restrictions: Per Orth orders via Shirin, WBAT BLE 11/4     SUBJECTIVE: Patient seated in recliner upon arrival. Patient pleasant and motivated for therapy session. PT educated patient on walking program for increased mobility.     PAIN: Patient reporting complaints of pain in L groin but did not formally rate pain    Vitals: Vitals not assessed per clinical judgement, see nursing flowsheet    OBJECTIVE:  Bed Mobility:  Not Tested    Transfers:  Sit to Stand: Stand By Assistance  Stand to Sit:Stand By Assistance  *Patient instructed in sit<>stand transfers from various surfaces including: recliner and wheelchair with use of RW. Patient required increased time to complete.     Ambulation:  Stand By Assistance  Distance: 65 feet x 2, 30 feet and 10 feet   Surface: Level Tile  Device: Rolling Walker  Gait Deviations: Forward Flexed Posture, Slow Sonia, Decreased Step Length Bilaterally, Decreased Gait Speed, Decreased Heel Strike Bilaterally, Decreased Foot Clearance Right, Decreased Foot Clearance Left, and Increased reliance on assistive device   *Patient required cueing for posture, looking up to scan environment and for toe clearance    Stairs:  Stairs: 6\" steps X 4 using Bilateral Handrails and Stand By Assistance, Contact Guard Assistance.  *Patient required cueing for performing 1 step at a time with increased time required to complete.     Platform: 4\" platform X 2 using  Parallel Bars and Contact Guard Assistance. 6\" platform X 2 using Parallel Bars and Contact Guard Assistance.  *Patient required education on proper technique     Balance:  Dynamic Standing Balance: Stand By Assistance    Neuro Re-ed  Pt. Completed standing and Stepping activities using Bilateral UE support on Parallel Bars to improve proprioception, coordination, gait mechanics, hip/quad stability, and lateral weight shifting for improved functional mobility.   Patient instructed in forward stepping through ladder in parallel bars with BUE support. Patient required cueing for increased B hip and knee flexion for stepping through ladder. Patient instructed in stepping through ladder in order to assist with gait mechanics and weight shifting.   Patient instructed in lateral side stepping in parallel bars with SBA with cueing for picking up feet in order to assist with increased hip and knee flexion and weight shifting.       Exercise:  Patient was guided in 1 set(s) 15 reps of exercises to both lower extremities: Glut sets, Seated marches, Seated hamstring curls, Seated heel/toe raises, Long arc quads, Seated isometric hip adduction, Seated abduction/adduction. Exercises were completed for increased independence with functional mobility.      Functional Outcome Measures:  Not completed  Modified Wexford Scale:  Not Applicable    ASSESSMENT:  Assessment: Patient progressing toward established goals.  Activity Tolerance:  Patient tolerance of  treatment:Good.  Plan: Current Treatment Recommendations: Strengthening, Balance training, Functional mobility training, Transfer training, Neuromuscular re-education, Endurance training, Home exercise program, Equipment evaluation, education, & procurement, Safety education & training, Patient/Caregiver education & training, Therapeutic activities, Pain management, Gait training, Stair training  General Plan:  (5x/wk 90 min)    Education:  Learners: Patient  Patient Education:  Transfers, Gait, Stairs, Verbal Exercise Instruction,  - Patient Verbalized Understanding, - Patient Requires Continued Education    Goals:  Patient Goals : \"Walking, standing time and walking up/down steps without pain\"  Short Term Goals  Time Frame for Short Term Goals: 1 week  Short Term Goal 1: Patient will complete supine < > sit with SBA, head of bed flat, no rails to transfer in and out of bed with decreased difficulty.  Short Term Goal 2: Patient to perform sit<>stand transfers with use of RW and SBA in order to assist with safety with transfers in home.  Short Term Goal 3: Patient to ambulate >/=50 feet with use of RW and SBA in order to assist with ambulation in home.  Short Term Goal 4: Patient to ascend/descend 6 inch step in parallel bars with CGA in order to assist with progression to stair negotiation.  Short Term Goal 5: Patient to perform car transfer with CGA in order to assist with getting to and from appointments.  Long Term Goals  Time Frame for Long Term Goals : 2 weeks from IPR evaluation  Long Term Goal 1: Patient to perform bed mobility supine<>sit with Mod I in order to assist with getting into and out of bed.  Long Term Goal 2: Patient to perform sit<>stand transfers with use of RW and Mod I in order to assist with safety with transfers in home.  Long Term Goal 3: Patient to ambulate >/=150 feet with use of RW and Mod I in order to assist with home mobility.  Long Term Goal 4: Patient to ascend/descend 14 steps with L handrail and Supervision in order to assist with home entry.  Long Term Goal 5: Patient to perform car transfer with Mod I in order to assist with getting to and from appointments.    Following session, patient left in safe position with all fall risk precautions in place.

## 2024-11-07 NOTE — CONSULTS
Department of Family Practice  Consult Note    This patient was seen earlier in the day.    Reason for Consult:  Medical management while on the Inpatient Rehab unit.    Requesting Physician:  Dr Mota    CHIEF COMPLAINT:   The need to continue the intensive time with therapies following the acute hospital stay.    History Obtained From:  patient, EMR    HISTORY OF PRESENT ILLNESS:              The patient is a 34 y.o. female with significant past medical history of No past medical history on file.   who presents with having fallen down approx 12 stairs leading to numerous fractures throughout the pelvis. She did not required surgery. She now has come to the Inpatient Rehab Unit to continue the time with therapies prior to a discharge disposition being made.    Past Medical History:    No past medical history on file.  Past Surgical History:    No past surgical history on file.  Current Medications:   Current Facility-Administered Medications: diclofenac sodium (VOLTAREN) 1 % gel 2 g, 2 g, Topical, 4x daily  calcium elemental (OSCAL) tablet 500 mg, 500 mg, Oral, BID WC  ferrous sulfate (IRON 325) tablet 325 mg, 325 mg, Oral, BID WC  sodium chloride flush 0.9 % injection 5-40 mL, 5-40 mL, IntraVENous, 2 times per day  sodium chloride flush 0.9 % injection 5-40 mL, 5-40 mL, IntraVENous, PRN  0.9 % sodium chloride infusion, , IntraVENous, PRN  acetaminophen (TYLENOL) tablet 650 mg, 650 mg, Oral, Q6H  enoxaparin (LOVENOX) injection 40 mg, 40 mg, SubCUTAneous, Daily  oxyCODONE (ROXICODONE) immediate release tablet 5 mg, 5 mg, Oral, Q4H PRN **OR** oxyCODONE (ROXICODONE) immediate release tablet 10 mg, 10 mg, Oral, Q4H PRN  cyclobenzaprine (FLEXERIL) tablet 10 mg, 10 mg, Oral, TID PRN  pantoprazole (PROTONIX) tablet 40 mg, 40 mg, Oral, QAM AC  docusate sodium (COLACE) capsule 100 mg, 100 mg, Oral, BID  senna (SENOKOT) tablet 8.6 mg, 1 tablet, Oral, Nightly  bisacodyl (DULCOLAX) suppository 10 mg, 10 mg, Rectal, Daily  PRN  magnesium hydroxide (MILK OF MAGNESIA) 400 MG/5ML suspension 15 mL, 15 mL, Oral, Daily PRN  naloxegol (MOVANTIK) tablet 12.5 mg, 12.5 mg, Oral, QAM AC  acetaminophen (TYLENOL) tablet 650 mg, 650 mg, Oral, Q4H PRN  ondansetron (ZOFRAN-ODT) disintegrating tablet 4 mg, 4 mg, Oral, Q8H PRN  melatonin tablet 6 mg, 6 mg, Oral, Nightly  traZODone (DESYREL) tablet 50 mg, 50 mg, Oral, Nightly PRN  polyethylene glycol (GLYCOLAX) packet 17 g, 17 g, Oral, Daily PRN  diclofenac sodium (VOLTAREN) 1 % gel 2 g, 2 g, Topical, 4x Daily PRN  multivitamin 1 tablet, 1 tablet, Oral, Daily  Allergies:  Patient has no known allergies.    Social History:   MARITAL STATUS:  single    Family History:       Problem Relation Age of Onset    Diabetes Mother     Hypertension Mother     Hypertension Maternal Grandmother      REVIEW OF SYSTEMS:    CONSTITUTIONAL:  positive for  fatigue  EYES:  negative for  eye discharge  HEENT:  negative for  nasal congestion  RESPIRATORY:  negative for  dyspnea  CARDIOVASCULAR:  negative for  chest pain  GASTROINTESTINAL:  negative for vomiting  GENITOURINARY:  negative for dysuria  INTEGUMENT/BREAST:  negative for rash  HEMATOLOGIC/LYMPHATIC:  negative for petechiae  ALLERGIC/IMMUNOLOGIC:  negative for anaphylaxis  ENDOCRINE:  negative for diabetic symptoms including neuropathy  MUSCULOSKELETAL:  positive for  myalgias, arthralgias, decreased range of motion, muscle weakness, and bone pain  PHYSICAL EXAM:      Vitals:    /73   Pulse 88   Temp 97.7 °F (36.5 °C) (Oral)   Resp 18   Ht 1.575 m (5' 2\")   Wt 57.4 kg (126 lb 8.7 oz)   LMP 10/17/2024 (Approximate)   SpO2 100%   BMI 23.15 kg/m²     Well developed well nourished black female who is awake alert and cooperative  Skin atrophic  Membranes moist  Head normocephalic  Neck without mass  Chest symmetrical expansion  Heart S1S2 without murmur  Lungs CTA  Abd soft, non tender, normoactive BS and no mass  Ext without edema but sore in the right

## 2024-11-07 NOTE — PROGRESS NOTES
[facility] Nurse  Progress Note       Linda Don  AGE: 34 y.o.   GENDER: female  : 1990  UNIT: 8K-14/014-A  TODAY'S DATE:  2024  ADMISSION DATE: 2024  3:24 PM    Subjective   Reason for C Evaluation and Assessment: evaluation of bilateral knee and back wounds      Linda Don is a 34 y.o. female referred by:   [] Physician/ Resident/ PA/ DELPHINE-CNP  [x] Nursing  [] Other:     Wound Identification:  Wound Type:traumatic  Wound Location: Bilateral knees, lower back    Objective     Cristi Risk Score: Cristi Scale Score: 20      Assessment     Encounter: Present to pt room. Pt in chair. Wound photos, assessment and treatment recommendations below. Dressings removed from bilateral knees. Cleansed wound with normal saline and gauze. Pat dry with clean gauze. Patient has traumatic wounds. Recommend continuation of every other day foam dressing changes. Dressing removed from lower back. Cleansed wound with normal saline and gauze. Pat dry with clean gauze. Bordered foam dressing applied. Recommend same as above. Patient agreeable. Patient in chair, call light in reach. Will follow. Call with concerns.     Wound Care Documentation:  Wound 24 Knee Left;Anterior (Active)   Wound Image   24 124   Wound Etiology Traumatic 24 124   Dressing Status New dressing applied;Old drainage noted 24 1248   Wound Cleansed Cleansed with saline 24 1248   Dressing/Treatment Silicone border 24 124   Dressing Change Due 24 1248   Wound Length (cm) 2 cm 24 1248   Wound Width (cm) 3 cm 24 1248   Wound Depth (cm) 0.1 cm 24 1248   Wound Surface Area (cm^2) 6 cm^2 24 124   Change in Wound Size % (l*w) 27.27 24 1248   Wound Volume (cm^3) 0.6 cm^3 24 1248   Wound Assessment Epithelialization;Devitalized tissue;Slough;Pink/red 24 1248   Drainage Amount Small (< 25%) 24 1248   Drainage Description  (cm) 3.5 cm 11/05/24 1925   Wound Width (cm) 3 cm 11/05/24 1925   Wound Surface Area (cm^2) 10.5 cm^2 11/05/24 1925   Drainage Amount None (dry) 11/07/24 0845   Odor None 11/07/24 0845   Number of days: 1       Wound 11/05/24 Elbow Left;Posterior (Active)   Wound Image   11/05/24 1924   Dressing Status Clean;Dry;Intact 11/07/24 0845   Wound Cleansed Wound cleanser 11/07/24 0640   Dressing/Treatment Foam 11/07/24 0845   Wound Length (cm) 1 cm 11/05/24 1924   Wound Width (cm) 1 cm 11/05/24 1924   Wound Surface Area (cm^2) 1 cm^2 11/05/24 1924   Drainage Amount None (dry) 11/07/24 0845   Odor None 11/07/24 0845   Number of days: 1       Wound 11/05/24 Back Left;Lower (Active)   Wound Image   11/07/24 1248   Wound Etiology Traumatic 11/07/24 1248   Dressing Status Old drainage noted;New dressing applied 11/07/24 1248   Wound Cleansed Cleansed with saline 11/07/24 1248   Dressing/Treatment Silicone border 11/07/24 1248   Dressing Change Due 11/09/24 11/07/24 1248   Wound Length (cm) 6 cm 11/07/24 1248   Wound Width (cm) 6 cm 11/07/24 1248   Wound Depth (cm) 0.1 cm 11/07/24 1248   Wound Surface Area (cm^2) 36 cm^2 11/07/24 1248   Change in Wound Size % (l*w) -125 11/07/24 1248   Wound Volume (cm^3) 3.6 cm^3 11/07/24 1248   Wound Assessment Epithelialization;Pink/red 11/07/24 1248   Drainage Amount Small (< 25%) 11/07/24 1248   Drainage Description Serosanguinous 11/07/24 1248   Odor None 11/07/24 1248   Anuradha-wound Assessment Blanchable erythema 11/07/24 1248   Margins Attached edges 11/07/24 1248   Wound Thickness Description not for Pressure Injury Partial thickness 11/07/24 1248   Number of days: 1       Wound 11/05/24 Thigh Left;Anterior (Active)   Wound Image   11/05/24 1926   Dressing Status Dry;Intact 11/07/24 0845   Wound Cleansed Wound cleanser 11/07/24 0640   Dressing/Treatment Foam 11/07/24 0845   Wound Length (cm) 5.5 cm 11/05/24 1926   Wound Width (cm) 4 cm 11/05/24 1926   Wound Surface Area (cm^2) 22 cm^2

## 2024-11-07 NOTE — PLAN OF CARE
Problem: Discharge Planning  Goal: Discharge to home or other facility with appropriate resources  11/7/2024 1233 by Misa Millan LPN  Outcome: Progressing  Flowsheets (Taken 11/7/2024 1233)  Discharge to home or other facility with appropriate resources: Identify barriers to discharge with patient and caregiver  Note: Patient is planning to be discharged 10/26/24.     Problem: Pain  Goal: Verbalizes/displays adequate comfort level or baseline comfort level  11/7/2024 1233 by Misa Millan LPN  Outcome: Progressing  Flowsheets (Taken 11/7/2024 1233)  Verbalizes/displays adequate comfort level or baseline comfort level:   Encourage patient to monitor pain and request assistance   Assess pain using appropriate pain scale   Administer analgesics based on type and severity of pain and evaluate response   Implement non-pharmacological measures as appropriate and evaluate response     Problem: Skin/Tissue Integrity  Goal: Absence of new skin breakdown  Description: 1.  Monitor for areas of redness and/or skin breakdown  2.  Assess vascular access sites hourly  3.  Every 4-6 hours minimum:  Change oxygen saturation probe site  4.  Every 4-6 hours:  If on nasal continuous positive airway pressure, respiratory therapy assess nares and determine need for appliance change or resting period.  11/7/2024 1233 by Misa Millan LPN  Outcome: Progressing  Note: No new skin breakdown at this time.      Problem: Respiratory - Adult  Goal: Achieves optimal ventilation and oxygenation  11/7/2024 1233 by Misa Millan LPN  Outcome: Progressing  Flowsheets (Taken 11/7/2024 1233)  Achieves optimal ventilation and oxygenation: Assess for changes in respiratory status     Problem: Cardiovascular - Adult  Goal: Maintains optimal cardiac output and hemodynamic stability  11/7/2024 1233 by Misa Millan LPN  Outcome: Progressing  Flowsheets (Taken 11/7/2024 1233)  Maintains optimal cardiac output and

## 2024-11-07 NOTE — CONSULTS
Comprehensive Nutrition Assessment    Type and Reason for Visit:  Initial, Consult (Nutrition Assessment)    Nutrition Recommendations/Plan:   Recommend starting Vitamin D d/t low Vitamin D level of 12 ng/ml.  Continue current diet.  Started Ensure Plus TID.  Continue Multivitamin daily.     Malnutrition Assessment:  Malnutrition Status:  No malnutrition (11/07/24 1159)    Context:  Acute Illness     Findings of the 6 clinical characteristics of malnutrition:  Energy Intake:  No decrease in energy intake  Weight Loss:  No weight loss     Body Fat Loss:  No body fat loss     Muscle Mass Loss:  No muscle mass loss    Fluid Accumulation:  No fluid accumulation Extremities   Strength:  Not Performed    Nutrition Assessment: Pt. nutritionally compromised AEB admit d/t fall w/multiple fractures. At risk for further nutrition compromise r/t increased nutrient needs for healing multiple fractures and abrasions, right forehead contusion, TBI/concussion with brief LOS and underlying medical condition (Anemia).       Nutrition Related Findings:    Pt. Report/Treatments/Miscellaneous: Pt seen this morning- pt reports good appetite and intake of meals PTA and at present. Pt with multiple fractures 2/2 fall. Discussed need for ONS and increased intake of protein for wound healing. Pt amenable to consume Ensure Plus TID.  GI Status: pt reports constipation- Last BM x1 on 11/6.   Pertinent Labs: Glucose 109. Vitamin D 12 ng/ml  Pertinent Meds: Colace, Iron, Multivitamin, Movantik, Protonix, Senokot   Wound Type:  (multiple fractures from falls- right iliac wing fx, left pubic rami fx, left pubic ramus fx, right forehead contusion)       Current Nutrition Intake & Therapies:    Average Meal Intake: %, 51-75%  Average Supplements Intake:  (initiated today)  ADULT DIET; Regular  ADULT ORAL NUTRITION SUPPLEMENT; Breakfast, Lunch, Dinner; Standard High Calorie/High Protein Oral Supplement    Anthropometric Measures:  Height:  157.5 cm (5' 2\")  Ideal Body Weight (IBW): 110 lbs (50 kg)    Admission Body Weight: 57.4 kg (126 lb 8.7 oz) (11/5; no edema noted)  Current Body Weight: 57.4 kg (126 lb 8.7 oz) (11/5; no edema noted)  Current BMI (kg/m2): 23.1  Usual Body Weight:  (115-116 lbs per pt report. No weight hx per EMR)  BMI Categories: Normal Weight (BMI 18.5-24.9)    Estimated Daily Nutrient Needs:  Energy Requirements Based On: Kcal/kg  Weight Used for Energy Requirements: Current  Energy (kcal/day): 5934-9916 kcal/dy (25-30 kcal/kg)  Weight Used for Protein Requirements: Ideal  Protein (g/day): 60+ g/day (1.2+ g/kg)    Nutrition Diagnosis:   Increased nutrient needs related to increase demand for energy/nutrients as evidenced by wounds    Nutrition Interventions:   Food and/or Nutrient Delivery: Continue Current Diet, Start Oral Nutrition Supplement, Vitamin Supplement  Nutrition Education/Counseling: Education/Counseling initiated  Coordination of Nutrition Care: Continue to monitor while inpatient    Goals:  Goals: PO intake 75% or greater, by next RD assessment    Nutrition Monitoring and Evaluation:   Food/Nutrient Intake Outcomes: Food and Nutrient Intake, Vitamin/Mineral Intake, Supplement Intake  Physical Signs/Symptoms Outcomes: Biochemical Data, GI Status, Skin, Weight, Nutrition Focused Physical Findings    Discharge Planning:    Too soon to determine     Neelam Melchor MS, RD, LD  Contact: [phone]

## 2024-11-07 NOTE — CARE COORDINATION
Pt rested well last night. Pt was complaining of mild to moderate pain on her legs, pain medications given. CHG bath given in AM. She was continent of bladder and bowel. Went to the bathroom with 1 assist via walker, tolerated well. Electronically signed by Christoph Mohr RN on 11/7/2024 at 6:52 AM

## 2024-11-08 LAB
BACTERIA UR CULT: ABNORMAL
ORGANISM: ABNORMAL

## 2024-11-08 PROCEDURE — 6360000002 HC RX W HCPCS: Performed by: PHYSICAL MEDICINE & REHABILITATION

## 2024-11-08 PROCEDURE — 97530 THERAPEUTIC ACTIVITIES: CPT

## 2024-11-08 PROCEDURE — 97110 THERAPEUTIC EXERCISES: CPT

## 2024-11-08 PROCEDURE — 6370000000 HC RX 637 (ALT 250 FOR IP): Performed by: PHYSICAL MEDICINE & REHABILITATION

## 2024-11-08 PROCEDURE — 97535 SELF CARE MNGMENT TRAINING: CPT

## 2024-11-08 PROCEDURE — 99232 SBSQ HOSP IP/OBS MODERATE 35: CPT | Performed by: PHYSICAL MEDICINE & REHABILITATION

## 2024-11-08 PROCEDURE — 97116 GAIT TRAINING THERAPY: CPT

## 2024-11-08 PROCEDURE — 2580000003 HC RX 258: Performed by: PHYSICAL MEDICINE & REHABILITATION

## 2024-11-08 PROCEDURE — 1180000000 HC REHAB R&B

## 2024-11-08 PROCEDURE — 6370000000 HC RX 637 (ALT 250 FOR IP): Performed by: FAMILY MEDICINE

## 2024-11-08 RX ORDER — SULFAMETHOXAZOLE AND TRIMETHOPRIM 800; 160 MG/1; MG/1
1 TABLET ORAL EVERY 12 HOURS SCHEDULED
Status: COMPLETED | OUTPATIENT
Start: 2024-11-08 | End: 2024-11-13

## 2024-11-08 RX ADMIN — OXYCODONE 10 MG: 5 TABLET ORAL at 00:38

## 2024-11-08 RX ADMIN — SULFAMETHOXAZOLE AND TRIMETHOPRIM 1 TABLET: 800; 160 TABLET ORAL at 20:15

## 2024-11-08 RX ADMIN — OXYCODONE 10 MG: 5 TABLET ORAL at 23:51

## 2024-11-08 RX ADMIN — Medication 1 TABLET: at 09:45

## 2024-11-08 RX ADMIN — CALCIUM 500 MG: 500 TABLET ORAL at 09:45

## 2024-11-08 RX ADMIN — OXYCODONE 10 MG: 5 TABLET ORAL at 13:29

## 2024-11-08 RX ADMIN — ENOXAPARIN SODIUM 40 MG: 100 INJECTION SUBCUTANEOUS at 09:45

## 2024-11-08 RX ADMIN — DOCUSATE SODIUM 100 MG: 100 CAPSULE, LIQUID FILLED ORAL at 20:13

## 2024-11-08 RX ADMIN — CALCIUM 500 MG: 500 TABLET ORAL at 16:50

## 2024-11-08 RX ADMIN — DICLOFENAC SODIUM 2 G: 10 GEL TOPICAL at 13:31

## 2024-11-08 RX ADMIN — OXYCODONE 10 MG: 5 TABLET ORAL at 06:39

## 2024-11-08 RX ADMIN — TRAZODONE HYDROCHLORIDE 50 MG: 50 TABLET ORAL at 00:38

## 2024-11-08 RX ADMIN — ACETAMINOPHEN 650 MG: 325 TABLET ORAL at 16:49

## 2024-11-08 RX ADMIN — PANTOPRAZOLE SODIUM 40 MG: 40 TABLET, DELAYED RELEASE ORAL at 06:40

## 2024-11-08 RX ADMIN — DICLOFENAC SODIUM 2 G: 10 GEL TOPICAL at 09:46

## 2024-11-08 RX ADMIN — NITROFURANTOIN MONOHYDRATE/MACROCRYSTALS 100 MG: 75; 25 CAPSULE ORAL at 09:45

## 2024-11-08 RX ADMIN — SENNOSIDES 8.6 MG: 8.6 TABLET, FILM COATED ORAL at 20:13

## 2024-11-08 RX ADMIN — SODIUM CHLORIDE, PRESERVATIVE FREE 5 ML: 5 INJECTION INTRAVENOUS at 09:47

## 2024-11-08 RX ADMIN — ACETAMINOPHEN 650 MG: 325 TABLET ORAL at 09:44

## 2024-11-08 RX ADMIN — DICLOFENAC SODIUM 2 G: 10 GEL TOPICAL at 09:40

## 2024-11-08 RX ADMIN — ACETAMINOPHEN 650 MG: 325 TABLET ORAL at 20:12

## 2024-11-08 RX ADMIN — Medication 12.5 MG: at 06:40

## 2024-11-08 RX ADMIN — FERROUS SULFATE TAB 325 MG (65 MG ELEMENTAL FE) 325 MG: 325 (65 FE) TAB at 16:50

## 2024-11-08 RX ADMIN — SODIUM CHLORIDE, PRESERVATIVE FREE 10 ML: 5 INJECTION INTRAVENOUS at 20:12

## 2024-11-08 RX ADMIN — ACETAMINOPHEN 650 MG: 325 TABLET ORAL at 04:30

## 2024-11-08 RX ADMIN — FERROUS SULFATE TAB 325 MG (65 MG ELEMENTAL FE) 325 MG: 325 (65 FE) TAB at 09:45

## 2024-11-08 RX ADMIN — DOCUSATE SODIUM 100 MG: 100 CAPSULE, LIQUID FILLED ORAL at 09:45

## 2024-11-08 RX ADMIN — DICLOFENAC SODIUM 2 G: 10 GEL TOPICAL at 16:54

## 2024-11-08 ASSESSMENT — PAIN SCALES - GENERAL
PAINLEVEL_OUTOF10: 7
PAINLEVEL_OUTOF10: 6
PAINLEVEL_OUTOF10: 4
PAINLEVEL_OUTOF10: 9
PAINLEVEL_OUTOF10: 6
PAINLEVEL_OUTOF10: 6
PAINLEVEL_OUTOF10: 5
PAINLEVEL_OUTOF10: 6
PAINLEVEL_OUTOF10: 8
PAINLEVEL_OUTOF10: 6
PAINLEVEL_OUTOF10: 6
PAINLEVEL_OUTOF10: 7
PAINLEVEL_OUTOF10: 6
PAINLEVEL_OUTOF10: 6
PAINLEVEL_OUTOF10: 3
PAINLEVEL_OUTOF10: 0
PAINLEVEL_OUTOF10: 6
PAINLEVEL_OUTOF10: 7
PAINLEVEL_OUTOF10: 6
PAINLEVEL_OUTOF10: 6
PAINLEVEL_OUTOF10: 5
PAINLEVEL_OUTOF10: 7

## 2024-11-08 ASSESSMENT — PAIN DESCRIPTION - DESCRIPTORS
DESCRIPTORS_2: ACHING
DESCRIPTORS: ACHING
DESCRIPTORS: ACHING
DESCRIPTORS: SQUEEZING
DESCRIPTORS: TIGHTNESS;ACHING

## 2024-11-08 ASSESSMENT — PAIN DESCRIPTION - ORIENTATION
ORIENTATION: LEFT;RIGHT
ORIENTATION_2: LOWER
ORIENTATION: LEFT
ORIENTATION: LEFT
ORIENTATION: LOWER;RIGHT

## 2024-11-08 ASSESSMENT — PAIN DESCRIPTION - LOCATION
LOCATION_2: BACK
LOCATION: LEG;BACK
LOCATION: LEG
LOCATION: BACK
LOCATION: GROIN;BACK
LOCATION: LEG

## 2024-11-08 ASSESSMENT — PAIN - FUNCTIONAL ASSESSMENT: PAIN_FUNCTIONAL_ASSESSMENT_SITE2: ACTIVITIES ARE NOT PREVENTED

## 2024-11-08 NOTE — PLAN OF CARE
Problem: Discharge Planning  Goal: Discharge to home or other facility with appropriate resources  11/8/2024 1120 by Sylvia Guerra RN  Outcome: Progressing  Flowsheets (Taken 11/8/2024 1120)  Discharge to home or other facility with appropriate resources:   Identify barriers to discharge with patient and caregiver   Arrange for needed discharge resources and transportation as appropriate   Identify discharge learning needs (meds, wound care, etc)   Refer to discharge planning if patient needs post-hospital services based on physician order or complex needs related to functional status, cognitive ability or social support system     Problem: Pain  Goal: Verbalizes/displays adequate comfort level or baseline comfort level  11/8/2024 1120 by Sylvia Guerra RN  Outcome: Progressing  Flowsheets (Taken 11/8/2024 1120)  Verbalizes/displays adequate comfort level or baseline comfort level:   Encourage patient to monitor pain and request assistance   Assess pain using appropriate pain scale   Administer analgesics based on type and severity of pain and evaluate response   Implement non-pharmacological measures as appropriate and evaluate response   Consider cultural and social influences on pain and pain management   Notify Licensed Independent Practitioner if interventions unsuccessful or patient reports new pain     Problem: Skin/Tissue Integrity  Goal: Absence of new skin breakdown  Description: 1.  Monitor for areas of redness and/or skin breakdown  2.  Assess vascular access sites hourly  3.  Every 4-6 hours minimum:  Change oxygen saturation probe site  4.  Every 4-6 hours:  If on nasal continuous positive airway pressure, respiratory therapy assess nares and determine need for appliance change or resting period.  11/8/2024 1120 by Sylvia Guerra, RN  Outcome: Progressing     Problem: Cardiovascular - Adult  Goal: Maintains optimal cardiac output and hemodynamic stability  11/8/2024 1120 by Sylvia Guerra

## 2024-11-08 NOTE — PROGRESS NOTES
Physical Medicine & Rehabilitation Progress Note    Chief Complaint:  Fall on stairs resulting pain at lower back and left groin     Subjective:    Linda Don is a 34 y.o. right-handed -American female with no significant past medical history, was admitted on 11/5/2024 for intensive inpatient management of impairment & disability secondary to injuries sustained in a fall accident on the stair at home on 11/2/2024.     The patient says she stayed over the night in a casino and returned home on early 11/2/2024 morning.  While she was walking up the stairs to reach her condo apartment on the second floor, her foot slipped causing her to lose balance because of slippery wooden floor with presence of her sock.  She then fell down to the bottom of the stair (approximately 12 steps).  She says her forehead hit something and she blacked out briefly.  Initially she feels severe pain at her lower back, right knee and right ankle.  When she tried to get up using her left lower extremity, she felt severe pain at her left groin.  Her crying and screaming woke her boyfriend up.  After she calmed down, she asked her boyfriend to bring her to emergency room.  Therefore her boyfriend brought her to [facility] ER for evaluation.  CT of abdomen and pelvis, and lumbar spine were performed at [facility] ER.  She was found to have significant pelvis fracture.  The patient then was transferred to [facility] for further care.  Images of abdomen/pelvis and the lumbar spine CT from [facility] were reviewed by radiologist.  She was found to have nondisplaced right iliac bone fracture superiorly, posteriorly and medially, nondisplaced left superior pubic rami's comminuted fracture, and nondisplaced acute left inferior pubic ramus fracture.  Because of multiple areas of skin abrasion wounds, additional x-ray of chest, left femur, right knee, left tibia/fibula, left wrist were done at [facility]  Continue regular diet.    Bladder: Monitoring signs or symptoms of UTI  Bowel: Monitoring signs or symptoms of constipation   and case management for coordination of care and discharge planning      Missed Therapy Time:  None      ESTELA KISER MD     This report has been created using voice recognition software. It may contain minor errors which are inherent in voice recognition technology

## 2024-11-08 NOTE — PROGRESS NOTES
[facility]  Occupational Therapy  Daily Note    Discharge Recommendations: Home with Home Health OT vs HEP if pt does OP.   Equipment Recommendations:   Pt does not own any equiptment at this time; recommend shower chair and grab bars around toilet and in shower    Time In: 1100  Time Out: 1230  Timed Code Treatment Minutes: 90 Minutes  Minutes: 90          Date: 2024  Patient Name: Linda Don,   Gender: female      Room: [facility]14/014  MRN: [id number]  : 1990  (34 y.o.)  Referring Practitioner: Ignacio Mota MD  Diagnosis: Multiple fractures of pelvis with stable disruption of pelvic ring, initial encounter for closed fracture (HCC)  Additional Pertinent Hx: Linda Don is a 34 y.o. right-handed -American female with no significant past medical history, was admitted on 2024 for intensive inpatient management of impairment & disability secondary to injuries sustained in a fall accident on the stair at home on 2024. The patient says she stayed over the night in a casino and returned home on early 2024 morning. While she was walking up the stairs to reach her condo apartment on the second floor, her foot slipped causing her to lose balance because of slippery wooden floor with presence of her sock.  She then fell down to the bottom of the stair (approximately 12 steps).  She says her forehead hit something and she blacked out briefly.  Initially she feels severe pain at her lower back, right knee and right ankle.  When she tried to get up using her left lower extremity, she felt severe pain at her left groin.  Her crying and screaming woke her boyfriend up.  After she calmed down, she asked her boyfriend to bring her to emergency room.  Therefore her boyfriend brought her to [facility] ER for evaluation.  CT of abdomen and pelvis, and lumbar spine were performed at [facility] ER.  She was found to have  demonstrate an increased score on the Modified Barthel Index to 95/100 to promote increased occupational performance in ADL tasks.  Long Term Goal 2: Pt will complete a light IADL task with modified independence to increase independence in cooking tasks.  Long Term Goal 3: Pt will complete a simple community re-integration task with modified independence to increse independence in going to restaurants with boyfriend.  Long Term Goal 4: Pt will complete a simple  task with modified independence to improve occupational performance in taking care of son.    Following session, patient left in safe position with all fall risk precautions in place.

## 2024-11-08 NOTE — PROGRESS NOTES
[facility]  INPATIENT PHYSICAL THERAPY  DAILY NOTE  [facility] - 8K-14/014-A      Discharge Recommendations: Continue to assess pending progress  Equipment Recommendations: Yes (Continue to assess pending progress (Patient will likely require RW and possible manual whelchair))               Time In: 0720  Time Out: 0850  Timed Code Treatment Minutes: 90 Minutes  Minutes: 90          Date: 2024  Patient Name: Linda Don,  Gender:  female        MRN: [id number]  : 1990  (34 y.o.)     Referring Practitioner: Ignacio Mota MD  Diagnosis: Multiple fractures of pelvis with stable disruption of pelvic ring, initial encounter for closed fracture (HCC)  Additional Pertinent Hx: Per H&P \"Linda Don is a 34 y.o. right-handed -American female with no significant past medical history, is admitted to the inpatient rehabilitation unit on 2024 for intensive inpatient rehabilitation treatment of impairment and disability secondary to injuries sustained in a fall accident on the stairs at home on 2024.     The patient says she stayed over the night in a casino and returned home on early 2024 morning.  While she was walking up the stairs to reach her condo apartment on the second floor, her foot slipped causing her to lose balance because of slippery wooden floor with presence of her sock.  She then fell down to the bottom of the stair (approximately 12 steps).  She says her forehead hit something and she blacked out briefly.  Initially she feels severe pain at her lower back, right knee and right ankle.  When she tried to get up using her left lower extremity, she felt severe pain at her left groin.  Her crying and screaming woke her boyfriend up.  After she calmed down, she asked her boyfriend to bring her to emergency room.  Therefore her boyfriend brought her to [facility] ER for evaluation.  CT of abdomen and pelvis, and lumbar  bars    Stairs:  Platform: 4\" platform step using Parallel Bars and Stand By Assistance to CGA.  forward/lateral step ups leading RLE x 10 reps each  *cautious and guarded, cues for foot placement    Balance:  Not Tested    Exercise:  Patient was guided in 1 set(s) 10-15 reps of exercises to both lower extremities:   Ankle pumps x15 reps   Heelslides x10 reps and required Mod A to complete due to increased pain  Short arc quads x15 reps   Seated marches, Seated hamstring curls, Seated heel/toe raises, Long arc quads, Seated isometric hip adduction x15 reps each  Standing heel/toe raises, Standing marches, and Standing hip flexion x10 reps    Exercises were completed for increased independence with functional mobility.  Pt. Completed 6 minutes on Nu-Step machine on L1 utilizing Bilateral Upper Extremities and Bilateral Lower Extremities to improve strength and endurance for improved functional mobility.     Functional Outcome Measures:  Not completed  Modified [place] Scale:  Not Applicable    ASSESSMENT:  Assessment: Patient progressing toward established goals.  Activity Tolerance:  Patient tolerance of  treatment:Good.  Plan: Current Treatment Recommendations: Strengthening, Balance training, Functional mobility training, Transfer training, Neuromuscular re-education, Endurance training, Home exercise program, Equipment evaluation, education, & procurement, Safety education & training, Patient/Caregiver education & training, Therapeutic activities, Pain management, Gait training, Stair training  General Plan:  (5x/wk 90 min)    Education:  Learners: Patient  Patient Education: Plan of Care, Precautions/Restrictions, Bed Mobility, Transfers, Gait, Stairs, Car Transfers, Up in Chair for All Meals, Verbal Exercise Instruction    Goals:  Patient Goals : \"Walking, standing time and walking up/down steps without pain\"  Short Term Goals  Time Frame for Short Term Goals: 1 week  Short Term Goal 1: Patient will complete

## 2024-11-08 NOTE — CARE COORDINATION
PT is up with one assist with walker. Worked well with therapy. Complains of pain to back rated 7/10 and describes pain as squeezing. Pain relieved with ice, rest and medication.

## 2024-11-08 NOTE — PLAN OF CARE
Problem: Pain  Goal: Verbalizes/displays adequate comfort level or baseline comfort level  11/7/2024 2240 by Christoph Mohr, RN  Outcome: Progressing  Flowsheets (Taken 11/7/2024 1945)  Verbalizes/displays adequate comfort level or baseline comfort level: Encourage patient to monitor pain and request assistance     Problem: Skin/Tissue Integrity  Goal: Absence of new skin breakdown  Description: 1.  Monitor for areas of redness and/or skin breakdown  2.  Assess vascular access sites hourly  3.  Every 4-6 hours minimum:  Change oxygen saturation probe site  4.  Every 4-6 hours:  If on nasal continuous positive airway pressure, respiratory therapy assess nares and determine need for appliance change or resting period.  11/7/2024 2240 by Christoph Mohr, RN  Outcome: Progressing     Problem: Musculoskeletal - Adult  Goal: Return mobility to safest level of function  11/7/2024 2240 by Christoph Mohr, RN  Outcome: Progressing  Flowsheets (Taken 11/7/2024 1945)  Return Mobility to Safest Level of Function: Assess patient stability and activity tolerance for standing, transferring and ambulating with or without assistive devices     Problem: Safety - Adult  Goal: Free from fall injury  11/7/2024 2240 by Christoph Mohr, RN  Outcome: Progressing

## 2024-11-09 PROCEDURE — 1180000000 HC REHAB R&B

## 2024-11-09 PROCEDURE — 97530 THERAPEUTIC ACTIVITIES: CPT

## 2024-11-09 PROCEDURE — 97110 THERAPEUTIC EXERCISES: CPT

## 2024-11-09 PROCEDURE — 97116 GAIT TRAINING THERAPY: CPT

## 2024-11-09 PROCEDURE — 2580000003 HC RX 258: Performed by: PHYSICAL MEDICINE & REHABILITATION

## 2024-11-09 PROCEDURE — 6370000000 HC RX 637 (ALT 250 FOR IP): Performed by: FAMILY MEDICINE

## 2024-11-09 PROCEDURE — 97535 SELF CARE MNGMENT TRAINING: CPT

## 2024-11-09 PROCEDURE — 6370000000 HC RX 637 (ALT 250 FOR IP): Performed by: PHYSICAL MEDICINE & REHABILITATION

## 2024-11-09 PROCEDURE — 6360000002 HC RX W HCPCS: Performed by: PHYSICAL MEDICINE & REHABILITATION

## 2024-11-09 RX ORDER — VITAMIN B COMPLEX
5000 TABLET ORAL DAILY
Status: DISCONTINUED | OUTPATIENT
Start: 2024-11-10 | End: 2024-11-16 | Stop reason: HOSPADM

## 2024-11-09 RX ADMIN — DOCUSATE SODIUM 100 MG: 100 CAPSULE, LIQUID FILLED ORAL at 09:15

## 2024-11-09 RX ADMIN — ENOXAPARIN SODIUM 40 MG: 100 INJECTION SUBCUTANEOUS at 09:13

## 2024-11-09 RX ADMIN — SULFAMETHOXAZOLE AND TRIMETHOPRIM 1 TABLET: 800; 160 TABLET ORAL at 09:15

## 2024-11-09 RX ADMIN — CALCIUM 500 MG: 500 TABLET ORAL at 16:56

## 2024-11-09 RX ADMIN — DICLOFENAC SODIUM 2 G: 10 GEL TOPICAL at 16:57

## 2024-11-09 RX ADMIN — ACETAMINOPHEN 650 MG: 325 TABLET ORAL at 22:16

## 2024-11-09 RX ADMIN — FERROUS SULFATE TAB 325 MG (65 MG ELEMENTAL FE) 325 MG: 325 (65 FE) TAB at 09:15

## 2024-11-09 RX ADMIN — CYCLOBENZAPRINE 10 MG: 10 TABLET, FILM COATED ORAL at 12:07

## 2024-11-09 RX ADMIN — ACETAMINOPHEN 650 MG: 325 TABLET ORAL at 05:05

## 2024-11-09 RX ADMIN — CALCIUM 500 MG: 500 TABLET ORAL at 09:15

## 2024-11-09 RX ADMIN — SENNOSIDES 8.6 MG: 8.6 TABLET, FILM COATED ORAL at 19:55

## 2024-11-09 RX ADMIN — MAGNESIUM HYDROXIDE 15 ML: 1200 LIQUID ORAL at 09:13

## 2024-11-09 RX ADMIN — Medication 1 TABLET: at 09:15

## 2024-11-09 RX ADMIN — FERROUS SULFATE TAB 325 MG (65 MG ELEMENTAL FE) 325 MG: 325 (65 FE) TAB at 16:56

## 2024-11-09 RX ADMIN — DOCUSATE SODIUM 100 MG: 100 CAPSULE, LIQUID FILLED ORAL at 19:55

## 2024-11-09 RX ADMIN — OXYCODONE 10 MG: 5 TABLET ORAL at 06:30

## 2024-11-09 RX ADMIN — SULFAMETHOXAZOLE AND TRIMETHOPRIM 1 TABLET: 800; 160 TABLET ORAL at 19:55

## 2024-11-09 RX ADMIN — OXYCODONE 10 MG: 5 TABLET ORAL at 16:56

## 2024-11-09 RX ADMIN — SODIUM CHLORIDE, PRESERVATIVE FREE 10 ML: 5 INJECTION INTRAVENOUS at 19:55

## 2024-11-09 RX ADMIN — OXYCODONE 10 MG: 5 TABLET ORAL at 12:06

## 2024-11-09 RX ADMIN — DICLOFENAC SODIUM 2 G: 10 GEL TOPICAL at 12:14

## 2024-11-09 RX ADMIN — ACETAMINOPHEN 650 MG: 325 TABLET ORAL at 16:56

## 2024-11-09 RX ADMIN — ACETAMINOPHEN 650 MG: 325 TABLET ORAL at 09:25

## 2024-11-09 RX ADMIN — SODIUM CHLORIDE, PRESERVATIVE FREE 10 ML: 5 INJECTION INTRAVENOUS at 09:25

## 2024-11-09 RX ADMIN — DICLOFENAC SODIUM 2 G: 10 GEL TOPICAL at 09:14

## 2024-11-09 RX ADMIN — Medication 12.5 MG: at 05:05

## 2024-11-09 RX ADMIN — POLYETHYLENE GLYCOL 3350 17 G: 17 POWDER, FOR SOLUTION ORAL at 14:04

## 2024-11-09 ASSESSMENT — PAIN - FUNCTIONAL ASSESSMENT
PAIN_FUNCTIONAL_ASSESSMENT: ACTIVITIES ARE NOT PREVENTED

## 2024-11-09 ASSESSMENT — PAIN SCALES - GENERAL
PAINLEVEL_OUTOF10: 7
PAINLEVEL_OUTOF10: 6
PAINLEVEL_OUTOF10: 0
PAINLEVEL_OUTOF10: 0
PAINLEVEL_OUTOF10: 4
PAINLEVEL_OUTOF10: 3
PAINLEVEL_OUTOF10: 0
PAINLEVEL_OUTOF10: 7
PAINLEVEL_OUTOF10: 8

## 2024-11-09 ASSESSMENT — PAIN DESCRIPTION - LOCATION
LOCATION: BACK
LOCATION: GROIN
LOCATION: BACK

## 2024-11-09 ASSESSMENT — PAIN SCALES - WONG BAKER
WONGBAKER_NUMERICALRESPONSE: NO HURT
WONGBAKER_NUMERICALRESPONSE: 0;4

## 2024-11-09 ASSESSMENT — PAIN DESCRIPTION - ORIENTATION
ORIENTATION: LEFT
ORIENTATION: LOWER
ORIENTATION: LEFT
ORIENTATION: LEFT

## 2024-11-09 ASSESSMENT — PAIN DESCRIPTION - DESCRIPTORS
DESCRIPTORS: TIGHTNESS
DESCRIPTORS: ACHING
DESCRIPTORS: ACHING

## 2024-11-09 ASSESSMENT — PAIN DESCRIPTION - PAIN TYPE: TYPE: ACUTE PAIN

## 2024-11-09 ASSESSMENT — PAIN DESCRIPTION - ONSET: ONSET: ON-GOING

## 2024-11-09 ASSESSMENT — PAIN DESCRIPTION - FREQUENCY: FREQUENCY: INTERMITTENT

## 2024-11-09 NOTE — CARE COORDINATION
Patient is alert, oriented x 4 and pleasant. Patient has not had a bowel movement since November 5th, and bowel medications have been administered. The patient declined the Dulcolax suppository but indicated that she will take it if no bowel movement occurs by this evening. All wounds have been photographed, measured and documented in the chart. The patient tolerated all therapies well and is resting comfortably in bed with the call light within reach.

## 2024-11-09 NOTE — PROGRESS NOTES
[facility]  INPATIENT PHYSICAL THERAPY  DAILY NOTE  [facility] - 8K-14/014-A      Discharge Recommendations: Continue to assess pending progress  Equipment Recommendations: Yes (Continue to assess pending progress (Patient will likely require RW and possible manual whelchair))               Time In: 1034  Time Out: 1204  Timed Code Treatment Minutes: 90 Minutes  Minutes: 90          Date: 2024  Patient Name: Linda Don,  Gender:  female        MRN: [id number]  : 1990  (34 y.o.)     Referring Practitioner: Ignacio Mota MD  Diagnosis: Multiple fractures of pelvis with stable disruption of pelvic ring, initial encounter for closed fracture (HCC)  Additional Pertinent Hx: Per H&P \"Linda Don is a 34 y.o. right-handed -American female with no significant past medical history, is admitted to the inpatient rehabilitation unit on 2024 for intensive inpatient rehabilitation treatment of impairment and disability secondary to injuries sustained in a fall accident on the stairs at home on 2024.     The patient says she stayed over the night in a casino and returned home on early 2024 morning.  While she was walking up the stairs to reach her condo apartment on the second floor, her foot slipped causing her to lose balance because of slippery wooden floor with presence of her sock.  She then fell down to the bottom of the stair (approximately 12 steps).  She says her forehead hit something and she blacked out briefly.  Initially she feels severe pain at her lower back, right knee and right ankle.  When she tried to get up using her left lower extremity, she felt severe pain at her left groin.  Her crying and screaming woke her boyfriend up.  After she calmed down, she asked her boyfriend to bring her to emergency room.  Therefore her boyfriend brought her to [facility] ER for evaluation.  CT of abdomen and pelvis, and lumbar

## 2024-11-09 NOTE — PROGRESS NOTES
[facility]  Occupational Therapy  Daily Note    Discharge Recommendations: Continue to assess pending progress and Patient would benefit from continued OT at discharge  Equipment Recommendations:   Pt does not own any equiptment at this time; recommend shower chair and grab bars around toilet and in shower      Time In: 0700  Time Out: 0830  Timed Code Treatment Minutes: 90 Minutes  Minutes: 90          Date: 2024  Patient Name: Linda Don,   Gender: female      Room: -14/014-A  MRN: [id number]  : 1990  (34 y.o.)  Referring Practitioner: Ignacio Mota MD  Diagnosis: Multiple fractures of pelvis with stable disruption of pelvic ring, initial encounter for closed fracture (HCC)  Additional Pertinent Hx: Linda Don is a 34 y.o. right-handed -American female with no significant past medical history, was admitted on 2024 for intensive inpatient management of impairment & disability secondary to injuries sustained in a fall accident on the stair at home on 2024. The patient says she stayed over the night in a casino and returned home on early 2024 morning. While she was walking up the stairs to reach her condo apartment on the second floor, her foot slipped causing her to lose balance because of slippery wooden floor with presence of her sock.  She then fell down to the bottom of the stair (approximately 12 steps).  She says her forehead hit something and she blacked out briefly.  Initially she feels severe pain at her lower back, right knee and right ankle.  When she tried to get up using her left lower extremity, she felt severe pain at her left groin.  Her crying and screaming woke her boyfriend up.  After she calmed down, she asked her boyfriend to bring her to emergency room.  Therefore her boyfriend brought her to [facility] ER for evaluation.  CT of abdomen and pelvis, and lumbar spine were performed at

## 2024-11-09 NOTE — PLAN OF CARE
Problem: Discharge Planning  Goal: Discharge to home or other facility with appropriate resources  11/8/2024 2300 by Kasie Medrano LPN  Outcome: Progressing  Flowsheets (Taken 11/8/2024 2003)  Discharge to home or other facility with appropriate resources:   Identify barriers to discharge with patient and caregiver   Arrange for needed discharge resources and transportation as appropriate   Identify discharge learning needs (meds, wound care, etc)     Problem: Pain  Goal: Verbalizes/displays adequate comfort level or baseline comfort level  11/8/2024 2300 by Kasie Medrano LPN  Outcome: Progressing     Problem: Skin/Tissue Integrity  Goal: Absence of new skin breakdown  Description: 1.  Monitor for areas of redness and/or skin breakdown  2.  Assess vascular access sites hourly  3.  Every 4-6 hours minimum:  Change oxygen saturation probe site  4.  Every 4-6 hours:  If on nasal continuous positive airway pressure, respiratory therapy assess nares and determine need for appliance change or resting period.  11/8/2024 2300 by Kasie Medrano LPN  Outcome: Progressing     Problem: Respiratory - Adult  Goal: Achieves optimal ventilation and oxygenation  11/8/2024 2300 by Kasie Medrano LPN  Outcome: Progressing  Flowsheets (Taken 11/8/2024 2003)  Achieves optimal ventilation and oxygenation:   Assess for changes in respiratory status   Assess for changes in mentation and behavior     Problem: Cardiovascular - Adult  Goal: Maintains optimal cardiac output and hemodynamic stability  11/8/2024 2300 by Kasie Medrano LPN  Outcome: Progressing  Flowsheets (Taken 11/8/2024 2003)  Maintains optimal cardiac output and hemodynamic stability:   Monitor blood pressure and heart rate   Monitor urine output and notify Licensed Independent Practitioner for values outside of normal range   Assess for signs of decreased cardiac output     Problem: Skin/Tissue Integrity - Adult  Goal: Skin

## 2024-11-09 NOTE — CARE COORDINATION
Pt advised that she was having a pain of 8/10. LPN offered ice as well as pain medications. Pt accepted both. Pt also advised that she did not want melatonin tonight. After administering the pain medication, PT was satisfied with results. Pt walked to bathroom and back to bed with little help from LPN. Pt slept well through night. Pt comfortable with call light in reach.

## 2024-11-09 NOTE — PLAN OF CARE
Problem: Discharge Planning  Goal: Discharge to home or other facility with appropriate resources  11/9/2024 1052 by Lucita Mohr RN  Outcome: Progressing  Flowsheets (Taken 11/9/2024 1052)  Discharge to home or other facility with appropriate resources:   Identify barriers to discharge with patient and caregiver   Arrange for needed discharge resources and transportation as appropriate   Identify discharge learning needs (meds, wound care, etc)    Problem: Pain  Goal: Verbalizes/displays adequate comfort level or baseline comfort level  11/9/2024 1052 by Lucita Mohr RN  Outcome: Progressing  Flowsheets (Taken 11/9/2024 1052)  Verbalizes/displays adequate comfort level or baseline comfort level:   Encourage patient to monitor pain and request assistance   Assess pain using appropriate pain scale   Administer analgesics based on type and severity of pain and evaluate response  11/8/2024 2300 by Kasie Medrano LPN  Outcome: Progressing     Problem: Skin/Tissue Integrity  Goal: Absence of new skin breakdown  Description: 1.  Monitor for areas of redness and/or skin breakdown  2.  Assess vascular access sites hourly  3.  Every 4-6 hours minimum:  Change oxygen saturation probe site  4.  Every 4-6 hours:  If on nasal continuous positive airway pressure, respiratory therapy assess nares and determine need for appliance change or resting period.  11/9/2024 1052 by Lucita Mohr RN  Outcome: Progressing  11/8/2024 2300 by Kasie Medrano LPN  Outcome: Progressing     Problem: Cardiovascular - Adult  Goal: Maintains optimal cardiac output and hemodynamic stability  11/9/2024 1052 by Lucita Mohr RN  Outcome: Progressing  Flowsheets (Taken 11/9/2024 1052)  Maintains optimal cardiac output and hemodynamic stability: Monitor blood pressure and heart rate       Problem: Skin/Tissue Integrity - Adult  Goal: Skin integrity remains intact  11/9/2024 1052 by Lucita Mohr RN  Outcome:  [place], RN  Outcome: Progressing  Flowsheets (Taken 11/9/2024 1052)  Absence of infection during hospitalization:   Assess and monitor for signs and symptoms of infection   Monitor lab/diagnostic results     Problem: Safety - Adult  Goal: Free from fall injury  Outcome: Progressing  Flowsheets (Taken 11/9/2024 1052)  Free From Fall Injury:   Instruct family/caregiver on patient safety   Based on caregiver fall risk screen, instruct family/caregiver to ask for assistance with transferring infant if caregiver noted to have fall risk factors     Problem: Nutrition Deficit:  Goal: Optimize nutritional status  Outcome: Progressing  Flowsheets (Taken 11/9/2024 1052)  Nutrient intake appropriate for improving, restoring, or maintaining nutritional needs:   Assess nutritional status and recommend course of action   Monitor oral intake, labs, and treatment plans

## 2024-11-10 PROCEDURE — 2580000003 HC RX 258: Performed by: PHYSICAL MEDICINE & REHABILITATION

## 2024-11-10 PROCEDURE — 6370000000 HC RX 637 (ALT 250 FOR IP): Performed by: FAMILY MEDICINE

## 2024-11-10 PROCEDURE — 1180000000 HC REHAB R&B

## 2024-11-10 PROCEDURE — 6370000000 HC RX 637 (ALT 250 FOR IP): Performed by: PHYSICAL MEDICINE & REHABILITATION

## 2024-11-10 PROCEDURE — 6360000002 HC RX W HCPCS: Performed by: PHYSICAL MEDICINE & REHABILITATION

## 2024-11-10 RX ADMIN — SODIUM CHLORIDE, PRESERVATIVE FREE 10 ML: 5 INJECTION INTRAVENOUS at 09:58

## 2024-11-10 RX ADMIN — OXYCODONE 10 MG: 5 TABLET ORAL at 05:23

## 2024-11-10 RX ADMIN — DOCUSATE SODIUM 100 MG: 100 CAPSULE, LIQUID FILLED ORAL at 07:50

## 2024-11-10 RX ADMIN — SENNOSIDES 8.6 MG: 8.6 TABLET, FILM COATED ORAL at 21:53

## 2024-11-10 RX ADMIN — PANTOPRAZOLE SODIUM 40 MG: 40 TABLET, DELAYED RELEASE ORAL at 05:21

## 2024-11-10 RX ADMIN — ACETAMINOPHEN 650 MG: 325 TABLET ORAL at 21:44

## 2024-11-10 RX ADMIN — OXYCODONE 5 MG: 5 TABLET ORAL at 21:45

## 2024-11-10 RX ADMIN — MAGNESIUM HYDROXIDE 15 ML: 1200 LIQUID ORAL at 13:41

## 2024-11-10 RX ADMIN — SULFAMETHOXAZOLE AND TRIMETHOPRIM 1 TABLET: 800; 160 TABLET ORAL at 21:44

## 2024-11-10 RX ADMIN — DICLOFENAC SODIUM 2 G: 10 GEL TOPICAL at 16:18

## 2024-11-10 RX ADMIN — DOCUSATE SODIUM 100 MG: 100 CAPSULE, LIQUID FILLED ORAL at 21:44

## 2024-11-10 RX ADMIN — DICLOFENAC SODIUM 2 G: 10 GEL TOPICAL at 21:45

## 2024-11-10 RX ADMIN — CYCLOBENZAPRINE 10 MG: 10 TABLET, FILM COATED ORAL at 23:38

## 2024-11-10 RX ADMIN — CYCLOBENZAPRINE 10 MG: 10 TABLET, FILM COATED ORAL at 07:50

## 2024-11-10 RX ADMIN — SULFAMETHOXAZOLE AND TRIMETHOPRIM 1 TABLET: 800; 160 TABLET ORAL at 07:49

## 2024-11-10 RX ADMIN — OXYCODONE 5 MG: 5 TABLET ORAL at 13:44

## 2024-11-10 RX ADMIN — Medication 5000 UNITS: at 07:49

## 2024-11-10 RX ADMIN — ACETAMINOPHEN 650 MG: 325 TABLET ORAL at 05:21

## 2024-11-10 RX ADMIN — POLYETHYLENE GLYCOL 3350 17 G: 17 POWDER, FOR SOLUTION ORAL at 07:50

## 2024-11-10 RX ADMIN — CALCIUM 500 MG: 500 TABLET ORAL at 16:18

## 2024-11-10 RX ADMIN — ACETAMINOPHEN 650 MG: 325 TABLET ORAL at 09:56

## 2024-11-10 RX ADMIN — FERROUS SULFATE TAB 325 MG (65 MG ELEMENTAL FE) 325 MG: 325 (65 FE) TAB at 16:18

## 2024-11-10 RX ADMIN — Medication 6 MG: at 21:44

## 2024-11-10 RX ADMIN — ACETAMINOPHEN 650 MG: 325 TABLET ORAL at 16:18

## 2024-11-10 RX ADMIN — CALCIUM 500 MG: 500 TABLET ORAL at 07:50

## 2024-11-10 RX ADMIN — ENOXAPARIN SODIUM 40 MG: 100 INJECTION SUBCUTANEOUS at 07:50

## 2024-11-10 RX ADMIN — FERROUS SULFATE TAB 325 MG (65 MG ELEMENTAL FE) 325 MG: 325 (65 FE) TAB at 07:50

## 2024-11-10 RX ADMIN — Medication 1 TABLET: at 07:49

## 2024-11-10 RX ADMIN — OXYCODONE 10 MG: 5 TABLET ORAL at 09:55

## 2024-11-10 RX ADMIN — Medication 12.5 MG: at 05:21

## 2024-11-10 ASSESSMENT — PAIN SCALES - GENERAL
PAINLEVEL_OUTOF10: 7
PAINLEVEL_OUTOF10: 8
PAINLEVEL_OUTOF10: 5
PAINLEVEL_OUTOF10: 2
PAINLEVEL_OUTOF10: 8
PAINLEVEL_OUTOF10: 7
PAINLEVEL_OUTOF10: 5
PAINLEVEL_OUTOF10: 6
PAINLEVEL_OUTOF10: 8
PAINLEVEL_OUTOF10: 3
PAINLEVEL_OUTOF10: 2

## 2024-11-10 ASSESSMENT — PAIN DESCRIPTION - LOCATION
LOCATION: GROIN
LOCATION: LEG
LOCATION: BACK;LEG
LOCATION: LEG
LOCATION: GROIN
LOCATION: BACK;HIP

## 2024-11-10 ASSESSMENT — PAIN DESCRIPTION - ORIENTATION
ORIENTATION: RIGHT
ORIENTATION: LEFT
ORIENTATION: RIGHT
ORIENTATION: LEFT

## 2024-11-10 ASSESSMENT — PAIN DESCRIPTION - DESCRIPTORS
DESCRIPTORS: ACHING;DULL
DESCRIPTORS: ACHING
DESCRIPTORS: ACHING
DESCRIPTORS: SHARP;PRESSURE
DESCRIPTORS: ACHING

## 2024-11-10 ASSESSMENT — PAIN - FUNCTIONAL ASSESSMENT
PAIN_FUNCTIONAL_ASSESSMENT: ACTIVITIES ARE NOT PREVENTED

## 2024-11-10 ASSESSMENT — PAIN DESCRIPTION - PAIN TYPE
TYPE: ACUTE PAIN
TYPE: ACUTE PAIN

## 2024-11-10 ASSESSMENT — PAIN DESCRIPTION - ONSET
ONSET: ON-GOING
ONSET: ON-GOING

## 2024-11-10 ASSESSMENT — PAIN DESCRIPTION - FREQUENCY
FREQUENCY: INTERMITTENT
FREQUENCY: INTERMITTENT

## 2024-11-10 NOTE — PROGRESS NOTES
Patient: Linda Don  Unit/Bed: 8K-14/014-A  YOB: 1990  MRN: [id number] Acct: [id number]   Admitting Diagnosis: TBI (traumatic brain injury) [S06.9XAA]  Multiple fractures of pelvis with stable disruption of pelvic ring, initial encounter for closed fracture (HCC) [S32.810A]  Admit Date:  11/5/2024  Hospital Day: 5    Assessment:     Principal Problem:    Multiple fractures of pelvis with stable disruption of pelvic ring, initial encounter for closed fracture (HCC)  Active Problems:    Abrasion of knee, bilateral    Abrasion of elbow, right, initial encounter    Abrasion of elbow, left, initial encounter    Closed fracture of multiple pubic rami, left, sequela    Anemia    Traumatic brain injury with brief (less than 1 hour) loss of consciousness    Sprain of anterior talofibular ligament of right ankle    Abrasion of right ankle    Forehead contusion    Acute cystitis without hematuria  Resolved Problems:    * No resolved hospital problems. *      Plan:     The vit D was low so it was replaced.  Remove the INT          Subjective:     Patient has no complaint of CP or SOB..   Medication side effects: none    Scheduled Meds:   Vitamin D  5,000 Units Oral Daily    sulfamethoxazole-trimethoprim  1 tablet Oral 2 times per day    diclofenac sodium  2 g Topical 4x daily    calcium elemental  500 mg Oral BID WC    ferrous sulfate  325 mg Oral BID WC    sodium chloride flush  5-40 mL IntraVENous 2 times per day    acetaminophen  650 mg Oral Q6H    enoxaparin  40 mg SubCUTAneous Daily    pantoprazole  40 mg Oral QAM AC    docusate sodium  100 mg Oral BID    senna  1 tablet Oral Nightly    naloxegol  12.5 mg Oral QAM AC    melatonin  6 mg Oral Nightly    multivitamin  1 tablet Oral Daily     Continuous Infusions:   sodium chloride       PRN Meds:sodium chloride flush, sodium chloride, oxyCODONE **OR** oxyCODONE, cyclobenzaprine, bisacodyl, magnesium hydroxide, acetaminophen, ondansetron, traZODone,

## 2024-11-10 NOTE — CARE COORDINATION
Patient is alert and oriented but seems to be in a subdued mood this morning. She has not had a bowel movement and declined the Dulcolax suppository, though all other prescribed bowel medications were administered. The patient continues to report pain, and pain medications were given as needed. She is currently resting comfortably in bed with no other complaints at this time.

## 2024-11-10 NOTE — PLAN OF CARE
Problem: Discharge Planning  Goal: Discharge to home or other facility with appropriate resources  11/10/2024 1033 by Lucita Mohr RN  Outcome: Progressing  Flowsheets (Taken 11/10/2024 1033)  Discharge to home or other facility with appropriate resources:   Identify barriers to discharge with patient and caregiver   Arrange for needed discharge resources and transportation as appropriate   Identify discharge learning needs (meds, wound care, etc)     Problem: Pain  Goal: Verbalizes/displays adequate comfort level or baseline comfort level  11/10/2024 1033 by Lucita Mohr RN  Outcome: Progressing  Flowsheets (Taken 11/10/2024 1033)  Verbalizes/displays adequate comfort level or baseline comfort level:   Assess pain using appropriate pain scale   Encourage patient to monitor pain and request assistance   Administer analgesics based on type and severity of pain and evaluate response   Implement non-pharmacological measures as appropriate and evaluate response     Problem: Skin/Tissue Integrity  Goal: Absence of new skin breakdown  Description: 1.  Monitor for areas of redness and/or skin breakdown  2.  Assess vascular access sites hourly  3.  Every 4-6 hours minimum:  Change oxygen saturation probe site  4.  Every 4-6 hours:  If on nasal continuous positive airway pressure, respiratory therapy assess nares and determine need for appliance change or resting period.  11/10/2024 1033 by Lucita Mohr RN  Outcome: Progressing     Problem: Respiratory - Adult  Goal: Achieves optimal ventilation and oxygenation  11/10/2024 1033 by Lucita Mohr RN  Outcome: Progressing  Flowsheets (Taken 11/10/2024 1033)  Achieves optimal ventilation and oxygenation:   Assess for changes in respiratory status   Assess for changes in mentation and behavior     Problem: Cardiovascular - Adult  Goal: Maintains optimal cardiac output and hemodynamic stability  11/10/2024 1033 by Lucita Mohr RN  Outcome: Progressing  Flowsheets  (Taken 11/10/2024 1033)  Maintains optimal cardiac output and hemodynamic stability: Monitor blood pressure and heart rate     Problem: Skin/Tissue Integrity - Adult  Goal: Skin integrity remains intact  11/10/2024 1033 by Lucita Mohr RN  Outcome: Progressing  Flowsheets (Taken 11/10/2024 1033)  Skin Integrity Remains Intact:   Monitor for areas of redness and/or skin breakdown   Assess vascular access sites hourly     Problem: Skin/Tissue Integrity - Adult  Goal: Incisions, wounds, or drain sites healing without S/S of infection  11/10/2024 1033 by Lucita Mohr RN  Outcome: Progressing  Flowsheets (Taken 11/10/2024 1033)  Incisions, Wounds, or Drain Sites Healing Without Sign and Symptoms of Infection:   ADMISSION and DAILY: Assess and document risk factors for pressure ulcer development   TWICE DAILY: Assess and document skin integrity     Problem: Musculoskeletal - Adult  Goal: Return mobility to safest level of function  11/10/2024 1033 by Lucita Mohr RN  Outcome: Progressing  Flowsheets (Taken 11/10/2024 1033)  Return Mobility to Safest Level of Function:   Assess patient stability and activity tolerance for standing, transferring and ambulating with or without assistive devices   Assist with transfers and ambulation using safe patient handling equipment as needed    Problem: Musculoskeletal - Adult  Goal: Maintain proper alignment of affected body part  11/10/2024 1033 by Lucita Mohr RN  Outcome: Progressing  Flowsheets (Taken 11/10/2024 1033)  Maintain proper alignment of affected body part: Support and protect limb and body alignment per provider's orders

## 2024-11-10 NOTE — PLAN OF CARE
Problem: Discharge Planning  Goal: Discharge to home or other facility with appropriate resources  11/9/2024 2158 by Kasie Medrano LPN  Outcome: Progressing  Flowsheets (Taken 11/9/2024 1944)  Discharge to home or other facility with appropriate resources:   Identify barriers to discharge with patient and caregiver   Arrange for needed discharge resources and transportation as appropriate   Refer to discharge planning if patient needs post-hospital services based on physician order or complex needs related to functional status, cognitive ability or social support system     Problem: Pain  Goal: Verbalizes/displays adequate comfort level or baseline comfort level  11/9/2024 2158 by Kasie Medrano LPN  Outcome: Progressing     Problem: Skin/Tissue Integrity  Goal: Absence of new skin breakdown  Description: 1.  Monitor for areas of redness and/or skin breakdown  2.  Assess vascular access sites hourly  3.  Every 4-6 hours minimum:  Change oxygen saturation probe site  4.  Every 4-6 hours:  If on nasal continuous positive airway pressure, respiratory therapy assess nares and determine need for appliance change or resting period.  11/9/2024 2158 by Kasie Medrano LPN  Outcome: Progressing     Problem: Respiratory - Adult  Goal: Achieves optimal ventilation and oxygenation  11/9/2024 2158 by Kasie Medrano LPN  Outcome: Progressing  Flowsheets (Taken 11/9/2024 1944)  Achieves optimal ventilation and oxygenation:   Assess for changes in respiratory status   Assess for changes in mentation and behavior   Encourage broncho-pulmonary hygiene including cough, deep breathe, incentive spirometry   Assess the need for suctioning and aspirate as needed   Assess and instruct to report shortness of breath or any respiratory difficulty     Problem: Cardiovascular - Adult  Goal: Maintains optimal cardiac output and hemodynamic stability  11/9/2024 2158 by Kasie Medrano LPN  Outcome:  family use of appropriate assistive device and precautions (e.g. spinal or hip dislocation precautions)     Problem: Gastrointestinal - Adult  Goal: Minimal or absence of nausea and vomiting  11/9/2024 2158 by Kasie Medrano LPN  Outcome: Progressing  Flowsheets (Taken 11/9/2024 1944)  Minimal or absence of nausea and vomiting:   Administer IV fluids as ordered to ensure adequate hydration   Maintain NPO status until nausea and vomiting are resolved   Administer ordered antiemetic medications as needed   Advance diet as tolerated, if ordered     Problem: Gastrointestinal - Adult  Goal: Maintains or returns to baseline bowel function  11/9/2024 2158 by Kasie Medrano LPN  Outcome: Progressing  Flowsheets (Taken 11/9/2024 1944)  Maintains or returns to baseline bowel function:   Assess bowel function   Encourage oral fluids to ensure adequate hydration   Encourage mobilization and activity     Problem: Genitourinary - Adult  Goal: Absence of urinary retention  11/9/2024 2158 by Kasie Medrano LPN  Outcome: Progressing  Flowsheets (Taken 11/9/2024 1944)  Absence of urinary retention:   Assess patient’s ability to void and empty bladder   Monitor intake/output and perform bladder scan as needed     Problem: Infection - Adult  Goal: Absence of infection at discharge  11/9/2024 2158 by Kasie Medrano LPN  Outcome: Progressing  Flowsheets (Taken 11/9/2024 1944)  Absence of infection at discharge:   Assess and monitor for signs and symptoms of infection   Monitor lab/diagnostic results   Monitor all insertion sites i.e., indwelling lines, tubes and drains   Monitor endotracheal (as able) and nasal secretions for changes in amount and color     Problem: Infection - Adult  Goal: Absence of infection during hospitalization  11/9/2024 2158 by Kasie Medrano LPN  Outcome: Progressing  Flowsheets (Taken 11/9/2024 1944)  Absence of infection during hospitalization:   Assess and monitor

## 2024-11-10 NOTE — CARE COORDINATION
Pt has refused both the Voltaren gel as well as the melatonin this shift. Pt advised she doesn't like the \"cold feeling that the gel gives after applying it.\" Pt denied the melatonin due to guests still visiting. Pt comfortable with call light in reach.

## 2024-11-11 LAB
ANION GAP SERPL CALC-SCNC: 8 MEQ/L (ref 8–16)
BASOPHILS ABSOLUTE: 0 THOU/MM3 (ref 0–0.1)
BASOPHILS NFR BLD AUTO: 0.3 %
BUN SERPL-MCNC: 12 MG/DL (ref 7–22)
CALCIUM SERPL-MCNC: 8.6 MG/DL (ref 8.5–10.5)
CHLORIDE SERPL-SCNC: 106 MEQ/L (ref 98–111)
CO2 SERPL-SCNC: 22 MEQ/L (ref 23–33)
CREAT SERPL-MCNC: 0.8 MG/DL (ref 0.4–1.2)
DEPRECATED RDW RBC AUTO: 54.1 FL (ref 35–45)
EOSINOPHIL NFR BLD AUTO: 4.6 %
EOSINOPHILS ABSOLUTE: 0.3 THOU/MM3 (ref 0–0.4)
ERYTHROCYTE [DISTWIDTH] IN BLOOD BY AUTOMATED COUNT: 15.8 % (ref 11.5–14.5)
GFR SERPL CREATININE-BSD FRML MDRD: > 90 ML/MIN/1.73M2
GLUCOSE SERPL-MCNC: 91 MG/DL (ref 70–108)
HCT VFR BLD AUTO: 29.6 % (ref 37–47)
HGB BLD-MCNC: 9.3 GM/DL (ref 12–16)
IMM GRANULOCYTES # BLD AUTO: 0.08 THOU/MM3 (ref 0–0.07)
IMM GRANULOCYTES NFR BLD AUTO: 1.1 %
LYMPHOCYTES ABSOLUTE: 1.9 THOU/MM3 (ref 1–4.8)
LYMPHOCYTES NFR BLD AUTO: 25 %
MCH RBC QN AUTO: 29.7 PG (ref 26–33)
MCHC RBC AUTO-ENTMCNC: 31.4 GM/DL (ref 32.2–35.5)
MCV RBC AUTO: 94.6 FL (ref 81–99)
MONOCYTES ABSOLUTE: 0.6 THOU/MM3 (ref 0.4–1.3)
MONOCYTES NFR BLD AUTO: 8.4 %
NEUTROPHILS ABSOLUTE: 4.6 THOU/MM3 (ref 1.8–7.7)
NEUTROPHILS NFR BLD AUTO: 60.6 %
NRBC BLD AUTO-RTO: 0 /100 WBC
PLATELET # BLD AUTO: 350 THOU/MM3 (ref 130–400)
PMV BLD AUTO: 10.5 FL (ref 9.4–12.4)
POTASSIUM SERPL-SCNC: 5 MEQ/L (ref 3.5–5.2)
RBC # BLD AUTO: 3.13 MILL/MM3 (ref 4.2–5.4)
SODIUM SERPL-SCNC: 136 MEQ/L (ref 135–145)
WBC # BLD AUTO: 7.6 THOU/MM3 (ref 4.8–10.8)

## 2024-11-11 PROCEDURE — 97110 THERAPEUTIC EXERCISES: CPT

## 2024-11-11 PROCEDURE — 97116 GAIT TRAINING THERAPY: CPT

## 2024-11-11 PROCEDURE — 6370000000 HC RX 637 (ALT 250 FOR IP): Performed by: PHYSICAL MEDICINE & REHABILITATION

## 2024-11-11 PROCEDURE — 6370000000 HC RX 637 (ALT 250 FOR IP): Performed by: FAMILY MEDICINE

## 2024-11-11 PROCEDURE — 6360000002 HC RX W HCPCS: Performed by: PHYSICAL MEDICINE & REHABILITATION

## 2024-11-11 PROCEDURE — 36415 COLL VENOUS BLD VENIPUNCTURE: CPT

## 2024-11-11 PROCEDURE — 97530 THERAPEUTIC ACTIVITIES: CPT

## 2024-11-11 PROCEDURE — 99232 SBSQ HOSP IP/OBS MODERATE 35: CPT | Performed by: PHYSICAL MEDICINE & REHABILITATION

## 2024-11-11 PROCEDURE — 97535 SELF CARE MNGMENT TRAINING: CPT

## 2024-11-11 PROCEDURE — 80048 BASIC METABOLIC PNL TOTAL CA: CPT

## 2024-11-11 PROCEDURE — 85025 COMPLETE CBC W/AUTO DIFF WBC: CPT

## 2024-11-11 PROCEDURE — 1180000000 HC REHAB R&B

## 2024-11-11 RX ADMIN — FERROUS SULFATE TAB 325 MG (65 MG ELEMENTAL FE) 325 MG: 325 (65 FE) TAB at 08:44

## 2024-11-11 RX ADMIN — FERROUS SULFATE TAB 325 MG (65 MG ELEMENTAL FE) 325 MG: 325 (65 FE) TAB at 18:20

## 2024-11-11 RX ADMIN — CALCIUM 500 MG: 500 TABLET ORAL at 18:20

## 2024-11-11 RX ADMIN — Medication 6 MG: at 20:00

## 2024-11-11 RX ADMIN — PANTOPRAZOLE SODIUM 40 MG: 40 TABLET, DELAYED RELEASE ORAL at 06:08

## 2024-11-11 RX ADMIN — ACETAMINOPHEN 650 MG: 325 TABLET ORAL at 18:29

## 2024-11-11 RX ADMIN — ACETAMINOPHEN 650 MG: 325 TABLET ORAL at 12:36

## 2024-11-11 RX ADMIN — ACETAMINOPHEN 650 MG: 325 TABLET ORAL at 06:06

## 2024-11-11 RX ADMIN — Medication 12.5 MG: at 06:09

## 2024-11-11 RX ADMIN — ENOXAPARIN SODIUM 40 MG: 100 INJECTION SUBCUTANEOUS at 08:49

## 2024-11-11 RX ADMIN — CYCLOBENZAPRINE 10 MG: 10 TABLET, FILM COATED ORAL at 15:07

## 2024-11-11 RX ADMIN — POLYETHYLENE GLYCOL 3350 17 G: 17 POWDER, FOR SOLUTION ORAL at 06:10

## 2024-11-11 RX ADMIN — DICLOFENAC SODIUM 2 G: 10 GEL TOPICAL at 08:44

## 2024-11-11 RX ADMIN — OXYCODONE 10 MG: 5 TABLET ORAL at 06:13

## 2024-11-11 RX ADMIN — OXYCODONE 10 MG: 5 TABLET ORAL at 12:38

## 2024-11-11 RX ADMIN — SULFAMETHOXAZOLE AND TRIMETHOPRIM 1 TABLET: 800; 160 TABLET ORAL at 19:59

## 2024-11-11 RX ADMIN — TRAZODONE HYDROCHLORIDE 50 MG: 50 TABLET ORAL at 21:36

## 2024-11-11 RX ADMIN — DICLOFENAC SODIUM 2 G: 10 GEL TOPICAL at 12:35

## 2024-11-11 RX ADMIN — Medication 1 TABLET: at 08:44

## 2024-11-11 RX ADMIN — ACETAMINOPHEN 650 MG: 325 TABLET ORAL at 21:36

## 2024-11-11 RX ADMIN — SENNOSIDES 8.6 MG: 8.6 TABLET, FILM COATED ORAL at 19:59

## 2024-11-11 RX ADMIN — DOCUSATE SODIUM 100 MG: 100 CAPSULE, LIQUID FILLED ORAL at 19:59

## 2024-11-11 RX ADMIN — SULFAMETHOXAZOLE AND TRIMETHOPRIM 1 TABLET: 800; 160 TABLET ORAL at 08:44

## 2024-11-11 RX ADMIN — CALCIUM 500 MG: 500 TABLET ORAL at 08:44

## 2024-11-11 RX ADMIN — Medication 5000 UNITS: at 08:43

## 2024-11-11 RX ADMIN — DOCUSATE SODIUM 100 MG: 100 CAPSULE, LIQUID FILLED ORAL at 08:50

## 2024-11-11 ASSESSMENT — PAIN SCALES - GENERAL
PAINLEVEL_OUTOF10: 8
PAINLEVEL_OUTOF10: 9
PAINLEVEL_OUTOF10: 7
PAINLEVEL_OUTOF10: 8
PAINLEVEL_OUTOF10: 6

## 2024-11-11 ASSESSMENT — PAIN DESCRIPTION - LOCATION
LOCATION: PELVIS
LOCATION: HIP
LOCATION: PELVIS
LOCATION: GROIN;BACK

## 2024-11-11 ASSESSMENT — ENCOUNTER SYMPTOMS
DIARRHEA: 0
SHORTNESS OF BREATH: 0
NAUSEA: 0
COUGH: 0
CONSTIPATION: 0
ABDOMINAL PAIN: 0
RHINORRHEA: 0
SORE THROAT: 0
VOMITING: 0
TROUBLE SWALLOWING: 0
WHEEZING: 0
BACK PAIN: 1

## 2024-11-11 ASSESSMENT — PAIN DESCRIPTION - ORIENTATION
ORIENTATION: LEFT
ORIENTATION: RIGHT;LEFT
ORIENTATION: RIGHT;LEFT
ORIENTATION: INNER
ORIENTATION: RIGHT;LEFT
ORIENTATION: LEFT

## 2024-11-11 ASSESSMENT — PAIN DESCRIPTION - DESCRIPTORS
DESCRIPTORS: PRESSURE;SQUEEZING;TIGHTNESS
DESCRIPTORS: ACHING
DESCRIPTORS: PRESSURE;SQUEEZING;TIGHTNESS
DESCRIPTORS: BURNING;TIGHTNESS

## 2024-11-11 NOTE — PROGRESS NOTES
Physical Medicine & Rehabilitation Progress Note    Chief Complaint:  Fall on stairs resulting pain at lower back and left groin     Subjective:    Linda Don is a 34 y.o. right-handed -American female with no significant past medical history, was admitted on 11/5/2024 for intensive inpatient management of impairment & disability secondary to injuries sustained in a fall accident on the stair at home on 11/2/2024.     The patient says she stayed over the night in a casino and returned home on early 11/2/2024 morning.  While she was walking up the stairs to reach her condo apartment on the second floor, her foot slipped causing her to lose balance because of slippery wooden floor with presence of her sock.  She then fell down to the bottom of the stair (approximately 12 steps).  She says her forehead hit something and she blacked out briefly.  Initially she feels severe pain at her lower back, right knee and right ankle.  When she tried to get up using her left lower extremity, she felt severe pain at her left groin.  Her crying and screaming woke her boyfriend up.  After she calmed down, she asked her boyfriend to bring her to emergency room.  Therefore her boyfriend brought her to [facility] ER for evaluation.  CT of abdomen and pelvis, and lumbar spine were performed at [facility] ER.  She was found to have significant pelvis fracture.  The patient then was transferred to [facility] for further care.  Images of abdomen/pelvis and the lumbar spine CT from [facility] were reviewed by radiologist.  She was found to have nondisplaced right iliac bone fracture superiorly, posteriorly and medially, nondisplaced left superior pubic rami's comminuted fracture, and nondisplaced acute left inferior pubic ramus fracture.  Because of multiple areas of skin abrasion wounds, additional x-ray of chest, left femur, right knee, left tibia/fibula, left wrist were done at [facility]

## 2024-11-11 NOTE — PROGRESS NOTES
Physical Medicine & Rehabilitation Progress Note    Chief Complaint:  Fall on stairs resulting pain at lower back and left groin     Subjective:    Linda Don is a 34 y.o. right-handed -American female with no significant past medical history, was admitted on 11/5/2024 for intensive inpatient management of impairment & disability secondary to injuries sustained in a fall accident on the stair at home on 11/2/2024.     The patient says she stayed over the night in a casino and returned home on early 11/2/2024 morning.  While she was walking up the stairs to reach her condo apartment on the second floor, her foot slipped causing her to lose balance because of slippery wooden floor with presence of her sock.  She then fell down to the bottom of the stair (approximately 12 steps).  She says her forehead hit something and she blacked out briefly.  Initially she feels severe pain at her lower back, right knee and right ankle.  When she tried to get up using her left lower extremity, she felt severe pain at her left groin.  Her crying and screaming woke her boyfriend up.  After she calmed down, she asked her boyfriend to bring her to emergency room.  Therefore her boyfriend brought her to [facility] ER for evaluation.  CT of abdomen and pelvis, and lumbar spine were performed at [facility] ER.  She was found to have significant pelvis fracture.  The patient then was transferred to [facility] for further care.  Images of abdomen/pelvis and the lumbar spine CT from [facility] were reviewed by radiologist.  She was found to have nondisplaced right iliac bone fracture superiorly, posteriorly and medially, nondisplaced left superior pubic rami's comminuted fracture, and nondisplaced acute left inferior pubic ramus fracture.  Because of multiple areas of skin abrasion wounds, additional x-ray of chest, left femur, right knee, left tibia/fibula, left wrist were done at [facility]

## 2024-11-11 NOTE — PROGRESS NOTES
[facility]  Occupational Therapy  Daily Note    Discharge Recommendations: Home with Home Exercise Program  Equipment Recommendations:   Pt does not own any equiptment at this time; recommend shower chair and grab bars around toilet and in shower    Time In: 1100  Time Out: 1230  Timed Code Treatment Minutes: 90 Minutes  Minutes: 90          Date: 2024  Patient Name: Linda Don,   Gender: female      Room: [address] [place] [address]  MRN: [id number]  : 1990  (34 y.o.)  Referring Practitioner: Ignacio Mota MD  Diagnosis: Multiple fractures of pelvis with stable disruption of pelvic ring, initial encounter for closed fracture (HCC)  Additional Pertinent Hx: Linda Don is a 34 y.o. right-handed -American female with no significant past medical history, was admitted on 2024 for intensive inpatient management of impairment & disability secondary to injuries sustained in a fall accident on the stair at home on 2024. The patient says she stayed over the night in a casino and returned home on early 2024 morning. While she was walking up the stairs to reach her condo apartment on the second floor, her foot slipped causing her to lose balance because of slippery wooden floor with presence of her sock.  She then fell down to the bottom of the stair (approximately 12 steps).  She says her forehead hit something and she blacked out briefly.  Initially she feels severe pain at her lower back, right knee and right ankle.  When she tried to get up using her left lower extremity, she felt severe pain at her left groin.  Her crying and screaming woke her boyfriend up.  After she calmed down, she asked her boyfriend to bring her to emergency room.  Therefore her boyfriend brought her to [facility] ER for evaluation.  CT of abdomen and pelvis, and lumbar spine were performed at [facility] ER.  She was found to have significant  cooking and takes care of finances, and shopping.    Receives Help From: Family  ADL Assistance: Independent  Homemaking Assistance: Independent  Ambulation Assistance: Independent  Transfer Assistance: Independent    Active : Yes  Mode of Transportation: SUV  Type of Occupation: CNA  Leisure & Hobbies: Enjoys night walks, going out to dinner with family, listening to music, exploring new places  Additional Comments: Patient reports she was independent prior to admission. Patient reports her boyfriend works during the day and reports his shop is under the condo. Patient reports she really does not have any other support.    SUBJECTIVE: Patient pleasant and cooperative, agreeable to OT.     PAIN: 7/10: pelvis     Vitals: Vitals not assessed per clinical judgement, see nursing flowsheet    COGNITION: WNL    ADL:   Footwear Management: with set-up.  Donning bilateral shoes .    IADL:   Community Re-Integration:   Pt completed IADL task within grocery store with a pre fabricated list. Pt completed functinoal mobility within store with grocery cart and supervision for balance. Pt required to reach across graded planes with BUE's to retrieve various items and place into cart to challenge standing balance during BUE release.  Pt required no VC for safety throughout, and demo WFL balance and was able to ambulate away from cart to simulate true grocery store retrieval. Following item retrieval, pt was able to ambulate cart to car and place all groceries in back of car, as well as education on management of RW and how to fold and place in back of car during community tasks. Following placing items in car, practiced bringing grocery items to therapy apartment (including mgmt of porch step, mgmt of door) with education on RW safety and how to transport safely, and pt was able to place groceries in various functional areas in therapy apartment to challenge functional reach, standing tolerance and balance as well as to  improve safety with home entry/exit for community reintegration. To further challenge activity tolerance and balance, pt was then able to return groceries from apt back to market. Overall activity tolerance > 30 minutes with  x1 seated rest break, longest standing bout ~ 20 min before sitting.    BALANCE:  Sitting Balance:  Modified Independent.    Standing Balance: Supervision.      BED MOBILITY:  Not Tested    TRANSFERS:  Sit to Stand:  Supervision. From all surfaces, good hand placement   Stand to Sit: Supervision. Min cues for this transition.    FUNCTIONAL MOBILITY:  Assistive Device: Rolling Walker to none during IADLs (very minimal ambulation without AD, completed with countertop or grocery areas)   Assist Level: Supervision with AD, SBA without AD during IADLs   Distance: To and from therapy gym and To and from therapy apartment & to/from therapy market  Including ascending/descending small porch step, and ramp within grocery store IADLs with good recall of safe technique     ADDITIONAL ACTIVITIES:  Patient engaged into IADL  task of picking up simulated child's toys placed at graded planes and heights, pt as able to retrieve items from floor level via dynamic reach and via reacher. Pt completed task with supervision and an endurance x 9 min with no LOB      Patient completed BUE strengthening exercises with skilled education on HEP: completed x15 reps x1 set with a max resistive band in all joints and all planes in order to improve UE strength and activity tolerance required for BADL routine and toilet / shower transfers. Patient tolerated well, requiring min rest breaks. Patient also required min cues for technique.          Modified Lucina Scale:  Not Applicable    ASSESSMENT:     Activity Tolerance:  Patient tolerance of  treatment: Good treatment tolerance       Plan: Times Per Week: 5x/wk for 90 minutes  Current Treatment Recommendations: Strengthening, Balance training, Functional mobility

## 2024-11-11 NOTE — CARE COORDINATION
Pt c/o right leg/hip pain and left groin pain PRN pain medication and muscle relaxer given.Pt resting with eyes closed at this time.Pt declined the Dulcolax suppository educated pt on importance of proper bowel movements while taking pain meds and not being mobile. Pt havent had a bowel movement since November 6th will continue to encourage pt to take prn meds for constipation. Pt voiced understanding.

## 2024-11-11 NOTE — CARE COORDINATION
Patient ambulated standby assist. Pain managed with oxycodone and scheduled tylenol. Multiple abrasion covered with mepilex bandages. Vitals WNL

## 2024-11-11 NOTE — PLAN OF CARE
Skin integrity remains intact  11/11/2024 0852 by Gloria Quiroz, RN  Outcome: Progressing  Flowsheets (Taken 11/11/2024 0852)  Skin Integrity Remains Intact:   Monitor for areas of redness and/or skin breakdown   Assess vascular access sites hourly   Every 4-6 hours minimum: Change oxygen saturation probe site     Problem: Skin/Tissue Integrity - Adult  Goal: Incisions, wounds, or drain sites healing without S/S of infection  11/11/2024 0852 by Gloria Quiroz, RN  Outcome: Progressing  Flowsheets (Taken 11/11/2024 0852)  Incisions, Wounds, or Drain Sites Healing Without Sign and Symptoms of Infection:   ADMISSION and DAILY: Assess and document risk factors for pressure ulcer development   TWICE DAILY: Assess and document skin integrity   TWICE DAILY: Assess and document dressing/incision, wound bed, drain sites and surrounding tissue     Problem: Musculoskeletal - Adult  Goal: Return mobility to safest level of function  11/11/2024 0852 by Gloria Quiroz, RN  Outcome: Progressing  Flowsheets (Taken 11/11/2024 0852)  Return Mobility to Safest Level of Function:   Assess patient stability and activity tolerance for standing, transferring and ambulating with or without assistive devices   Assist with transfers and ambulation using safe patient handling equipment as needed   Ensure adequate protection for wounds/incisions during mobilization

## 2024-11-11 NOTE — PLAN OF CARE
Problem: Discharge Planning  Goal: Discharge to home or other facility with appropriate resources  Outcome: Progressing  Flowsheets (Taken 11/11/2024 0040)  Discharge to home or other facility with appropriate resources:   Identify barriers to discharge with patient and caregiver   Identify discharge learning needs (meds, wound care, etc)   Arrange for needed discharge resources and transportation as appropriate   Arrange for interpreters to assist at discharge as needed     Problem: Pain  Goal: Verbalizes/displays adequate comfort level or baseline comfort level  Outcome: Progressing  Flowsheets (Taken 11/11/2024 0040)  Verbalizes/displays adequate comfort level or baseline comfort level:   Encourage patient to monitor pain and request assistance   Administer analgesics based on type and severity of pain and evaluate response   Assess pain using appropriate pain scale   Implement non-pharmacological measures as appropriate and evaluate response     Problem: Skin/Tissue Integrity  Goal: Absence of new skin breakdown  Description: 1.  Monitor for areas of redness and/or skin breakdown  2.  Assess vascular access sites hourly  3.  Every 4-6 hours minimum:  Change oxygen saturation probe site  4.  Every 4-6 hours:  If on nasal continuous positive airway pressure, respiratory therapy assess nares and determine need for appliance change or resting period.  Outcome: Progressing     Problem: Respiratory - Adult  Goal: Achieves optimal ventilation and oxygenation  Outcome: Progressing  Flowsheets (Taken 11/11/2024 0040)  Achieves optimal ventilation and oxygenation:   Assess for changes in respiratory status   Assess for changes in mentation and behavior     Problem: Cardiovascular - Adult  Goal: Maintains optimal cardiac output and hemodynamic stability  Outcome: Progressing  Flowsheets (Taken 11/11/2024 0040)  Maintains optimal cardiac output and hemodynamic stability:   Monitor blood pressure and heart rate   Monitor  urine output and notify Licensed Independent Practitioner for values outside of normal range   Assess for signs of decreased cardiac output   Administer fluid and/or volume expanders as ordered     Problem: Skin/Tissue Integrity - Adult  Goal: Skin integrity remains intact  Outcome: Progressing  Flowsheets (Taken 11/11/2024 0040)  Skin Integrity Remains Intact: Monitor for areas of redness and/or skin breakdown     Problem: Skin/Tissue Integrity - Adult  Goal: Incisions, wounds, or drain sites healing without S/S of infection  Outcome: Progressing  Flowsheets (Taken 11/11/2024 0040)  Incisions, Wounds, or Drain Sites Healing Without Sign and Symptoms of Infection:   TWICE DAILY: Assess and document skin integrity   TWICE DAILY: Assess and document dressing/incision, wound bed, drain sites and surrounding tissue     Problem: Musculoskeletal - Adult  Goal: Return mobility to safest level of function  Outcome: Progressing  Flowsheets (Taken 11/11/2024 0040)  Return Mobility to Safest Level of Function:   Assess patient stability and activity tolerance for standing, transferring and ambulating with or without assistive devices   Assist with transfers and ambulation using safe patient handling equipment as needed   Ensure adequate protection for wounds/incisions during mobilization   Obtain physical therapy/occupational therapy consults as needed     Problem: Musculoskeletal - Adult  Goal: Maintain proper alignment of affected body part  Outcome: Progressing  Flowsheets (Taken 11/11/2024 0040)  Maintain proper alignment of affected body part: Support and protect limb and body alignment per provider's orders     Problem: Gastrointestinal - Adult  Goal: Minimal or absence of nausea and vomiting  Outcome: Progressing  Flowsheets (Taken 11/11/2024 0040)  Minimal or absence of nausea and vomiting:   Administer IV fluids as ordered to ensure adequate hydration   Maintain NPO status until nausea and vomiting are resolved    Provide nonpharmacologic comfort measures as appropriate     Problem: Gastrointestinal - Adult  Goal: Maintains or returns to baseline bowel function  Outcome: Progressing  Flowsheets (Taken 11/11/2024 0040)  Maintains or returns to baseline bowel function:   Assess bowel function   Administer IV fluids as ordered to ensure adequate hydration   Encourage oral fluids to ensure adequate hydration   Administer ordered medications as needed   Encourage mobilization and activity     Problem: Genitourinary - Adult  Goal: Absence of urinary retention  Outcome: Progressing  Flowsheets (Taken 11/11/2024 0040)  Absence of urinary retention:   Assess patient’s ability to void and empty bladder   Monitor intake/output and perform bladder scan as needed     Problem: Infection - Adult  Goal: Absence of infection at discharge  Outcome: Progressing  Flowsheets (Taken 11/11/2024 0040)  Absence of infection at discharge:   Assess and monitor for signs and symptoms of infection   Monitor lab/diagnostic results   Administer medications as ordered   Instruct and encourage patient and family to use good hand hygiene technique     Problem: Infection - Adult  Goal: Absence of infection during hospitalization  Outcome: Progressing  Flowsheets (Taken 11/11/2024 0040)  Absence of infection during hospitalization:   Monitor lab/diagnostic results   Assess and monitor for signs and symptoms of infection   Administer medications as ordered   Instruct and encourage patient and family to use good hand hygiene technique     Problem: Safety - Adult  Goal: Free from fall injury  Outcome: Progressing  Flowsheets (Taken 11/11/2024 0040)  Free From Fall Injury: Instruct family/caregiver on patient safety     Problem: Nutrition Deficit:  Goal: Optimize nutritional status  Outcome: Progressing  Flowsheets (Taken 11/11/2024 0040)  Nutrient intake appropriate for improving, restoring, or maintaining nutritional needs:   Assess nutritional status and

## 2024-11-11 NOTE — PROGRESS NOTES
[facility]  Diagnosis List for Inpatient Rehab facility (IRF) - Patient Assessment Instrument (LATRELL)    Patient Name: Linda Don        MRN: [id number]    : 1990  (34 y.o.)  Gender: female     Primary impairment requiring rehabilitation: 14.2 Brain + Multiple Fracture/ Amputation      Etiologic Diagnosis that led to the condition: Mild traumatic brain injury/cerebral concussion with brief loss of consciousness, Multiple fractures of pelvis with stable disruption of pelvic ring     Comorbid conditions affecting rehabilitation:  Status post fall on 2024 at home resulting  Minimally superiomedially distracted right iliac wing nondisplaced fracture   Nondisplaced left superior pubic rami comminuted fracture  Nondisplaced left inferior pubic ramus fracture  Right forehead contusion  Mild traumatic brain injury/cerebral concussion with brief loss of consciousness  Bilateral anterior knees contusion with open skin abrasion  Bilateral posterior elbows contusion with open skin abrasion  Right anterior and lateral ankle sprain with skin open abrasion  Multiple skin abrasion at bilateral upper and lower extremities  Narcotic pain medication induced constipation  Anemia    Leukocytosis likely due to urinary tract infection    ESTELA KISER MD

## 2024-11-11 NOTE — PROGRESS NOTES
[facility]  INPATIENT PHYSICAL THERAPY  DAILY NOTE  [facility] - [id number]      Discharge Recommendations: Continue to assess pending progress  Equipment Recommendations: Yes (Continue to assess pending progress (Patient will likely require RW and possible manual whelchair))               Time In: 0730  Time Out: 0900  Timed Code Treatment Minutes: 90 Minutes  Minutes: 90          Date: 2024  Patient Name: Linda Don,  Gender:  female        MRN: [id number]  : 1990  (34 y.o.)     Referring Practitioner: Ignacio Mota MD  Diagnosis: Multiple fractures of pelvis with stable disruption of pelvic ring, initial encounter for closed fracture (HCC)  Additional Pertinent Hx: Per H&P \"Linda Don is a 34 y.o. right-handed -American female with no significant past medical history, is admitted to the inpatient rehabilitation unit on 2024 for intensive inpatient rehabilitation treatment of impairment and disability secondary to injuries sustained in a fall accident on the stairs at home on 2024.     The patient says she stayed [place] the night in a casino and returned home on early 2024 morning.  While she was walking up the stairs to reach her condo apartment on the second floor, her foot slipped causing her to lose balance because of slippery wooden floor with presence of her sock.  She then fell down to the bottom of the stair (approximately 12 steps).  She says her forehead hit something and she blacked out briefly.  Initially she feels severe pain at her lower back, right knee and right ankle.  When she tried to get up using her left lower extremity, she felt severe pain at her left groin.  Her crying and screaming woke her boyfriend up.  After she calmed down, she asked her boyfriend to bring her to emergency room.  Therefore her boyfriend brought her to [facility] ER for evaluation.  CT of abdomen and pelvis, and lumbar  Meals, Verbal Exercise Instruction    Goals:  Patient Goals : \"Walking, standing time and walking up/down steps without pain\"  Short Term Goals  Time Frame for Short Term Goals: 1 week  Short Term Goal 1: Patient will complete supine < > sit with SBA, head of bed flat, no rails to transfer in and out of bed with decreased difficulty.  Short Term Goal 2: Patient to perform sit<>stand transfers with use of RW and SBA in order to assist with safety with transfers in home.  Short Term Goal 3: Patient to ambulate >/=50 feet with use of RW and SBA in order to assist with ambulation in home.  Short Term Goal 4: Patient to ascend/descend 6 inch step in parallel bars with CGA in order to assist with progression to stair negotiation.  Short Term Goal 5: Patient to perform car transfer with CGA in order to assist with getting to and from appointments.  Long Term Goals  Time Frame for Long Term Goals : 2 weeks from IPR evaluation  Long Term Goal 1: Patient to perform bed mobility supine<>sit with Mod I in order to assist with getting into and out of bed.  Long Term Goal 2: Patient to perform sit<>stand transfers with use of RW and Mod I in order to assist with safety with transfers in home.  Long Term Goal 3: Patient to ambulate >/=150 feet with use of RW and Mod I in order to assist with home mobility.  Long Term Goal 4: Patient to ascend/descend 14 steps with L handrail and Supervision in order to assist with home entry.  Long Term Goal 5: Patient to perform car transfer with Mod I in order to assist with getting to and from appointments.    Following session, patient left in safe position with all fall risk precautions in place.

## 2024-11-12 PROCEDURE — 97110 THERAPEUTIC EXERCISES: CPT

## 2024-11-12 PROCEDURE — 97116 GAIT TRAINING THERAPY: CPT

## 2024-11-12 PROCEDURE — 6370000000 HC RX 637 (ALT 250 FOR IP): Performed by: FAMILY MEDICINE

## 2024-11-12 PROCEDURE — 6370000000 HC RX 637 (ALT 250 FOR IP): Performed by: PHYSICAL MEDICINE & REHABILITATION

## 2024-11-12 PROCEDURE — 97535 SELF CARE MNGMENT TRAINING: CPT

## 2024-11-12 PROCEDURE — 97530 THERAPEUTIC ACTIVITIES: CPT

## 2024-11-12 PROCEDURE — 6360000002 HC RX W HCPCS: Performed by: PHYSICAL MEDICINE & REHABILITATION

## 2024-11-12 PROCEDURE — 99232 SBSQ HOSP IP/OBS MODERATE 35: CPT | Performed by: PHYSICAL MEDICINE & REHABILITATION

## 2024-11-12 PROCEDURE — 1180000000 HC REHAB R&B

## 2024-11-12 RX ADMIN — CYCLOBENZAPRINE 10 MG: 10 TABLET, FILM COATED ORAL at 21:34

## 2024-11-12 RX ADMIN — PANTOPRAZOLE SODIUM 40 MG: 40 TABLET, DELAYED RELEASE ORAL at 05:59

## 2024-11-12 RX ADMIN — CALCIUM 500 MG: 500 TABLET ORAL at 16:46

## 2024-11-12 RX ADMIN — DICLOFENAC SODIUM 2 G: 10 GEL TOPICAL at 16:46

## 2024-11-12 RX ADMIN — CALCIUM 500 MG: 500 TABLET ORAL at 09:04

## 2024-11-12 RX ADMIN — Medication 12.5 MG: at 05:59

## 2024-11-12 RX ADMIN — DOCUSATE SODIUM 100 MG: 100 CAPSULE, LIQUID FILLED ORAL at 09:09

## 2024-11-12 RX ADMIN — Medication 6 MG: at 21:35

## 2024-11-12 RX ADMIN — SULFAMETHOXAZOLE AND TRIMETHOPRIM 1 TABLET: 800; 160 TABLET ORAL at 21:36

## 2024-11-12 RX ADMIN — Medication 1 TABLET: at 09:04

## 2024-11-12 RX ADMIN — ACETAMINOPHEN 650 MG: 325 TABLET ORAL at 21:34

## 2024-11-12 RX ADMIN — SULFAMETHOXAZOLE AND TRIMETHOPRIM 1 TABLET: 800; 160 TABLET ORAL at 09:04

## 2024-11-12 RX ADMIN — DICLOFENAC SODIUM 2 G: 10 GEL TOPICAL at 13:15

## 2024-11-12 RX ADMIN — OXYCODONE 10 MG: 5 TABLET ORAL at 21:34

## 2024-11-12 RX ADMIN — DICLOFENAC SODIUM 2 G: 10 GEL TOPICAL at 21:33

## 2024-11-12 RX ADMIN — FERROUS SULFATE TAB 325 MG (65 MG ELEMENTAL FE) 325 MG: 325 (65 FE) TAB at 16:46

## 2024-11-12 RX ADMIN — ENOXAPARIN SODIUM 40 MG: 100 INJECTION SUBCUTANEOUS at 09:10

## 2024-11-12 RX ADMIN — ACETAMINOPHEN 650 MG: 325 TABLET ORAL at 05:59

## 2024-11-12 RX ADMIN — ACETAMINOPHEN 650 MG: 325 TABLET ORAL at 13:15

## 2024-11-12 RX ADMIN — OXYCODONE 5 MG: 5 TABLET ORAL at 09:16

## 2024-11-12 RX ADMIN — TRAZODONE HYDROCHLORIDE 50 MG: 50 TABLET ORAL at 21:35

## 2024-11-12 RX ADMIN — FERROUS SULFATE TAB 325 MG (65 MG ELEMENTAL FE) 325 MG: 325 (65 FE) TAB at 09:04

## 2024-11-12 RX ADMIN — ACETAMINOPHEN 650 MG: 325 TABLET ORAL at 16:48

## 2024-11-12 RX ADMIN — Medication 5000 UNITS: at 09:04

## 2024-11-12 ASSESSMENT — ENCOUNTER SYMPTOMS
WHEEZING: 0
SHORTNESS OF BREATH: 0
COUGH: 0
CONSTIPATION: 0
SORE THROAT: 0
BACK PAIN: 1
RHINORRHEA: 0
TROUBLE SWALLOWING: 0
NAUSEA: 0
VOMITING: 0
ABDOMINAL PAIN: 0
DIARRHEA: 0

## 2024-11-12 ASSESSMENT — PAIN DESCRIPTION - LOCATION
LOCATION: BACK
LOCATION: BACK
LOCATION: GROIN;HIP
LOCATION: PELVIS
LOCATION: PELVIS

## 2024-11-12 ASSESSMENT — PAIN SCALES - GENERAL
PAINLEVEL_OUTOF10: 7
PAINLEVEL_OUTOF10: 7
PAINLEVEL_OUTOF10: 5
PAINLEVEL_OUTOF10: 4
PAINLEVEL_OUTOF10: 6
PAINLEVEL_OUTOF10: 4

## 2024-11-12 ASSESSMENT — PAIN - FUNCTIONAL ASSESSMENT
PAIN_FUNCTIONAL_ASSESSMENT: ACTIVITIES ARE NOT PREVENTED

## 2024-11-12 ASSESSMENT — PAIN DESCRIPTION - DESCRIPTORS
DESCRIPTORS: DISCOMFORT;ACHING
DESCRIPTORS: ACHING
DESCRIPTORS: ACHING

## 2024-11-12 ASSESSMENT — PAIN SCALES - WONG BAKER: WONGBAKER_NUMERICALRESPONSE: HURTS A LITTLE BIT

## 2024-11-12 ASSESSMENT — PAIN DESCRIPTION - ORIENTATION
ORIENTATION: INNER
ORIENTATION: RIGHT
ORIENTATION: RIGHT
ORIENTATION: LEFT

## 2024-11-12 NOTE — PLAN OF CARE
Problem: Discharge Planning  Goal: Discharge to home or other facility with appropriate resources  Outcome: Progressing  Flowsheets (Taken 11/12/2024 1334)  Discharge to home or other facility with appropriate resources:   Identify barriers to discharge with patient and caregiver   Arrange for needed discharge resources and transportation as appropriate   Identify discharge learning needs (meds, wound care, etc)     Problem: Pain  Goal: Verbalizes/displays adequate comfort level or baseline comfort level  Outcome: Progressing  Flowsheets (Taken 11/12/2024 1334)  Verbalizes/displays adequate comfort level or baseline comfort level:   Encourage patient to monitor pain and request assistance   Assess pain using appropriate pain scale   Administer analgesics based on type and severity of pain and evaluate response     Problem: Skin/Tissue Integrity  Goal: Absence of new skin breakdown  Outcome: Progressing  Note: Bandages replaced and wounds cleansed with wound cleanser     Problem: Respiratory - Adult  Goal: Achieves optimal ventilation and oxygenation  Outcome: Progressing  Flowsheets (Taken 11/12/2024 1334)  Achieves optimal ventilation and oxygenation:   Assess for changes in respiratory status   Assess for changes in mentation and behavior   Position to facilitate oxygenation and minimize respiratory effort     Problem: Cardiovascular - Adult  Goal: Maintains optimal cardiac output and hemodynamic stability  Outcome: Progressing  Flowsheets (Taken 11/12/2024 1334)  Maintains optimal cardiac output and hemodynamic stability:   Monitor blood pressure and heart rate   Monitor urine output and notify Licensed Independent Practitioner for values outside of normal range   Assess for signs of decreased cardiac output     Problem: Skin/Tissue Integrity - Adult  Goal: Skin integrity remains intact  Outcome: Progressing  Flowsheets (Taken 11/12/2024 1334)  Skin Integrity Remains Intact:   Monitor for areas of redness  and/or skin breakdown   Assess vascular access sites hourly     Problem: Skin/Tissue Integrity - Adult  Goal: Incisions, wounds, or drain sites healing without S/S of infection  Outcome: Progressing  Flowsheets (Taken 11/12/2024 1334)  Incisions, Wounds, or Drain Sites Healing Without Sign and Symptoms of Infection: ADMISSION and DAILY: Assess and document risk factors for pressure ulcer development     Problem: Musculoskeletal - Adult  Goal: Return mobility to safest level of function  Outcome: Progressing  Flowsheets (Taken 11/12/2024 1331)  Return Mobility to Safest Level of Function:   Assess patient stability and activity tolerance for standing, transferring and ambulating with or without assistive devices   Assist with transfers and ambulation using safe patient handling equipment as needed   Ensure adequate protection for wounds/incisions during mobilization

## 2024-11-12 NOTE — PROGRESS NOTES
Physical Medicine & Rehabilitation Progress Note    Chief Complaint:  Fall on stairs resulting pain at lower back and left groin     Subjective:    Linda Don is a 34 y.o. right-handed -American female with no significant past medical history, was admitted on 11/5/2024 for intensive inpatient management of impairment & disability secondary to injuries sustained in a fall accident on the stair at home on 11/2/2024.     The patient says she stayed over the night in a casino and returned home on early 11/2/2024 morning.  While she was walking up the stairs to reach her condo apartment on the second floor, her foot slipped causing her to lose balance because of slippery wooden floor with presence of her sock.  She then fell down to the bottom of the stair (approximately 12 steps).  She says her forehead hit something and she blacked out briefly.  Initially she feels severe pain at her lower back, right knee and right ankle.  When she tried to get up using her left lower extremity, she felt severe pain at her left groin.  Her crying and screaming woke her boyfriend up.  After she calmed down, she asked her boyfriend to bring her to emergency room.  Therefore her boyfriend brought her to [facility] ER for evaluation.  CT of abdomen and pelvis, and lumbar spine were performed at [facility] ER.  She was found to have significant pelvis fracture.  The patient then was transferred to [facility] for further care.  Images of abdomen/pelvis and the lumbar spine CT from [facility] were reviewed by radiologist.  She was found to have nondisplaced right iliac bone fracture superiorly, posteriorly and medially, nondisplaced left superior pubic rami's comminuted fracture, and nondisplaced acute left inferior pubic ramus fracture.  Because of multiple areas of skin abrasion wounds, additional x-ray of chest, left femur, right knee, left tibia/fibula, left wrist were done at [facility]  Decreased Heel Strike Bilaterally, Mild Path Deviations, Increased reliance on assistive device, and Cues for proximity to assistive device     Modified Independent for short distances in room, Supervision for longer distances   Distance: 80'x2; ~225'x2; multiple other  short distances around therapy gym.   Surface: Level Tile and Ramp  Device: Rolling Walker  Gait Deviations: Forward Flexed Posture, Slow Sonia, Decreased Step Length Bilaterally, Decreased Weight Shift Left, Decreased Gait Speed, Decreased Heel Strike Bilaterally, Increased reliance on assistive device, and Cues for proximity to assistive device      Dynamic Gait:   -Patient stepped forward and laterally through gait ladder to work on lateral weight shifting, stability while in SLS and foot clearance bilaterally. Able to complete forward stepping with 1 UE support, double support needed for lateral stepping.   -Completed obstacle course of weaving around cones, stepping up/down on balance pads, tapping pods &cones with BLE's and bending down to  objects from floor (1 UE support on walker).      Stairs:  Stairs: 6\" steps X 8 (x2 trials) using Both hands on left ascending rail and Supervision.  Platform: 6\" platform X 1 using Rolling Walker and Supervision.    Stairs: 6\" steps X 12 using Both hands on left ascending rail (occasionally trying just 1 hand on rail) and Supervision.  Platform: 6\" platform X 1 using Rolling Walker and Modified Independent.     Balance:  Static Standing Balance: Modified Independent  Dynamic Standing Balance: Supervision     -Standing on airrex pad, pt completed dynamic balance activities with medicine ball to challenge core and LE strength and stability.   -Picking up objects from floor with 1 UE support on walker to simulate home environment, able to complete with SBA-Supervision, verbal cues for technique to maintain safety in back and for joint integrity.       OT: Reviewed.    ADL:   EATING:Independent.  no  pin prick sensation at bilateral upper & lower extremities  Motor : normal tone at bilateral upper & lower extremities ; 4-/5 muscle strength at the right hip flexion; 3+/5 muscle strength at the left hip flexion; 4+/5 muscle strength at the left knee extension; 4+/5 to 5/5 muscle strength at the right knee flexion; 4-/5 muscle strength at the left knee flexion; normal 5/5 muscle strength at the rest of bilateral upper & lower extremities  Reflex : 1+ bilateral brachioradialis reflexes; 0 bilateral biceps, bilateral knees reflexes  Pathological Reflex :  No Adam's sign ; no ankle clonus  Gait : Not assessed      Diagnostics:   No results found for this or any previous visit (from the past 24 hour(s)).      Impression:  Status post fall on 11/2/2024 at home resulting  Minimally superiomedially distracted right iliac wing nondisplaced fracture   Nondisplaced left superior pubic rami comminuted fracture  Nondisplaced left inferior pubic ramus fracture  Right forehead contusion  Mild traumatic brain injury/cerebral concussion with brief loss of consciousness  Bilateral anterior knees contusion with open skin abrasion  Bilateral posterior elbows contusion with open skin abrasion  Right anterior and lateral ankle sprain with skin open abrasion  Multiple skin abrasion at bilateral upper and lower extremities  Narcotic pain medication induced constipation  Anemia    Leukocytosis due to Staphylococcus aureus urinary tract infection    The patient's condition remains stable.  She continues experiencing pain at her lower back and leg groin area but the pain intensity is reducing slowly.  She is not complains of pain at her ablation region at the bilateral elbows and bilateral knees.  I think we can start reducing her oxycodone dosage.  Her lower extremities muscle strength is improving.  She is tolerating the intensive rehabilitation treatment given.  Her functional improvement continues to progress slowly.    The patient

## 2024-11-12 NOTE — PLAN OF CARE
Problem: Discharge Planning  Goal: Discharge to home or other facility with appropriate resources  11/11/2024 2307 by Christoph Mohr, RN  Outcome: Progressing     Problem: Pain  Goal: Verbalizes/displays adequate comfort level or baseline comfort level  11/11/2024 2307 by Christoph Mohr, RN  Outcome: Progressing     Problem: Skin/Tissue Integrity  Goal: Absence of new skin breakdown  Description: 1.  Monitor for areas of redness and/or skin breakdown  2.  Assess vascular access sites hourly  3.  Every 4-6 hours minimum:  Change oxygen saturation probe site  4.  Every 4-6 hours:  If on nasal continuous positive airway pressure, respiratory therapy assess nares and determine need for appliance change or resting period.  11/11/2024 2307 by Christoph Mohr, RN  Outcome: Progressing     Problem: Musculoskeletal - Adult  Goal: Return mobility to safest level of function  Outcome: Progressing     Problem: Safety - Adult  Goal: Free from fall injury  Outcome: Progressing

## 2024-11-12 NOTE — PROGRESS NOTES
Patient: Linda Don  Unit/Bed: 8K-14/014-A  YOB: 1990  MRN: [id number] Acct: [id number]   Admitting Diagnosis: TBI (traumatic brain injury) [S06.9XAA]  Multiple fractures of pelvis with stable disruption of pelvic ring, initial encounter for closed fracture (HCC) [S32.810A]  Admit Date:  11/5/2024  Hospital Day: 6    Assessment:     Principal Problem:    Multiple fractures of pelvis with stable disruption of pelvic ring, initial encounter for closed fracture (HCC)  Active Problems:    Abrasion of knee, bilateral    Abrasion of elbow, right, initial encounter    Abrasion of elbow, left, initial encounter    Closed fracture of multiple pubic rami, left, sequela    Anemia    Traumatic brain injury with brief (less than 1 hour) loss of consciousness    Sprain of anterior talofibular ligament of right ankle    Abrasion of right ankle    Forehead contusion    Acute cystitis without hematuria  Resolved Problems:    * No resolved hospital problems. *      Plan:     Continue to follow        Subjective:     Patient has no complaint of CP or SOB..   Medication side effects: none    Scheduled Meds:   Vitamin D  5,000 Units Oral Daily    sulfamethoxazole-trimethoprim  1 tablet Oral 2 times per day    diclofenac sodium  2 g Topical 4x daily    calcium elemental  500 mg Oral BID WC    ferrous sulfate  325 mg Oral BID WC    sodium chloride flush  5-40 mL IntraVENous 2 times per day    acetaminophen  650 mg Oral Q6H    enoxaparin  40 mg SubCUTAneous Daily    pantoprazole  40 mg Oral QAM AC    docusate sodium  100 mg Oral BID    senna  1 tablet Oral Nightly    naloxegol  12.5 mg Oral QAM AC    melatonin  6 mg Oral Nightly    multivitamin  1 tablet Oral Daily     Continuous Infusions:   sodium chloride       PRN Meds:sodium chloride flush, sodium chloride, oxyCODONE **OR** oxyCODONE, cyclobenzaprine, bisacodyl, magnesium hydroxide, acetaminophen, ondansetron, traZODone, polyethylene glycol, diclofenac  sodium    Review of Systems  Pertinent items are noted in HPI.    Objective:     Patient Vitals for the past 8 hrs:   BP Temp Temp src Pulse Resp SpO2   11/11/24 1945 106/65 98.2 °F (36.8 °C) Oral 85 16 98 %     I/O last 3 completed shifts:  In: 1095 [P.O.:1095]  Out: -   No intake/output data recorded.    /65   Pulse 85   Temp 98.2 °F (36.8 °C) (Oral)   Resp 16   Ht 1.575 m (5' 2\")   Wt 58.7 kg (129 lb 6.6 oz)   LMP 10/17/2024 (Approximate)   SpO2 98%   BMI 23.67 kg/m²     General appearance: alert, appears stated age, and cooperative  Head: Normocephalic, without obvious abnormality, atraumatic  Lungs: clear to auscultation bilaterally  Heart: regular rate and rhythm, S1, S2 normal, no murmur, click, rub or gallop  Abdomen: soft, non-tender; bowel sounds normal; no masses,  no organomegaly  Extremities: extremities normal, atraumatic, no cyanosis or edema  Skin: Skin color, texture, turgor normal. No rashes or lesions  Neurologic:  weak    Electronically signed by Giancarlo Kovacs MD on 11/11/2024 at 8:11 PM

## 2024-11-12 NOTE — PROGRESS NOTES
Comprehensive Nutrition Assessment    Type and Reason for Visit:  Reassess    Nutrition Recommendations/Plan:   ONS: Ensure Compact TID -prefers chocolate flavor  Continue current diet   MVI and vitamin D supplementation as ordered   Bowel meds as ordered - reports of constipation earlier in admit      Malnutrition Assessment:  Malnutrition Status:  No malnutrition (11/07/24 1159)    Context:  Acute Illness     Findings of the 6 clinical characteristics of malnutrition:  Energy Intake:  No decrease in energy intake  Weight Loss:  No weight loss     Body Fat Loss:  No body fat loss     Muscle Mass Loss:  No muscle mass loss    Fluid Accumulation:  No fluid accumulation Extremities   Strength:  Not Performed    Nutrition Assessment:     Pt. nutritionally compromised AEB admit d/t fall w/multiple fractures. At risk for further nutrition compromise r/t increased nutrient needs for healing multiple fractures and abrasions, right forehead contusion, TBI/concussion with brief LOS and underlying medical condition (Anemia).       Nutrition Related Findings:    Pt. Report/Treatments/Miscellaneous: Patient seen earlier today -reports good appetite and intake of meals, likes strawberry and chocolate Ensure shakes with intake 50-75% of each, declines vanilla flavor ONS, agreeable to trial 4 ounce ONS size (Ensure Compact), denies nausea, reports had abdomen pain earlier in admit but that has resolved    GI Status: BM 11/11, reports had been struggling with constipation earlier in admit but now having BMs   Pertinent Labs: today's glucose 91   Pertinent Meds: oscal, colace, MVI, movantik, protonix, senokot, bactrim, vitamin D, prn oxycodone       Wound Type:  (multiple fractures from falls- right iliac wing fx, left pubic rami fx, left pubic ramus fx, right forehead contusion)       Current Nutrition Intake & Therapies:    Average Meal Intake: 1-25%, % (mostly % per patient)  Average Supplements Intake:  (partial

## 2024-11-12 NOTE — CARE COORDINATION
Patient is alert and oriented. She was able to rest well last night. Mild pain noted on her left leg but bearable. To the bathroom 1 assist via walker. All dressings on her elbows, knees and lower back were dry and intact.

## 2024-11-12 NOTE — PROGRESS NOTES
[facility]  Occupational Therapy  Daily Note    Discharge Recommendations: Home with Home Exercise Program  Equipment Recommendations:   Pt does not own any equiptment at this time; recommend shower chair and grab bars around toilet and in shower      Time In: 06  Time Out: 829  Timed Code Treatment Minutes: 90 Minutes  Minutes: 90      Date: 2024  Patient Name: Linda Don,   Gender: female      Room: [address] [place] [address]  MRN: [id number]  : 1990  (34 y.o.)  Referring Practitioner: Ignacio Mota MD  Diagnosis: Multiple fractures of pelvis with stable disruption of pelvic ring, initial encounter for closed fracture (HCC)  Additional Pertinent Hx: Linda Don is a 34 y.o. right-handed -American female with no significant past medical history, was admitted on 2024 for intensive inpatient management of impairment & disability secondary to injuries sustained in a fall accident on the stair at home on 2024. The patient says she stayed over the night in a casino and returned home on early 2024 morning. While she was walking up the stairs to reach her condo apartment on the second floor, her foot slipped causing her to lose balance because of slippery wooden floor with presence of her sock.  She then fell down to the bottom of the stair (approximately 12 steps).  She says her forehead hit something and she blacked out briefly.  Initially she feels severe pain at her lower back, right knee and right ankle.  When she tried to get up using her left lower extremity, she felt severe pain at her left groin.  Her crying and screaming woke her boyfriend up.  After she calmed down, she asked her boyfriend to bring her to emergency room.  Therefore her boyfriend brought her to [facility] ER for evaluation.  CT of abdomen and pelvis, and lumbar spine were performed at [facility] ER.  She was found to have significant  cooking and takes care of finances, and shopping.    Receives Help From: Family  ADL Assistance: Independent  Homemaking Assistance: Independent  Ambulation Assistance: Independent  Transfer Assistance: Independent    Active : Yes  Mode of Transportation: SUV  Type of Occupation: CNA  Leisure & Hobbies: Enjoys night walks, going out to dinner with family, listening to music, exploring new places  Additional Comments: Patient reports she was independent prior to admission. Patient reports her boyfriend works during the day and reports his shop is under the condo. Patient reports she really does not have any other support.    SUBJECTIVE: Patient pleasant and cooperative, agreeable to OT.     PAIN: 4/10:  pelvis region     Vitals: Vitals not assessed per clinical judgement, see nursing flowsheet    COGNITION: WNL    ADL:   Grooming: Modified Independent.  Standing sinkside without LOB with BUE release to engage in oral care, face care and hand hygiene, x9 minutes.    Bathing: Modified Independent.  In walk-in shower environment, no LOB noted. Demo good safety insight. Pt sat for 80% of shower for pain mgmt / comfort levels, but demo'ed WFL balance during small standing portions. Pt reports she is wanting a shower chair for home. Pt was able to engage in trunk rotation and dynamic reach to manage her own towels / linens within bathing task.   Upper Extremity Dressing: Modified Independent.  Retrieved with RW, donned without issue (bra and shirt)   Lower Extremity Dressing: Modified Independent.  Retrieved with RW, donned without issue.   Footwear Management: Modified Independent.  Doffed and donned without issue.   Toileting: Modified Independent.  During clothing mgmt and hygiene, no LOB   Toilet Transfer: Modified Independent. From ETS  Shower Transfer: Modified Independent.  Walk-in shower environment.    IADL:   : Patient engaged into IADL  task of simulating drying off son and dressing son

## 2024-11-12 NOTE — CARE COORDINATION
Patient is A/Ox4. Therapy made patient MOD I in room.New foam dressings placed on scattered abrasions. Pain managed well with mikal tylenol and oxy.

## 2024-11-12 NOTE — PROGRESS NOTES
[facility]  INPATIENT PHYSICAL THERAPY  DAILY NOTE  [facility] - 8K-14/014-A      Discharge Recommendations: Continue to assess pending progress and Patient would benefit from continued PT at discharge  Equipment Recommendations: Yes (Continue to assess pending progress (Patient will likely require RW and possible manual whelchair))               Time In: 1330  Time Out: 1500  Timed Code Treatment Minutes: 90 Minutes  Minutes: 90          Date: 2024  Patient Name: Linda Don,  Gender:  female        MRN: [id number]  : 1990  (34 y.o.)     Referring Practitioner: Ignacio Mota MD  Diagnosis: Multiple fractures of pelvis with stable disruption of pelvic ring, initial encounter for closed fracture (HCC)  Additional Pertinent Hx: Per H&P \"Linda Don is a 34 y.o. right-handed -American female with no significant past medical history, is admitted to the inpatient rehabilitation unit on 2024 for intensive inpatient rehabilitation treatment of impairment and disability secondary to injuries sustained in a fall accident on the stairs at home on 2024.     The patient says she stayed over the night in a casino and returned home on early 2024 morning.  While she was walking up the stairs to reach her condo apartment on the second floor, her foot slipped causing her to lose balance because of slippery wooden floor with presence of her sock.  She then fell down to the bottom of the stair (approximately 12 steps).  She says her forehead hit something and she blacked out briefly.  Initially she feels severe pain at her lower back, right knee and right ankle.  When she tried to get up using her left lower extremity, she felt severe pain at her left groin.  Her crying and screaming woke her boyfriend up.  After she calmed down, she asked her boyfriend to bring her to emergency room.  Therefore her boyfriend brought her to [facility] ER    -Tapped blaze pods placed on table and on floor to work on dynamic standing balance, lateral weight shift, SLS and foot clearance bilaterally while tapping with BLE's.   -Bending down to floor to  cones to simulate home environment, kept 1 hand on RW while performing. Verbal cues for technique to lessen low back pain.     Exercise:  Patient was guided in 1 set(s) 15-20 reps of exercises to both lower extremities: Glut sets, Seated marches, Seated hamstring curls, Seated heel/toe raises, Long arc quads, Seated isometric hip adduction, Standing marches, and Mini squats.  Exercises were completed for increased independence with functional mobility.    Functional Outcome Measures:  Not completed  Modified [place] Scale:  Not Applicable    ASSESSMENT:  Assessment: Patient progressing toward established goals.  Activity Tolerance:  Patient tolerance of  treatment:Good.  Plan: Current Treatment Recommendations: Strengthening, Balance training, Functional mobility training, Transfer training, Neuromuscular re-education, Endurance training, Home exercise program, Equipment evaluation, education, & procurement, Safety education & training, Patient/Caregiver education & training, Therapeutic activities, Pain management, Gait training, Stair training  General Plan:  (5x/wk 90 min)    Education:  Learners: Patient  Patient Education: Plan of Care, Precautions/Restrictions, Transfers, Reviewed Prior Education, Gait, Stairs, Car Transfers, Health Promotion and Wellness Education, Home Safety Education, - Patient Requires Continued Education    Goals:  Patient Goals : \"Walking, standing time and walking up/down steps without pain\"  Short Term Goals  Time Frame for Short Term Goals: 1 week  Short Term Goal 1: Patient will complete supine < > sit with SBA, head of bed flat, no rails to transfer in and out of bed with decreased difficulty.  Short Term Goal 2: Patient to perform sit<>stand transfers with use of RW and SBA in

## 2024-11-12 NOTE — CARE COORDINATION
Linda Don was evaluated today and a DME order was entered for a wheeled walker because she requires this to successfully complete daily living tasks of eating, bathing, toileting, personal cares, ambulating, grooming, hygiene, dressing upper body, dressing lower body, meal preparation, and taking own medications.  A wheeled walker is necessary due to the patient's unsteady gait, upper body weakness, and inability to  an ambulation device; and she can ambulate only by pushing a walker instead of a lesser assistive device such as a cane, crutch, or standard walker.  The need for this equipment was discussed with the patient and she understands and is in agreement.       Nitza Choi PT, DPT

## 2024-11-13 PROCEDURE — 99232 SBSQ HOSP IP/OBS MODERATE 35: CPT | Performed by: PHYSICAL MEDICINE & REHABILITATION

## 2024-11-13 PROCEDURE — 6360000002 HC RX W HCPCS: Performed by: PHYSICAL MEDICINE & REHABILITATION

## 2024-11-13 PROCEDURE — 97530 THERAPEUTIC ACTIVITIES: CPT

## 2024-11-13 PROCEDURE — 1180000000 HC REHAB R&B

## 2024-11-13 PROCEDURE — 97535 SELF CARE MNGMENT TRAINING: CPT

## 2024-11-13 PROCEDURE — 6370000000 HC RX 637 (ALT 250 FOR IP): Performed by: PHYSICAL MEDICINE & REHABILITATION

## 2024-11-13 PROCEDURE — 97110 THERAPEUTIC EXERCISES: CPT

## 2024-11-13 PROCEDURE — 97116 GAIT TRAINING THERAPY: CPT

## 2024-11-13 PROCEDURE — 6370000000 HC RX 637 (ALT 250 FOR IP): Performed by: FAMILY MEDICINE

## 2024-11-13 RX ORDER — OXYCODONE HYDROCHLORIDE 5 MG/1
5 TABLET ORAL EVERY 4 HOURS PRN
Status: DISCONTINUED | OUTPATIENT
Start: 2024-11-13 | End: 2024-11-16 | Stop reason: HOSPADM

## 2024-11-13 RX ORDER — OXYCODONE HYDROCHLORIDE 5 MG/1
7.5 TABLET ORAL EVERY 4 HOURS PRN
Status: DISCONTINUED | OUTPATIENT
Start: 2024-11-13 | End: 2024-11-16 | Stop reason: HOSPADM

## 2024-11-13 RX ADMIN — Medication 1 TABLET: at 09:05

## 2024-11-13 RX ADMIN — DOCUSATE SODIUM 100 MG: 100 CAPSULE, LIQUID FILLED ORAL at 09:05

## 2024-11-13 RX ADMIN — DICLOFENAC SODIUM 2 G: 10 GEL TOPICAL at 09:05

## 2024-11-13 RX ADMIN — ACETAMINOPHEN 650 MG: 325 TABLET ORAL at 16:01

## 2024-11-13 RX ADMIN — Medication 6 MG: at 20:41

## 2024-11-13 RX ADMIN — ENOXAPARIN SODIUM 40 MG: 100 INJECTION SUBCUTANEOUS at 09:05

## 2024-11-13 RX ADMIN — ACETAMINOPHEN 650 MG: 325 TABLET ORAL at 05:35

## 2024-11-13 RX ADMIN — CALCIUM 500 MG: 500 TABLET ORAL at 16:01

## 2024-11-13 RX ADMIN — Medication 12.5 MG: at 05:35

## 2024-11-13 RX ADMIN — Medication 5000 UNITS: at 09:05

## 2024-11-13 RX ADMIN — DOCUSATE SODIUM 100 MG: 100 CAPSULE, LIQUID FILLED ORAL at 20:41

## 2024-11-13 RX ADMIN — OXYCODONE 10 MG: 5 TABLET ORAL at 07:10

## 2024-11-13 RX ADMIN — ACETAMINOPHEN 650 MG: 325 TABLET ORAL at 09:05

## 2024-11-13 RX ADMIN — PANTOPRAZOLE SODIUM 40 MG: 40 TABLET, DELAYED RELEASE ORAL at 05:35

## 2024-11-13 RX ADMIN — CALCIUM 500 MG: 500 TABLET ORAL at 09:05

## 2024-11-13 RX ADMIN — ACETAMINOPHEN 650 MG: 325 TABLET ORAL at 20:40

## 2024-11-13 RX ADMIN — SENNOSIDES 8.6 MG: 8.6 TABLET, FILM COATED ORAL at 20:40

## 2024-11-13 RX ADMIN — FERROUS SULFATE TAB 325 MG (65 MG ELEMENTAL FE) 325 MG: 325 (65 FE) TAB at 09:05

## 2024-11-13 RX ADMIN — OXYCODONE 7.5 MG: 5 TABLET ORAL at 16:03

## 2024-11-13 RX ADMIN — FERROUS SULFATE TAB 325 MG (65 MG ELEMENTAL FE) 325 MG: 325 (65 FE) TAB at 16:01

## 2024-11-13 RX ADMIN — SULFAMETHOXAZOLE AND TRIMETHOPRIM 1 TABLET: 800; 160 TABLET ORAL at 09:05

## 2024-11-13 ASSESSMENT — PAIN SCALES - GENERAL
PAINLEVEL_OUTOF10: 8
PAINLEVEL_OUTOF10: 4
PAINLEVEL_OUTOF10: 5
PAINLEVEL_OUTOF10: 5
PAINLEVEL_OUTOF10: 9

## 2024-11-13 ASSESSMENT — PAIN DESCRIPTION - ORIENTATION
ORIENTATION: LEFT;MID;RIGHT
ORIENTATION: LEFT;RIGHT
ORIENTATION: LEFT

## 2024-11-13 ASSESSMENT — ENCOUNTER SYMPTOMS
BACK PAIN: 1
DIARRHEA: 0
ABDOMINAL PAIN: 0
TROUBLE SWALLOWING: 0
RHINORRHEA: 0
SHORTNESS OF BREATH: 0
NAUSEA: 0
SORE THROAT: 0
COUGH: 0
WHEEZING: 0
CONSTIPATION: 0
VOMITING: 0

## 2024-11-13 ASSESSMENT — PAIN DESCRIPTION - LOCATION
LOCATION: HIP
LOCATION: GROIN;HIP
LOCATION: HIP

## 2024-11-13 ASSESSMENT — PAIN - FUNCTIONAL ASSESSMENT: PAIN_FUNCTIONAL_ASSESSMENT: ACTIVITIES ARE NOT PREVENTED

## 2024-11-13 ASSESSMENT — PAIN DESCRIPTION - DESCRIPTORS
DESCRIPTORS: ACHING;DISCOMFORT
DESCRIPTORS: ACHING

## 2024-11-13 NOTE — PROGRESS NOTES
[facility]  Occupational Therapy  Progress Note    Discharge Recommendations: Home with HEP  Equipment Recommendations:   Pt does not own any equiptment at this time; Shower chair ordered ([employer]).  Recommend grab bars around toilet and in shower      Time In: 1030  Time Out: 1200  Timed Code Treatment Minutes: 90 Minutes  Minutes: 90          Date: 2024  Patient Name: Linda Don,   Gender: female      Room: [facility]  MRN: [id number]  : 1990  (34 y.o.)  Referring Practitioner: Ignacio Mota MD  Diagnosis: Multiple fractures of pelvis with stable disruption of pelvic ring, initial encounter for closed fracture (HCC)  Additional Pertinent Hx: Linda Don is a 34 y.o. right-handed -American female with no significant past medical history, was admitted on 2024 for intensive inpatient management of impairment & disability secondary to injuries sustained in a fall accident on the stair at home on 2024. The patient says she stayed over the night in a casino and returned home on early 2024 morning. While she was walking up the stairs to reach her condo apartment on the second floor, her foot slipped causing her to lose balance because of slippery wooden floor with presence of her sock.  She then fell down to the bottom of the stair (approximately 12 steps).  She says her forehead hit something and she blacked out briefly.  Initially she feels severe pain at her lower back, right knee and right ankle.  When she tried to get up using her left lower extremity, she felt severe pain at her left groin.  Her crying and screaming woke her boyfriend up.  After she calmed down, she asked her boyfriend to bring her to emergency room.  Therefore her boyfriend brought her to [facility] ER for evaluation.  CT of abdomen and pelvis, and lumbar spine were performed at [facility] ER.  She was found to have

## 2024-11-13 NOTE — PLAN OF CARE
Problem: Discharge Planning  Goal: Discharge to home or other facility with appropriate resources  Outcome: Progressing  Flowsheets (Taken 11/13/2024 1503)  Discharge to home or other facility with appropriate resources:   Identify barriers to discharge with patient and caregiver   Arrange for needed discharge resources and transportation as appropriate     Problem: Pain  Goal: Verbalizes/displays adequate comfort level or baseline comfort level  Outcome: Progressing  Flowsheets (Taken 11/13/2024 1503)  Verbalizes/displays adequate comfort level or baseline comfort level:   Encourage patient to monitor pain and request assistance   Assess pain using appropriate pain scale     Problem: Respiratory - Adult  Goal: Achieves optimal ventilation and oxygenation  Outcome: Progressing  Flowsheets (Taken 11/13/2024 1503)  Achieves optimal ventilation and oxygenation:   Assess for changes in respiratory status   Assess for changes in mentation and behavior     Problem: Cardiovascular - Adult  Goal: Maintains optimal cardiac output and hemodynamic stability  Outcome: Progressing  Flowsheets (Taken 11/13/2024 1503)  Maintains optimal cardiac output and hemodynamic stability: Monitor blood pressure and heart rate     Problem: Skin/Tissue Integrity - Adult  Goal: Skin integrity remains intact  Outcome: Progressing  Flowsheets (Taken 11/13/2024 1503)  Skin Integrity Remains Intact: Monitor for areas of redness and/or skin breakdown     Problem: Skin/Tissue Integrity - Adult  Goal: Incisions, wounds, or drain sites healing without S/S of infection  Outcome: Progressing  Flowsheets (Taken 11/13/2024 1503)  Incisions, Wounds, or Drain Sites Healing Without Sign and Symptoms of Infection: ADMISSION and DAILY: Assess and document risk factors for pressure ulcer development     Problem: Musculoskeletal - Adult  Goal: Return mobility to safest level of function  Outcome: Progressing  Flowsheets (Taken 11/13/2024 1503)  Return Mobility

## 2024-11-13 NOTE — DISCHARGE INSTRUCTIONS
NO DRIVING UNTIL CLEARED BY MARVIN NOVOA MD.    [facility] Options in [facility]  Phone: [phone]    [facility]  Phone: [phone]    [facility]  Phone: [phone]

## 2024-11-13 NOTE — PROGRESS NOTES
[facility]  INPATIENT REHABILITATION  TEAM CONFERENCE NOTE    Conference Date: 2024  Admit Date:  2024  3:24 PM  Patient Name: Linda Don    MRN: [id number]    : 1990  (34 y.o.)  Rehabilitation Admitting Diagnosis:  TBI (traumatic brain injury) [S06.9XAA]  Multiple fractures of pelvis with stable disruption of pelvic ring, initial encounter for closed fracture (HCC) [S32.810A]  Referring Practitioner: Ignacio Mota MD      CASE MANAGEMENT  Current issues/needs regarding patient and family discharge status: Patient continues to be motivated and progressing with therapy. Patient plans to be discharged on Saturday, . SW will follow and maintain involvement in discharge planning.     PHYSICAL THERAPY  Patient overall is making progress with skilled PT treatment and has met 5/5 STG's and /5 LTG's. Patient has progressed to Mod I for bed mobility supine<>sit, Mod I for sit<>stand  transfers and car transfers. Patient has progressed to Mod I for ambulation short distances and Supervision for long distance ambulation with use of RW over level and uneven surfaces. Patient is able to ascend/descend 12 steps with BUE support on one railing with supervision required. Patient performed the TUG in 14.72 seconds indicating she is at risk for falls. Patient continues to demonstrate decreased strength in BLE's and core/trunk resulting in difficulty with functional mobility. Patient overall can continue to benefit from skilled PT treatment in order to assist with BLE strengthening, gait training and dynamic balance for increased functional mobility.  Equipment Needed: Yes (Continue to assess pending progress (Patient will likely require RW and possible manual whelchair))    SPEECH THERAPY  Patient presents with WFL cognitive function characterized by a score of 27/30 on the MOCA. Receptive and expressive language WFL. Speech and voice WFL.  Patient reports hitting head with fall, however,  Limited insight into deficits, Unrealistic expectations, Decreased endurance, Lower extremity weakness, Stairs at home, Skin Care, and Wound Care  Follow up with physiatrist? no  If yes, what timeframe? N/A    Team Members Present at Conference:  :ADAM Pugh  Occupational Therapist:Vicente Watkins MOT, OTR/L [id number]  Physical Therapist:Nitza Choi, [place] [id number]  Speech Therapist:N/A  Nurse:Gloria Quiroz RN  Psychologist: N/A    I approve the established interdisciplinary plan of care as documented within the medical record of Linda Berge. I was present and led the interdisciplinary care conference.    ESTELA KISER MD  Electronically signed by ESTELA KISER MD on 11/14/2024 at 9:56 AM

## 2024-11-13 NOTE — PLAN OF CARE
Problem: Pain  Goal: Verbalizes/displays adequate comfort level or baseline comfort level  11/13/2024 0029 by Naya Hoyos LPN  Outcome: Progressing  Flowsheets (Taken 11/12/2024 1334 by Gloria Quiroz RN)  Verbalizes/displays adequate comfort level or baseline comfort level:   Encourage patient to monitor pain and request assistance   Assess pain using appropriate pain scale   Administer analgesics based on type and severity of pain and evaluate response     Problem: Skin/Tissue Integrity  Goal: Absence of new skin breakdown  Description: 1.  Monitor for areas of redness and/or skin breakdown  2.  Assess vascular access sites hourly  3.  Every 4-6 hours minimum:  Change oxygen saturation probe site  4.  Every 4-6 hours:  If on nasal continuous positive airway pressure, respiratory therapy assess nares and determine need for appliance change or resting period.  11/13/2024 0029 by Naya Hoyos LPN  Outcome: Progressing     Problem: Respiratory - Adult  Goal: Achieves optimal ventilation and oxygenation  11/13/2024 0029 by Naya Hoyos LPN  Outcome: Progressing  Flowsheets (Taken 11/13/2024 0029)  Achieves optimal ventilation and oxygenation:   Assess for changes in respiratory status   Position to facilitate oxygenation and minimize respiratory effort

## 2024-11-13 NOTE — PLAN OF CARE
Problem: Discharge Planning  Goal: Discharge to home or other facility with appropriate resources  11/13/2024 1522 by Autumn Quiros LISW-S  Flowsheets (Taken 11/13/2024 1503 by Eliana Braden, RN)  Discharge to home or other facility with appropriate resources:   Identify barriers to discharge with patient and caregiver   Arrange for needed discharge resources and transportation as appropriate  Note: SW met with patient on this date to discuss discharge planning. Patient is understanding of continuing to be Medicaid pending. Due to this, patient will be going home with no services. Once Medicaid is active, patient will reach out to Dr Ruiz for orders for therapy. Patient is in agreement with this process. SW educated patient on the importance of finding a primary care physician. SW to look into options for patient. SW offered to scheduled family training with patient's boyfriend. Patient does not feel this is needed at this time. SW will follow and maintain involvement in discharge planning.

## 2024-11-13 NOTE — PROGRESS NOTES
[facility]  INPATIENT PHYSICAL THERAPY  PROGRESS NOTE  [facility] - 8K-14/014-A      Discharge Recommendations: Home with Assist as Needed and Home with HEP  Equipment Recommendations:Equipment Needed: Yes (Continue to assess pending progress (Patient will likely require RW and possible manual whelchair))      Time In: 0730  Time Out: 0900  Timed Code Treatment Minutes: 90 Minutes  Minutes: 90          Date: 2024  Patient Name: Linda Don,  Gender:  female        MRN: [id number]  : 1990  (34 y.o.)     Referring Practitioner: Ignacio Mota MD  Diagnosis: Multiple fractures of pelvis with stable disruption of pelvic ring, initial encounter for closed fracture (HCC)  Additional Pertinent Hx: Per H&P \"Linda Don is a 34 y.o. right-handed -American female with no significant past medical history, is admitted to the inpatient rehabilitation unit on 2024 for intensive inpatient rehabilitation treatment of impairment and disability secondary to injuries sustained in a fall accident on the stairs at home on 2024.     The patient says she stayed over the night in a casino and returned home on early 2024 morning.  While she was walking up the stairs to reach her condo apartment on the second floor, her foot slipped causing her to lose balance because of slippery wooden floor with presence of her sock.  She then fell down to the bottom of the stair (approximately 12 steps).  She says her forehead hit something and she blacked out briefly.  Initially she feels severe pain at her lower back, right knee and right ankle.  When she tried to get up using her left lower extremity, she felt severe pain at her left groin.  Her crying and screaming woke her boyfriend up.  After she calmed down, she asked her boyfriend to bring her to emergency room.  Therefore her boyfriend brought her to [facility] ER for evaluation.  CT of abdomen and  with getting to and from appointments.- GOAL MET  Additional Goals?: Yes  Short Term Goal 6: Patient to perform TUG in </=10 seconds in order to assist with functional dynamic balance

## 2024-11-13 NOTE — CARE COORDINATION
No adverse events this shift. Continues to be ad evgeny in room independently. No c/o pain this shift

## 2024-11-14 PROCEDURE — 97535 SELF CARE MNGMENT TRAINING: CPT

## 2024-11-14 PROCEDURE — 97530 THERAPEUTIC ACTIVITIES: CPT

## 2024-11-14 PROCEDURE — 6370000000 HC RX 637 (ALT 250 FOR IP): Performed by: PHYSICAL MEDICINE & REHABILITATION

## 2024-11-14 PROCEDURE — 6360000002 HC RX W HCPCS: Performed by: PHYSICAL MEDICINE & REHABILITATION

## 2024-11-14 PROCEDURE — 6370000000 HC RX 637 (ALT 250 FOR IP): Performed by: FAMILY MEDICINE

## 2024-11-14 PROCEDURE — 1180000000 HC REHAB R&B

## 2024-11-14 PROCEDURE — 97110 THERAPEUTIC EXERCISES: CPT

## 2024-11-14 PROCEDURE — 99232 SBSQ HOSP IP/OBS MODERATE 35: CPT | Performed by: PHYSICAL MEDICINE & REHABILITATION

## 2024-11-14 PROCEDURE — 97116 GAIT TRAINING THERAPY: CPT

## 2024-11-14 RX ADMIN — PANTOPRAZOLE SODIUM 40 MG: 40 TABLET, DELAYED RELEASE ORAL at 05:34

## 2024-11-14 RX ADMIN — DOCUSATE SODIUM 100 MG: 100 CAPSULE, LIQUID FILLED ORAL at 19:48

## 2024-11-14 RX ADMIN — Medication 12.5 MG: at 05:34

## 2024-11-14 RX ADMIN — FERROUS SULFATE TAB 325 MG (65 MG ELEMENTAL FE) 325 MG: 325 (65 FE) TAB at 17:16

## 2024-11-14 RX ADMIN — FERROUS SULFATE TAB 325 MG (65 MG ELEMENTAL FE) 325 MG: 325 (65 FE) TAB at 09:25

## 2024-11-14 RX ADMIN — Medication 1 TABLET: at 09:25

## 2024-11-14 RX ADMIN — Medication 6 MG: at 19:48

## 2024-11-14 RX ADMIN — CALCIUM 500 MG: 500 TABLET ORAL at 17:16

## 2024-11-14 RX ADMIN — ENOXAPARIN SODIUM 40 MG: 100 INJECTION SUBCUTANEOUS at 09:25

## 2024-11-14 RX ADMIN — DOCUSATE SODIUM 100 MG: 100 CAPSULE, LIQUID FILLED ORAL at 09:25

## 2024-11-14 RX ADMIN — DICLOFENAC SODIUM 2 G: 10 GEL TOPICAL at 09:25

## 2024-11-14 RX ADMIN — ACETAMINOPHEN 650 MG: 325 TABLET ORAL at 17:16

## 2024-11-14 RX ADMIN — SENNOSIDES 8.6 MG: 8.6 TABLET, FILM COATED ORAL at 19:48

## 2024-11-14 RX ADMIN — CALCIUM 500 MG: 500 TABLET ORAL at 09:25

## 2024-11-14 RX ADMIN — OXYCODONE 7.5 MG: 5 TABLET ORAL at 07:48

## 2024-11-14 RX ADMIN — CYCLOBENZAPRINE 10 MG: 10 TABLET, FILM COATED ORAL at 17:16

## 2024-11-14 RX ADMIN — OXYCODONE 7.5 MG: 5 TABLET ORAL at 17:16

## 2024-11-14 RX ADMIN — Medication 5000 UNITS: at 09:25

## 2024-11-14 RX ADMIN — ACETAMINOPHEN 650 MG: 325 TABLET ORAL at 09:24

## 2024-11-14 ASSESSMENT — ENCOUNTER SYMPTOMS
SORE THROAT: 0
RHINORRHEA: 0
NAUSEA: 0
DIARRHEA: 0
WHEEZING: 0
CONSTIPATION: 0
COUGH: 0
BACK PAIN: 1
VOMITING: 0
ABDOMINAL PAIN: 0
TROUBLE SWALLOWING: 0
SHORTNESS OF BREATH: 0

## 2024-11-14 ASSESSMENT — PAIN SCALES - GENERAL
PAINLEVEL_OUTOF10: 0
PAINLEVEL_OUTOF10: 0
PAINLEVEL_OUTOF10: 7
PAINLEVEL_OUTOF10: 8
PAINLEVEL_OUTOF10: 7

## 2024-11-14 ASSESSMENT — PAIN DESCRIPTION - ORIENTATION
ORIENTATION: LEFT
ORIENTATION: LEFT
ORIENTATION: LEFT;RIGHT

## 2024-11-14 ASSESSMENT — PAIN DESCRIPTION - LOCATION
LOCATION: HIP
LOCATION: GROIN
LOCATION: HIP;BUTTOCKS

## 2024-11-14 ASSESSMENT — PAIN - FUNCTIONAL ASSESSMENT
PAIN_FUNCTIONAL_ASSESSMENT: ACTIVITIES ARE NOT PREVENTED

## 2024-11-14 ASSESSMENT — PAIN DESCRIPTION - PAIN TYPE: TYPE: ACUTE PAIN

## 2024-11-14 ASSESSMENT — PAIN SCALES - WONG BAKER
WONGBAKER_NUMERICALRESPONSE: NO HURT
WONGBAKER_NUMERICALRESPONSE: NO HURT

## 2024-11-14 ASSESSMENT — PAIN DESCRIPTION - DESCRIPTORS
DESCRIPTORS: ACHING

## 2024-11-14 ASSESSMENT — PAIN DESCRIPTION - FREQUENCY: FREQUENCY: INTERMITTENT

## 2024-11-14 ASSESSMENT — PAIN DESCRIPTION - ONSET: ONSET: ON-GOING

## 2024-11-14 NOTE — CARE COORDINATION
The patient is alert and fully oriented. She is continent and able to go to the bathroom independently using a walker when needed. She reported experiencing pain in her left groin and lower back, along with some tingling in her legs throughout the day, and this has been communicated to Dr. Mota. Outpatient follow up appointment upon discharge with Dr Perry Ruiz is already set up.The patient is currently resting comfortably in a chair, with the call light within reach.

## 2024-11-14 NOTE — PROGRESS NOTES
Physical Medicine & Rehabilitation Progress Note    Chief Complaint:  Fall on stairs resulting pain at lower back and left groin     Subjective:    Linda Don is a 34 y.o. right-handed -American female with no significant past medical history, was admitted on 11/5/2024 for intensive inpatient management of impairment & disability secondary to injuries sustained in a fall accident on the stair at home on 11/2/2024.     The patient says she stayed over the night in a casino and returned home on early 11/2/2024 morning.  While she was walking up the stairs to reach her condo apartment on the second floor, her foot slipped causing her to lose balance because of slippery wooden floor with presence of her sock.  She then fell down to the bottom of the stair (approximately 12 steps).  She says her forehead hit something and she blacked out briefly.  Initially she feels severe pain at her lower back, right knee and right ankle.  When she tried to get up using her left lower extremity, she felt severe pain at her left groin.  Her crying and screaming woke her boyfriend up.  After she calmed down, she asked her boyfriend to bring her to emergency room.  Therefore her boyfriend brought her to [facility] ER for evaluation.  CT of abdomen and pelvis, and lumbar spine were performed at [facility] ER.  She was found to have significant pelvis fracture.  The patient then was transferred to [facility] for further care.  Images of abdomen/pelvis and the lumbar spine CT from [facility] were reviewed by radiologist.  She was found to have nondisplaced right iliac bone fracture superiorly, posteriorly and medially, nondisplaced left superior pubic rami's comminuted fracture, and nondisplaced acute left inferior pubic ramus fracture.  Because of multiple areas of skin abrasion wounds, additional x-ray of chest, left femur, right knee, left tibia/fibula, left wrist were done at [facility]

## 2024-11-14 NOTE — PROGRESS NOTES
[facility]  INPATIENT PHYSICAL THERAPY  DAILY NOTE  [facility] - 8K-14/014-A      Discharge Recommendations: Home with Assist as Needed and HEP  Equipment Recommendations: Yes (Continue to assess pending progress (Patient will likely require RW and possible manual whelchair))               Time In: 0730  Time Out: 0900  Minutes: 90          Date: 2024  Patient Name: Linda Don,  Gender:  female        MRN: [id number]  : 1990  (34 y.o.)     Referring Practitioner: Ignacio Mota MD  Diagnosis: Multiple fractures of pelvis with stable disruption of pelvic ring, initial encounter for closed fracture (HCC)  Additional Pertinent Hx: Per H&P \"Linda Don is a 34 y.o. right-handed -American female with no significant past medical history, is admitted to the inpatient rehabilitation unit on 2024 for intensive inpatient rehabilitation treatment of impairment and disability secondary to injuries sustained in a fall accident on the stairs at home on 2024.     The patient says she stayed over the night in a casino and returned home on early 2024 morning.  While she was walking up the stairs to reach her condo apartment on the second floor, her foot slipped causing her to lose balance because of slippery wooden floor with presence of her sock.  She then fell down to the bottom of the stair (approximately 12 steps).  She says her forehead hit something and she blacked out briefly.  Initially she feels severe pain at her lower back, right knee and right ankle.  When she tried to get up using her left lower extremity, she felt severe pain at her left groin.  Her crying and screaming woke her boyfriend up.  After she calmed down, she asked her boyfriend to bring her to emergency room.  Therefore her boyfriend brought her to [facility] ER for evaluation.  CT of abdomen and pelvis, and lumbar spine were performed at [facility]  ambulation in home.- GOAL MET  Short Term Goal 4: Patient to ascend/descend 6 inch step in parallel bars with CGA in order to assist with progression to stair negotiation.- GOAL MET  Short Term Goal 5: Patient to perform car transfer with CGA in order to assist with getting to and from appointments.- GOAL MET  Additional Goals?: Yes  Short Term Goal 6: Patient to perform TUG in </=10 seconds in order to assist with functional dynamic balance  Long Term Goals  Time Frame for Long Term Goals : 2 weeks from IPR evaluation  Long Term Goal 1: Patient to perform bed mobility supine<>sit with Mod I in order to assist with getting into and out of bed.- GOAL MET  Long Term Goal 2: Patient to perform sit<>stand transfers with use of RW and Mod I in order to assist with safety with transfers in home.- GOAL MET  Long Term Goal 3: Patient to ambulate >/=150 feet with use of RW and Mod I in order to assist with home mobility.-  GOAL MET  Long Term Goal 4: Patient to ascend/descend 14 steps with L handrail and Supervision in order to assist with home entry.- GOAL MET  Long Term Goal 5: Patient to perform car transfer with Mod I in order to assist with getting to and from appointments.- GOAL MET  Additional Goals?: Yes  Long term goal 6: Patient to ascend/descend 14 steps with L handrail and Mod I in order to assist with home entry.    Following session, patient left in safe position with all fall risk precautions in place.

## 2024-11-14 NOTE — CARE COORDINATION
No adverse events this shift. Continues to be ad evgeny in room independently. Pt resting comfortably with call light in reach.

## 2024-11-14 NOTE — PLAN OF CARE
Problem: Discharge Planning  Goal: Discharge to home or other facility with appropriate resources  11/14/2024 1314 by Autumn Quiros LISW-S  Note: Team conference held Thursday, 11/14. Recommendations of the team were explained to the patient by SW. Team is recommending that patient continue on acute inpatient rehab for PT and OT, with expected discharge date of Saturday, 11/16. Following discharge, team is recommending no services at this time. Patient to set up services once Medicaid is active. Patient is aware on how to initiate this process. SW and patient discussed setting up primary care. Patient to use [facility] to start. Care plan reviewed with patient. Patient verbalized understanding of the plan of care and contributed to goal setting. SW to follow and maintain involvement in discharge planning.

## 2024-11-14 NOTE — PLAN OF CARE
Problem: Discharge Planning  Goal: Discharge to home or other facility with appropriate resources  11/14/2024 1232 by Lucita Mohr RN  Outcome: Progressing  Flowsheets (Taken 11/14/2024 1232)  Discharge to home or other facility with appropriate resources:   Identify barriers to discharge with patient and caregiver   Arrange for needed discharge resources and transportation as appropriate    Problem: Pain  Goal: Verbalizes/displays adequate comfort level or baseline comfort level  11/14/2024 1232 by Lucita Mohr RN  Outcome: Progressing  Flowsheets (Taken 11/14/2024 1232)  Verbalizes/displays adequate comfort level or baseline comfort level:   Encourage patient to monitor pain and request assistance   Assess pain using appropriate pain scale   Administer analgesics based on type and severity of pain and evaluate response     Problem: Skin/Tissue Integrity  Goal: Absence of new skin breakdown  Description: 1.  Monitor for areas of redness and/or skin breakdown  2.  Assess vascular access sites hourly  3.  Every 4-6 hours minimum:  Change oxygen saturation probe site  4.  Every 4-6 hours:  If on nasal continuous positive airway pressure, respiratory therapy assess nares and determine need for appliance change or resting period.  11/14/2024 1232 by Lucita Mohr RN  Outcome: Progressing     Problem: Respiratory - Adult  Goal: Achieves optimal ventilation and oxygenation  11/14/2024 1232 by Lucita Mohr RN  Outcome: Progressing  Flowsheets (Taken 11/14/2024 1232)  Achieves optimal ventilation and oxygenation:   Assess for changes in respiratory status   Assess for changes in mentation and behavior   Position to facilitate oxygenation and minimize respiratory effort  11/13/2024 2247 by Kasie Medrano LPN  Outcome: Progressing  Flowsheets  Taken 11/13/2024 2036 by Kasie Medrano LPN  Achieves optimal ventilation and oxygenation:   Assess for changes in respiratory status   Assess for changes  in mentation and behavior   Assess the need for suctioning and aspirate as needed   Assess and instruct to report shortness of breath or any respiratory difficulty   Encourage broncho-pulmonary hygiene including cough, deep breathe, incentive spirometry    Problem: Cardiovascular - Adult  Goal: Maintains optimal cardiac output and hemodynamic stability  11/14/2024 1232 by Lucita Mohr RN  Outcome: Progressing  Flowsheets (Taken 11/14/2024 1232)  Maintains optimal cardiac output and hemodynamic stability:   Monitor blood pressure and heart rate   Monitor urine output and notify Licensed Independent Practitioner for values outside of normal range      Problem: Skin/Tissue Integrity - Adult  Goal: Skin integrity remains intact  11/14/2024 1232 by Lucita Mohr RN  Outcome: Progressing  Flowsheets  Taken 11/14/2024 1232  Skin Integrity Remains Intact:   Monitor for areas of redness and/or skin breakdown   Assess vascular access sites hourly    Problem: Skin/Tissue Integrity - Adult  Goal: Incisions, wounds, or drain sites healing without S/S of infection  11/14/2024 1232 by Lucita Mohr RN  Outcome: Progressing  Flowsheets  Taken 11/14/2024 1232  Incisions, Wounds, or Drain Sites Healing Without Sign and Symptoms of Infection:   ADMISSION and DAILY: Assess and document risk factors for pressure ulcer development   TWICE DAILY: Assess and document skin integrity    Problem: Musculoskeletal - Adult  Goal: Return mobility to safest level of function  11/14/2024 1232 by Lucita Mohr RN  Outcome: Progressing  Flowsheets (Taken 11/14/2024 1232)  Return Mobility to Safest Level of Function:   Assess patient stability and activity tolerance for standing, transferring and ambulating with or without assistive devices   Assist with transfers and ambulation using safe patient handling equipment as needed     Problem: Musculoskeletal - Adult  Goal: Maintain proper alignment of affected body part  11/14/2024 1232 by Onur

## 2024-11-14 NOTE — PLAN OF CARE
Problem: Discharge Planning  Goal: Discharge to home or other facility with appropriate resources  11/13/2024 2247 by Kasie Medrano LPN  Outcome: Progressing     Problem: Pain  Goal: Verbalizes/displays adequate comfort level or baseline comfort level  11/13/2024 2247 by Kasie Medrano LPN  Outcome: Progressing     Problem: Skin/Tissue Integrity  Goal: Absence of new skin breakdown  Description: 1.  Monitor for areas of redness and/or skin breakdown  2.  Assess vascular access sites hourly  3.  Every 4-6 hours minimum:  Change oxygen saturation probe site  4.  Every 4-6 hours:  If on nasal continuous positive airway pressure, respiratory therapy assess nares and determine need for appliance change or resting period.  Outcome: Progressing     Problem: Respiratory - Adult  Goal: Achieves optimal ventilation and oxygenation  11/13/2024 2247 by Kasie Medrano LPN  Outcome: Progressing     Problem: Cardiovascular - Adult  Goal: Maintains optimal cardiac output and hemodynamic stability  11/13/2024 2247 by Kasie Medrano LPN  Outcome: Progressing     Problem: Skin/Tissue Integrity - Adult  Goal: Skin integrity remains intact  11/13/2024 2247 by Kasie Medrano LPN  Outcome: Progressing     Problem: Skin/Tissue Integrity - Adult  Goal: Incisions, wounds, or drain sites healing without S/S of infection  11/13/2024 2247 by Kasie Medrano LPN  Outcome: Progressing     Problem: Musculoskeletal - Adult  Goal: Return mobility to safest level of function  11/13/2024 2247 by Kasie Medrano LPN  Outcome: Progressing     Problem: Musculoskeletal - Adult  Goal: Maintain proper alignment of affected body part  11/13/2024 2247 by Kasie Medrano LPN  Outcome: Progressing     Problem: Gastrointestinal - Adult  Goal: Minimal or absence of nausea and vomiting  Outcome: Progressing     Problem: Gastrointestinal - Adult  Goal: Maintains or returns to baseline bowel

## 2024-11-15 PROCEDURE — 1180000000 HC REHAB R&B

## 2024-11-15 PROCEDURE — 6370000000 HC RX 637 (ALT 250 FOR IP): Performed by: FAMILY MEDICINE

## 2024-11-15 PROCEDURE — 6370000000 HC RX 637 (ALT 250 FOR IP): Performed by: PHYSICAL MEDICINE & REHABILITATION

## 2024-11-15 PROCEDURE — 97530 THERAPEUTIC ACTIVITIES: CPT

## 2024-11-15 PROCEDURE — 97110 THERAPEUTIC EXERCISES: CPT

## 2024-11-15 PROCEDURE — 97116 GAIT TRAINING THERAPY: CPT

## 2024-11-15 PROCEDURE — 99232 SBSQ HOSP IP/OBS MODERATE 35: CPT | Performed by: PHYSICAL MEDICINE & REHABILITATION

## 2024-11-15 PROCEDURE — 6360000002 HC RX W HCPCS: Performed by: PHYSICAL MEDICINE & REHABILITATION

## 2024-11-15 PROCEDURE — 97535 SELF CARE MNGMENT TRAINING: CPT

## 2024-11-15 RX ORDER — OXYCODONE HYDROCHLORIDE 5 MG/1
2.5-5 TABLET ORAL EVERY 6 HOURS PRN
Qty: 28 TABLET | Refills: 0 | Status: SHIPPED | OUTPATIENT
Start: 2024-11-15 | End: 2024-11-22

## 2024-11-15 RX ORDER — FERROUS SULFATE 325(65) MG
325 TABLET ORAL
Qty: 30 TABLET | Refills: 1 | Status: SHIPPED | OUTPATIENT
Start: 2024-11-15

## 2024-11-15 RX ORDER — CALCIUM CARBONATE 500(1250)
500 TABLET ORAL 2 TIMES DAILY WITH MEALS
Qty: 60 TABLET | Refills: 3 | Status: SHIPPED | OUTPATIENT
Start: 2024-11-15

## 2024-11-15 RX ORDER — MULTIVITAMIN WITH IRON
1 TABLET ORAL DAILY
Qty: 30 TABLET | Refills: 3 | Status: SHIPPED | OUTPATIENT
Start: 2024-11-15

## 2024-11-15 RX ORDER — CYCLOBENZAPRINE HCL 10 MG
10 TABLET ORAL 3 TIMES DAILY PRN
Qty: 90 TABLET | Refills: 3 | Status: SHIPPED | OUTPATIENT
Start: 2024-11-15

## 2024-11-15 RX ORDER — SENNOSIDES A AND B 8.6 MG/1
1 TABLET, FILM COATED ORAL NIGHTLY
Qty: 30 TABLET | Refills: 2 | Status: SHIPPED | OUTPATIENT
Start: 2024-11-15

## 2024-11-15 RX ORDER — VITAMIN B COMPLEX
2000 TABLET ORAL DAILY
Qty: 60 TABLET | Refills: 3 | Status: SHIPPED | OUTPATIENT
Start: 2024-11-15

## 2024-11-15 RX ORDER — PANTOPRAZOLE SODIUM 40 MG/1
40 TABLET, DELAYED RELEASE ORAL
Qty: 30 TABLET | Refills: 3 | Status: SHIPPED | OUTPATIENT
Start: 2024-11-16

## 2024-11-15 RX ORDER — POLYETHYLENE GLYCOL 3350 17 G/17G
17 POWDER, FOR SOLUTION ORAL DAILY PRN
Qty: 527 G | Refills: 1 | Status: SHIPPED | OUTPATIENT
Start: 2024-11-15

## 2024-11-15 RX ORDER — TRAZODONE HYDROCHLORIDE 50 MG/1
50 TABLET, FILM COATED ORAL NIGHTLY PRN
Qty: 30 TABLET | Refills: 3 | Status: SHIPPED | OUTPATIENT
Start: 2024-11-15

## 2024-11-15 RX ORDER — PSEUDOEPHEDRINE HCL 30 MG
100 TABLET ORAL 2 TIMES DAILY
Qty: 60 CAPSULE | Refills: 3 | Status: SHIPPED | OUTPATIENT
Start: 2024-11-15

## 2024-11-15 RX ADMIN — OXYCODONE 5 MG: 5 TABLET ORAL at 06:44

## 2024-11-15 RX ADMIN — ACETAMINOPHEN 650 MG: 325 TABLET ORAL at 21:31

## 2024-11-15 RX ADMIN — Medication 6 MG: at 21:27

## 2024-11-15 RX ADMIN — DOCUSATE SODIUM 100 MG: 100 CAPSULE, LIQUID FILLED ORAL at 09:51

## 2024-11-15 RX ADMIN — CYCLOBENZAPRINE 10 MG: 10 TABLET, FILM COATED ORAL at 09:45

## 2024-11-15 RX ADMIN — DICLOFENAC SODIUM 2 G: 10 GEL TOPICAL at 21:27

## 2024-11-15 RX ADMIN — CALCIUM 500 MG: 500 TABLET ORAL at 09:49

## 2024-11-15 RX ADMIN — TRAZODONE HYDROCHLORIDE 50 MG: 50 TABLET ORAL at 21:27

## 2024-11-15 RX ADMIN — Medication 5000 UNITS: at 09:50

## 2024-11-15 RX ADMIN — DICLOFENAC SODIUM 2 G: 10 GEL TOPICAL at 09:43

## 2024-11-15 RX ADMIN — SENNOSIDES 8.6 MG: 8.6 TABLET, FILM COATED ORAL at 21:27

## 2024-11-15 RX ADMIN — Medication 1 TABLET: at 09:51

## 2024-11-15 RX ADMIN — ACETAMINOPHEN 650 MG: 325 TABLET ORAL at 17:00

## 2024-11-15 RX ADMIN — Medication 12.5 MG: at 05:10

## 2024-11-15 RX ADMIN — FERROUS SULFATE TAB 325 MG (65 MG ELEMENTAL FE) 325 MG: 325 (65 FE) TAB at 09:50

## 2024-11-15 RX ADMIN — ACETAMINOPHEN 650 MG: 325 TABLET ORAL at 09:45

## 2024-11-15 RX ADMIN — ENOXAPARIN SODIUM 40 MG: 100 INJECTION SUBCUTANEOUS at 09:50

## 2024-11-15 RX ADMIN — DICLOFENAC SODIUM 2 G: 10 GEL TOPICAL at 17:01

## 2024-11-15 RX ADMIN — PANTOPRAZOLE SODIUM 40 MG: 40 TABLET, DELAYED RELEASE ORAL at 05:10

## 2024-11-15 RX ADMIN — DOCUSATE SODIUM 100 MG: 100 CAPSULE, LIQUID FILLED ORAL at 21:27

## 2024-11-15 RX ADMIN — OXYCODONE 7.5 MG: 5 TABLET ORAL at 01:26

## 2024-11-15 RX ADMIN — FERROUS SULFATE TAB 325 MG (65 MG ELEMENTAL FE) 325 MG: 325 (65 FE) TAB at 17:00

## 2024-11-15 RX ADMIN — CALCIUM 500 MG: 500 TABLET ORAL at 17:00

## 2024-11-15 ASSESSMENT — PAIN DESCRIPTION - INTENSITY: RATING_2: 5

## 2024-11-15 ASSESSMENT — PAIN SCALES - GENERAL
PAINLEVEL_OUTOF10: 6
PAINLEVEL_OUTOF10: 8
PAINLEVEL_OUTOF10: 7
PAINLEVEL_OUTOF10: 2
PAINLEVEL_OUTOF10: 6

## 2024-11-15 ASSESSMENT — PAIN DESCRIPTION - DESCRIPTORS
DESCRIPTORS: ACHING
DESCRIPTORS: ACHING

## 2024-11-15 ASSESSMENT — PAIN DESCRIPTION - PAIN TYPE
TYPE: ACUTE PAIN
TYPE: ACUTE PAIN

## 2024-11-15 ASSESSMENT — PAIN DESCRIPTION - LOCATION
LOCATION_2: BACK
LOCATION: GROIN
LOCATION: GROIN
LOCATION: HIP

## 2024-11-15 ASSESSMENT — PAIN DESCRIPTION - FREQUENCY: FREQUENCY: INTERMITTENT

## 2024-11-15 ASSESSMENT — PAIN - FUNCTIONAL ASSESSMENT
PAIN_FUNCTIONAL_ASSESSMENT: ACTIVITIES ARE NOT PREVENTED
PAIN_FUNCTIONAL_ASSESSMENT: PREVENTS OR INTERFERES SOME ACTIVE ACTIVITIES AND ADLS

## 2024-11-15 ASSESSMENT — PAIN DESCRIPTION - ONSET: ONSET: ON-GOING

## 2024-11-15 ASSESSMENT — PAIN DESCRIPTION - ORIENTATION
ORIENTATION_2: LOWER
ORIENTATION: LEFT
ORIENTATION: LEFT

## 2024-11-15 ASSESSMENT — PAIN SCALES - WONG BAKER: WONGBAKER_NUMERICALRESPONSE: HURTS A LITTLE BIT

## 2024-11-15 NOTE — PROGRESS NOTES
[facility]  Occupational Therapy  Daily Note    Discharge Recommendations: Home with Home Exercise Program  Equipment Recommendations:   Pt does not own any equiptment at this time; Shower chair ordered ([employer]).  Recommend grab bars around toilet and in shower    Time In: 1030  Time Out: 1200  Timed Code Treatment Minutes: 90 Minutes  Minutes: 90    Date: 11/15/2024  Patient Name: Linda Don,   Gender: female      Room: [address] [place] [address]  MRN: [id number]  : 1990  (34 y.o.)  Referring Practitioner: Ignacio Mota MD  Diagnosis: Multiple fractures of pelvis with stable disruption of pelvic ring, initial encounter for closed fracture (HCC)  Additional Pertinent Hx: Linda Don is a 34 y.o. right-handed -American female with no significant past medical history, was admitted on 2024 for intensive inpatient management of impairment & disability secondary to injuries sustained in a fall accident on the stair at home on 2024. The patient says she stayed over the night in a casino and returned home on early 2024 morning. While she was walking up the stairs to reach her condo apartment on the second floor, her foot slipped causing her to lose balance because of slippery wooden floor with presence of her sock.  She then fell down to the bottom of the stair (approximately 12 steps).  She says her forehead hit something and she blacked out briefly.  Initially she feels severe pain at her lower back, right knee and right ankle.  When she tried to get up using her left lower extremity, she felt severe pain at her left groin.  Her crying and screaming woke her boyfriend up.  After she calmed down, she asked her boyfriend to bring her to emergency room.  Therefore her boyfriend brought her to [facility] ER for evaluation.  CT of abdomen and pelvis, and lumbar spine were performed at [facility] ER.  She was found to have  Prior Education, Home Safety, Fall Prevention, and Assistive Device Safety    Goals  Short Term Goals  Time Frame for Short Term Goals: 1 week  Short Term Goal 1: GOAL MET  Short Term Goal 2: GOAL MET  Short Term Goal 3: Pt will tolerate standing for > 10 minutes with Mod I to increase ability to endure home maintainence tasks.  Short Term Goal 4: Pt will complete a 5 item retrieval task while incorporating good RW safety with MOD I to increase independence in home navigation.  Short Term Goal 5: GOAL MET  Long Term Goals  Time Frame for Long Term Goals : 2 weeks from initial OT eval  Long Term Goal 1: GOAL MET  Long Term Goal 2: Pt will complete a light IADL task with modified independence to increase independence in cooking tasks.  Long Term Goal 3: Pt will complete a simple community re-integration task with modified independence to increse independence in going to restaurants with boyfriend.  Long Term Goal 4: GOAL MET    Following session, patient left in safe position with all fall risk precautions in place.

## 2024-11-15 NOTE — PROGRESS NOTES
[facility]  Recreational Therapy  Discharge Note  Inpatient Rehabilitation Unit         Date:  11/15/2024       Patient Name: Linda Don      MRN: [id number]       YOB: 1990 (34 y.o.)       Gender: female     Referring Practitioner: Ignacio Mota MD    Patient discharged from Recreational Therapy at this time.  See recreational therapy notes for details.    Electronically signed by: Marilia Gallegos, CTRS  Date: 11/15/2024

## 2024-11-15 NOTE — PLAN OF CARE
Problem: Discharge Planning  Goal: Discharge to home or other facility with appropriate resources  11/15/2024 1154 by Lucita Mohr RN  Outcome: Progressing  Flowsheets (Taken 11/15/2024 1154)  Discharge to home or other facility with appropriate resources:   Identify barriers to discharge with patient and caregiver   Arrange for needed discharge resources and transportation as appropriate   Identify discharge learning needs (meds, wound care, etc)     Problem: Pain  Goal: Verbalizes/displays adequate comfort level or baseline comfort level  11/15/2024 1154 by Lucita Mohr RN  Outcome: Progressing  Flowsheets (Taken 11/15/2024 1154)  Verbalizes/displays adequate comfort level or baseline comfort level:   Encourage patient to monitor pain and request assistance   Assess pain using appropriate pain scale   Administer analgesics based on type and severity of pain and evaluate response     Problem: Skin/Tissue Integrity  Goal: Absence of new skin breakdown  Description: 1.  Monitor for areas of redness and/or skin breakdown  2.  Assess vascular access sites hourly  3.  Every 4-6 hours minimum:  Change oxygen saturation probe site  4.  Every 4-6 hours:  If on nasal continuous positive airway pressure, respiratory therapy assess nares and determine need for appliance change or resting period.  11/15/2024 1154 by Lucita Mohr RN  Outcome: Progressing     Problem: Respiratory - Adult  Goal: Achieves optimal ventilation and oxygenation  11/15/2024 1154 by Lucita Mohr RN  Outcome: Progressing  Flowsheets (Taken 11/15/2024 1154)  Achieves optimal ventilation and oxygenation:   Assess for changes in respiratory status   Assess for changes in mentation and behavior     Problem: Cardiovascular - Adult  Goal: Maintains optimal cardiac output and hemodynamic stability  11/15/2024 1154 by Lucita Mohr RN  Outcome: Progressing  Flowsheets (Taken 11/15/2024 1154)  Maintains optimal cardiac output and hemodynamic

## 2024-11-15 NOTE — DISCHARGE SUMMARY
Physical Medicine & Rehabilitation   Discharge Summary     Patient Identification:  Linda Don  : 1990  Admit date: 2024  Discharge date: 2024  Attending provider: Ignacio Mota MD        Primary care provider: No primary care provider on file.     Discharge Diagnoses:   Status post fall on 2024 at home resulting  Minimally superiomedially distracted right iliac wing nondisplaced fracture   Nondisplaced left superior pubic rami comminuted fracture  Nondisplaced left inferior pubic ramus fracture  Right forehead contusion  Mild traumatic brain injury/cerebral concussion with brief loss of consciousness  Bilateral anterior knees contusion with open skin abrasion  Bilateral posterior elbows contusion with open skin abrasion  Right anterior and lateral ankle sprain with skin open abrasion  Multiple skin abrasion at bilateral upper and lower extremities  Narcotic pain medication induced constipation  Anemia    Leukocytosis due to Staphylococcus aureus urinary tract infection      Discharge Functional Status:    Physical therapy:  Bed Mobility:  Rolling to Left: Modified Independent   Rolling to Right: Modified Independent   Supine to Sit: Modified Independent  Sit to Supine: Modified Independent      Transfers:  Sit to Stand: Modified Independent  Stand to Sit:Modified Independent  To/From Bed and Chair: Modified Independent  Car:Modified Independent     Ambulation:  Modified Independent  Distance: 10-15'x1 in room; ~250'x2; 150'x1;  multiple other short distances around therapy gym.   Surface: Level Tile and Ramp  Device: Rolling Walker  Gait Deviations: Forward Flexed Posture, Slow Sonia, Decreased Step Length Bilaterally, Decreased Weight Shift Left, Decreased Gait Speed, Decreased Heel Strike Bilaterally, Mild Path Deviations, Increased reliance on assistive device, and Cues for proximity to assistive device      Modified Independent  Distance: 15ft x1, 50ft x2  Surface: Level  [facility] ER for evaluation. CT of abdomen and pelvis, and lumbar spine were performed at [facility] ER. She was found to have significant pelvis fracture. The patient then was transferred to [facility] for further care. Images of abdomen/pelvis and the lumbar spine CT from [facility] were reviewed by radiologist. She was found to have nondisplaced right iliac bone fracture superiorly, posteriorly and medially, nondisplaced left superior pubic rami's comminuted fracture, and nondisplaced acute left inferior pubic ramus fracture. Because of multiple areas of skin abrasion wounds, additional x-ray of chest, left femur, right knee, left tibia/fibula, left wrist were done at [facility] on 11/2/2024 and showed no fracture or joint dislocation other than right knee soft tissue swelling. The patient was hospitalized. She was evaluated by orthopedic service. Nonoperative conservative treatment was recommended. The patient was initially placed on right lower extremity nonweightbearing restriction but was allowed weightbearing as tolerated on left lower extremity. However orthopedic service later allowed patient to have weightbearing as tolerated on bilateral lower extremities with usage of walker on 11/4/2024.       Inpatient Rehabilitation Course:   Linda Don is a 34 y.o. right-handed -American female with no significant past medical history, was admitted to inpatient rehabilitation on 11/5/2024 for  intensive inpatient management of impairment & disability secondary to injuries sustained in a fall accident on the stair at home on 11/2/2024.     The patient participated in an aggressive multidisciplinary inpatient rehabilitation program involving 3 hours per day, 5 days per week of rehabilitation.      Appropriate DVT prophylaxis options were considered throughout rehabilitation stay.    Dr Kovacs followed during the IPR stay for medical management    Because the  times daily as needed for Muscle spasms  Qty: 90 tablet, Refills: 3              Controlled substances monitoring: possible medication side effects, risk of tolerance and/or dependence, and alternative treatments discussed.     45 minutes spent preparing the patient for discharge      ESTELA KISER MD

## 2024-11-15 NOTE — PROGRESS NOTES
[facility]  INPATIENT PHYSICAL THERAPY  DAILY NOTE  [facility] - [id number]      Discharge Recommendations: Home with Assist as Needed and HEP  Equipment Recommendations: Yes (Continue to assess pending progress (Patient will likely require RW and possible manual whelchair))               Time In: 0730  Time Out: 0900  Timed Code Treatment Minutes: 90 Minutes  Minutes: 90          Date: 11/15/2024  Patient Name: Linda Don,  Gender:  female        MRN: [id number]  : 1990  (34 y.o.)     Referring Practitioner: Ignacio Mota MD  Diagnosis: Multiple fractures of pelvis with stable disruption of pelvic ring, initial encounter for closed fracture (HCC)  Additional Pertinent Hx: Per H&P \"Linda Don is a 34 y.o. right-handed -American female with no significant past medical history, is admitted to the inpatient rehabilitation unit on 2024 for intensive inpatient rehabilitation treatment of impairment and disability secondary to injuries sustained in a fall accident on the stairs at home on 2024.     The patient says she stayed over the night in a casino and returned home on early 2024 morning.  While she was walking up the stairs to reach her condo apartment on the second floor, her foot slipped causing her to lose balance because of slippery wooden floor with presence of her sock.  She then fell down to the bottom of the stair (approximately 12 steps).  She says her forehead hit something and she blacked out briefly.  Initially she feels severe pain at her lower back, right knee and right ankle.  When she tried to get up using her left lower extremity, she felt severe pain at her left groin.  Her crying and screaming woke her boyfriend up.  After she calmed down, she asked her boyfriend to bring her to emergency room.  Therefore her boyfriend brought her to [facility] ER for evaluation.  CT of abdomen and pelvis, and lumbar  Both hands on left ascending rail and Modified Independent.  Platform: 6\" platform X 1 using Rolling Walker and Modified Independent.    Balance:  Static Standing Balance: Modified Independent  Dynamic Standing Balance: Modified Independent  Pt. Able to  an object from floor using long handled reacher and Rolling walker for support with Modified Independent     -Standing on airrex pad while tapping blaze pods with hands and then progressed to tapping with BLE's to further challenge balance.     Exercise:  Patient was guided in 1 set(s) 10 reps of exercises to both lower extremities: Ankle pumps, Heelslides, and Hip abduction/adduction.  Exercises were completed for increased independence with functional mobility.    Functional Outcome Measures:  Timed Up and Go (TUG)  Current Score: 10.88 Seconds (Date: 11/15/2024)    Interpretation of Score: This tests the patient’s mobility and fall risk. Less than 10 seconds = freely mobile; <20 seconds = mostly independent; 20-29 seconds = variable mobility; > 20 seconds = impaired mobility; > 30 seconds = very high fall risk. Additional scorin.1 seconds in vestibular patients = increased fall risk; > 13.5 seconds in elderly = Increased fall risk)  Modified [place] Scale:  Not Applicable    ASSESSMENT:  Assessment: Patient progressing toward established goals.  Activity Tolerance:  Patient tolerance of  treatment:Good.  Plan: Current Treatment Recommendations: Strengthening, Balance training, Functional mobility training, Transfer training, Neuromuscular re-education, Endurance training, Home exercise program, Equipment evaluation, education, & procurement, Safety education & training, Patient/Caregiver education & training, Therapeutic activities, Pain management, Gait training, Stair training  General Plan:  (5x/wk 90 min)    Education:  Learners: Patient  Patient Education: Plan of Care, Precautions/Restrictions, Bed Mobility, Equipment Education, Transfers,

## 2024-11-15 NOTE — PLAN OF CARE
Problem: Discharge Planning  Goal: Discharge to home or other facility with appropriate resources  11/15/2024 0951 by Autumn Quiros LISW-S  Note: Patient to be discharged on Saturday, 11/16 to home. Patient will be under the supervision of her boyfriend, Raj. Family education was not provided during patient's stay. Patient will not be receiving services at discharge due to being Medicaid pending. SW completed [employer] on this date for DME and medications copay. No outstanding needs.    Transportation:   Has transportation kept you from medical appointments, meetings, work, or from getting things needed for daily living? (Check all that apply)  No.      Health Literacy:   How often do you need to have someone help you when you read instructions, pamphlets, or other written material from your doctor or pharmacy?  0. - Never    Social Isolation:  How often do you feel lonely or isolated from those around you?  0. Never      Patient Mood Interview (PHQ-2 to 9) (from [employer]©)   Say to Patient: \"Over the last 2 weeks, have you been bothered by any of the following problems?\"   If symptom is present, enter 1 (yes) in column 1 (Symptom Presence)  If yes in column 1, then ask the patient: “About how often have you been bothered by this?”  Read and show the patient a card with the symptom frequency choices.    Indicate response in column 2, Symptom Frequency.   Symptom Presence  No (enter 0 in column 2)   Yes (enter 0-3 in column 2)  9.    No response (leave column 2 blank) Symptom Frequency  Never or 1 day  2-6 days (several days)  7-11 days (half or more of the days)  12-14 days (nearly every day    Symptom Presence Symptom Frequency   Little interest or pleasure in doing things 0. No 0. Never or 1 day   Feeling down, depressed, or hopeless 0. No 0. Never or 1 day

## 2024-11-15 NOTE — PLAN OF CARE
Problem: Discharge Planning  Goal: Discharge to home or other facility with appropriate resources  11/14/2024 2232 by Kasie Medrano LPN  Outcome: Progressing  Flowsheets (Taken 11/14/2024 1943)  Discharge to home or other facility with appropriate resources:   Identify barriers to discharge with patient and caregiver   Arrange for needed discharge resources and transportation as appropriate   Arrange for interpreters to assist at discharge as needed     Problem: Pain  Goal: Verbalizes/displays adequate comfort level or baseline comfort level  11/14/2024 2232 by Kasie Medrano LPN  Outcome: Progressing     Problem: Skin/Tissue Integrity  Goal: Absence of new skin breakdown  Description: 1.  Monitor for areas of redness and/or skin breakdown  2.  Assess vascular access sites hourly  3.  Every 4-6 hours minimum:  Change oxygen saturation probe site  4.  Every 4-6 hours:  If on nasal continuous positive airway pressure, respiratory therapy assess nares and determine need for appliance change or resting period.  11/14/2024 2232 by Kasie Medrano LPN  Outcome: Progressing     Problem: Respiratory - Adult  Goal: Achieves optimal ventilation and oxygenation  11/14/2024 2232 by Kasie Medrano LPN  Outcome: Progressing  Flowsheets (Taken 11/14/2024 1943)  Achieves optimal ventilation and oxygenation:   Assess for changes in respiratory status   Assess for changes in mentation and behavior   Encourage broncho-pulmonary hygiene including cough, deep breathe, incentive spirometry   Assess the need for suctioning and aspirate as needed     Problem: Cardiovascular - Adult  Goal: Maintains optimal cardiac output and hemodynamic stability  11/14/2024 2232 by Kasie Medrano LPN  Outcome: Progressing  Flowsheets (Taken 11/14/2024 1943)  Maintains optimal cardiac output and hemodynamic stability:   Monitor blood pressure and heart rate   Monitor urine output and notify Licensed  ordered to ensure adequate hydration     Problem: Gastrointestinal - Adult  Goal: Maintains or returns to baseline bowel function  11/14/2024 2232 by Kasie Medrano LPN  Outcome: Progressing  Flowsheets (Taken 11/14/2024 1943)  Maintains or returns to baseline bowel function:   Assess bowel function   Encourage oral fluids to ensure adequate hydration   Encourage mobilization and activity     Problem: Genitourinary - Adult  Goal: Absence of urinary retention  11/14/2024 2232 by Kasie Medrano LPN  Outcome: Progressing  Flowsheets (Taken 11/14/2024 1943)  Absence of urinary retention:   Assess patient’s ability to void and empty bladder   Monitor intake/output and perform bladder scan as needed     Problem: Infection - Adult  Goal: Absence of infection at discharge  11/14/2024 2232 by Kasie Medrano LPN  Outcome: Progressing  Flowsheets (Taken 11/14/2024 1943)  Absence of infection at discharge:   Assess and monitor for signs and symptoms of infection   Monitor lab/diagnostic results   Monitor all insertion sites i.e., indwelling lines, tubes and drains   Monitor endotracheal (as able) and nasal secretions for changes in amount and color   Administer medications as ordered     Problem: Infection - Adult  Goal: Absence of infection during hospitalization  11/14/2024 2232 by Kasie Medrano LPN  Outcome: Progressing  Flowsheets (Taken 11/14/2024 1943)  Absence of infection during hospitalization:   Assess and monitor for signs and symptoms of infection   Monitor lab/diagnostic results   Monitor all insertion sites i.e., indwelling lines, tubes and drains   Monitor endotracheal (as able) and nasal secretions for changes in amount and color   Administer medications as ordered     Problem: Safety - Adult  Goal: Free from fall injury  11/14/2024 2232 by Kasie Medrano LPN  Outcome: Progressing     Problem: Nutrition Deficit:  Goal: Optimize nutritional status  11/14/2024 2232 by  Kasie Medrano LPN  Outcome: Progressing

## 2024-11-15 NOTE — PROGRESS NOTES
Physical Medicine & Rehabilitation Progress Note    Chief Complaint:  Fall on stairs resulting pain at lower back and left groin     Subjective:    Linda Don is a 34 y.o. right-handed -American female with no significant past medical history, was admitted on 11/5/2024 for intensive inpatient management of impairment & disability secondary to injuries sustained in a fall accident on the stair at home on 11/2/2024.     The patient says she stayed over the night in a casino and returned home on early 11/2/2024 morning.  While she was walking up the stairs to reach her condo apartment on the second floor, her foot slipped causing her to lose balance because of slippery wooden floor with presence of her sock.  She then fell down to the bottom of the stair (approximately 12 steps).  She says her forehead hit something and she blacked out briefly.  Initially she feels severe pain at her lower back, right knee and right ankle.  When she tried to get up using her left lower extremity, she felt severe pain at her left groin.  Her crying and screaming woke her boyfriend up.  After she calmed down, she asked her boyfriend to bring her to emergency room.  Therefore her boyfriend brought her to [facility] ER for evaluation.  CT of abdomen and pelvis, and lumbar spine were performed at [facility] ER.  She was found to have significant pelvis fracture.  The patient then was transferred to [facility] for further care.  Images of abdomen/pelvis and the lumbar spine CT from [facility] were reviewed by radiologist.  She was found to have nondisplaced right iliac bone fracture superiorly, posteriorly and medially, nondisplaced left superior pubic rami's comminuted fracture, and nondisplaced acute left inferior pubic ramus fracture.  Because of multiple areas of skin abrasion wounds, additional x-ray of chest, left femur, right knee, left tibia/fibula, left wrist were done at [facility]  resulting  Minimally superiomedially distracted right iliac wing nondisplaced fracture   Nondisplaced left superior pubic rami comminuted fracture  Nondisplaced left inferior pubic ramus fracture  Right forehead contusion  Mild traumatic brain injury/cerebral concussion with brief loss of consciousness  Bilateral anterior knees contusion with open skin abrasion  Bilateral posterior elbows contusion with open skin abrasion  Right anterior and lateral ankle sprain with skin open abrasion  Multiple skin abrasion at bilateral upper and lower extremities  Narcotic pain medication induced constipation  Anemia    Leukocytosis due to Staphylococcus aureus urinary tract infection    The patient's condition remains stable.  Her pain at her lower back and left groin area continue to persist but controlled with pain medication and ice pack application.  Her lower extremities muscle strain is improving slowly.  She is tolerating the intensive rehab treatment provided.  Her function continues to improved slowly and steadily.    The patient is scheduled to be discharged tomorrow, 11/16/2024.      Plan:  Continues intensive PT/OT/SLP/RT inpatient rehabilitation program at least 3 hours per day, 5 days per week in order to improve functional status prior to discharge.  Family education and training will be completed.  Equipment evaluations and recommendations will be completed as appropriate.       Rehabilitation nursing continue to be involved for bowel, bladder, skin, and pain management.  Nursing will also provide education and training to patient and family.    Prophylaxis:  DVT: Lovenox, PRIMITIVO stocking, intermittent pneumatic compression device.  GI: Colace, Senokot, Movantik, GlycoLax as needed, milk of magnesium as needed, Dulcolax suppository as needed, Zofran as needed  Pain: Tylenol, Voltaren gel application, Tylenol as needed, Flexeril as needed, oxycodone 5-7.5 mg as needed  Continue Neosporin ointment application to  abrasion wound  Continue Protonix for gastric protection  Continue multivitamin for anemia  Completed 5 days course Bactrim DS for Staphylococcus aureus urinary tract infection (ended on 11/13/2024)  Continue melatonin, trazodone as needed for insomnia  Nutrition: Continue regular diet.    Bladder: Monitoring signs or symptoms of UTI  Bowel: Monitoring signs or symptoms of constipation   and case management for coordination of care and discharge planning      Missed Therapy Time:  None      ESTELA KISER MD     This report has been created using voice recognition software. It may contain minor errors which are inherent in voice recognition technology

## 2024-11-16 VITALS
BODY MASS INDEX: 23.81 KG/M2 | OXYGEN SATURATION: 98 % | HEART RATE: 89 BPM | WEIGHT: 129.41 LBS | SYSTOLIC BLOOD PRESSURE: 105 MMHG | RESPIRATION RATE: 16 BRPM | HEIGHT: 62 IN | DIASTOLIC BLOOD PRESSURE: 62 MMHG | TEMPERATURE: 97.7 F

## 2024-11-16 PROCEDURE — 97535 SELF CARE MNGMENT TRAINING: CPT

## 2024-11-16 PROCEDURE — 6370000000 HC RX 637 (ALT 250 FOR IP): Performed by: PHYSICAL MEDICINE & REHABILITATION

## 2024-11-16 PROCEDURE — 97530 THERAPEUTIC ACTIVITIES: CPT

## 2024-11-16 PROCEDURE — 99239 HOSP IP/OBS DSCHRG MGMT >30: CPT | Performed by: PHYSICAL MEDICINE & REHABILITATION

## 2024-11-16 PROCEDURE — 6360000002 HC RX W HCPCS: Performed by: PHYSICAL MEDICINE & REHABILITATION

## 2024-11-16 PROCEDURE — 6370000000 HC RX 637 (ALT 250 FOR IP): Performed by: FAMILY MEDICINE

## 2024-11-16 RX ADMIN — ACETAMINOPHEN 650 MG: 325 TABLET ORAL at 06:10

## 2024-11-16 RX ADMIN — CALCIUM 500 MG: 500 TABLET ORAL at 10:01

## 2024-11-16 RX ADMIN — OXYCODONE 5 MG: 5 TABLET ORAL at 07:56

## 2024-11-16 RX ADMIN — ENOXAPARIN SODIUM 40 MG: 100 INJECTION SUBCUTANEOUS at 10:01

## 2024-11-16 RX ADMIN — Medication 5000 UNITS: at 10:01

## 2024-11-16 RX ADMIN — DOCUSATE SODIUM 100 MG: 100 CAPSULE, LIQUID FILLED ORAL at 10:01

## 2024-11-16 RX ADMIN — ACETAMINOPHEN 650 MG: 325 TABLET ORAL at 10:01

## 2024-11-16 RX ADMIN — Medication 1 TABLET: at 10:01

## 2024-11-16 RX ADMIN — PANTOPRAZOLE SODIUM 40 MG: 40 TABLET, DELAYED RELEASE ORAL at 06:10

## 2024-11-16 RX ADMIN — DICLOFENAC SODIUM 2 G: 10 GEL TOPICAL at 10:02

## 2024-11-16 RX ADMIN — FERROUS SULFATE TAB 325 MG (65 MG ELEMENTAL FE) 325 MG: 325 (65 FE) TAB at 10:01

## 2024-11-16 ASSESSMENT — PAIN SCALES - WONG BAKER: WONGBAKER_NUMERICALRESPONSE: NO HURT

## 2024-11-16 ASSESSMENT — PAIN SCALES - GENERAL
PAINLEVEL_OUTOF10: 4
PAINLEVEL_OUTOF10: 5

## 2024-11-16 ASSESSMENT — PAIN DESCRIPTION - ORIENTATION
ORIENTATION: LEFT
ORIENTATION: LEFT

## 2024-11-16 ASSESSMENT — PAIN - FUNCTIONAL ASSESSMENT: PAIN_FUNCTIONAL_ASSESSMENT: PREVENTS OR INTERFERES SOME ACTIVE ACTIVITIES AND ADLS

## 2024-11-16 ASSESSMENT — PAIN DESCRIPTION - DESCRIPTORS
DESCRIPTORS: ACHING
DESCRIPTORS: ACHING;TIGHTNESS

## 2024-11-16 ASSESSMENT — PAIN DESCRIPTION - LOCATION
LOCATION: GROIN
LOCATION: GROIN

## 2024-11-16 NOTE — CARE COORDINATION
Patient in bed eyes closed. Response to voice. Independent in room. Patient rate pain 7/10 in left groin. PRN Tylenol given. Will continue to monitor. Patient slept well this shift. No concern voiced.

## 2024-11-16 NOTE — PROGRESS NOTES
[facility]  Occupational Therapy  Discharge Note    Discharge Recommendations: Home with assist PRN and UB HEP   Equipment Recommendations:   Pt does not own any equiptment at this time; Shower chair ordered ([employer]).      Time In: 0800  Time Out: 08  Timed Code Treatment Minutes: 26 Minutes  Minutes: 26          Date: 2024  Patient Name: Linda Don,   Gender: female      Room: [address] [place] [address]  MRN: [id number]  : 1990  (34 y.o.)  Referring Practitioner: Ignacio Mota MD  Diagnosis: Multiple fractures of pelvis with stable disruption of pelvic ring, initial encounter for closed fracture (HCC)  Additional Pertinent Hx: Linda Don is a 34 y.o. right-handed -American female with no significant past medical history, was admitted on 2024 for intensive inpatient management of impairment & disability secondary to injuries sustained in a fall accident on the stair at home on 2024. The patient says she stayed over the night in a casino and returned home on early 2024 morning. While she was walking up the stairs to reach her condo apartment on the second floor, her foot slipped causing her to lose balance because of slippery wooden floor with presence of her sock.  She then fell down to the bottom of the stair (approximately 12 steps).  She says her forehead hit something and she blacked out briefly.  Initially she feels severe pain at her lower back, right knee and right ankle.  When she tried to get up using her left lower extremity, she felt severe pain at her left groin.  Her crying and screaming woke her boyfriend up.  After she calmed down, she asked her boyfriend to bring her to emergency room.  Therefore her boyfriend brought her to [facility] ER for evaluation.  CT of abdomen and pelvis, and lumbar spine were performed at [facility] ER.  She was found to have significant pelvis fracture.  The  independent level. Andree has improved her ADL performance to a complete modified independent level, including bathing, dressing, toileting, and grooming, including her ADL toilet / shower transfers. Andree is able to engage into various IADLs, including  activities, with modified independence with use of RW. Her standing tolerance is > 20 min within ADL / IADL tasks. Her occupational performance has improved AEB the modified barthel index to 100/100. She demo's good insight into safety and is able to vocalize fall prevention activities. Her supportive boyfriend is able to assist with community reintegration IADLs, such as grocery shopping PRN. Andree has an UB HEP that she demo's competency in. Plan is for her to discharge home with assist PRN and UB HEP.   Plan: Home with assist PRN and UB HEP     Education:  Learners: Patient  UB HEP     Goals  Patient goals : To be able to take care of son and do what she needs to do in a day    Time Frame for Short Term Goals: 1 week  Short Term Goal 1: GOAL MET   Short Term Goal 2: GOAL MET   Short Term Goal 3: Pt will tolerate standing for > 10 minutes with Mod I to increase ability to endure home maintainence tasks. GOAL MET  Short Term Goal 4: Pt will complete a 5 item retrieval task while incorporating good RW safety with MOD I to increase independence in home navigation. GOAL MET  Short Term Goal 5: GOAL MET     Time Frame for Long Term Goals : 2 weeks from initial OT eval  Long Term Goal 1: GOAL MET   Long Term Goal 2: Pt will complete a light IADL task with modified independence to increase independence in cooking tasks. GOAL MET  Long Term Goal 3: Pt will complete a simple community re-integration task with modified independence to increse independence in going to restaurants with boyfriend. GOAL MET  Long Term Goal 4: GOAL MET         Following session, patient left in safe position with all fall risk precautions in place.

## 2024-11-16 NOTE — PROGRESS NOTES
[facility]  INPATIENT PHYSICAL THERAPY  DISCHARGE NOTE  [facility] - 8K-14/014-A      Discharge Recommendations: Home with Assist as Needed and and HEP  Equipment Recommendations: Yes (Continue to assess pending progress (Patient will likely require RW and possible manual whelchair))               Time In: 08  Time Out: 0848  Timed Code Treatment Minutes: 13 Minutes  Minutes: 13          Date: 2024  Patient Name: Linda Don,  Gender:  female        MRN: [id number]  : 1990  (34 y.o.)     Referring Practitioner: Ignacio Mota MD  Diagnosis: Multiple fractures of pelvis with stable disruption of pelvic ring, initial encounter for closed fracture (HCC)  Additional Pertinent Hx: Per H&P \"Linda Dno is a 34 y.o. right-handed -American female with no significant past medical history, is admitted to the inpatient rehabilitation unit on 2024 for intensive inpatient rehabilitation treatment of impairment and disability secondary to injuries sustained in a fall accident on the stairs at home on 2024.     The patient says she [place] night in [place] and returned home on early 2024 morning.  While she was walking up the stairs to reach her condo apartment on the second floor, her foot slipped causing her to lose balance because of slippery wooden floor with presence of her sock.  She then fell down to the bottom of the stair (approximately 12 steps).  She says her forehead hit something and she blacked out briefly.  Initially she feels severe pain at her lower back, right knee and right ankle.  When she tried to get up using her left lower extremity, she felt severe pain at her left groin.  Her crying and screaming woke her boyfriend up.  After she calmed down, she asked her boyfriend to bring her to emergency room.  Therefore her boyfriend brought her to [facility] ER for evaluation.  CT of abdomen and pelvis, and  lumbar spine were performed at [facility] ER.  She was found to have significant pelvis fracture.  The patient then was transferred to [facility] for further care.  Images of abdomen/pelvis and the lumbar spine CT from [facility] were reviewed by radiologist.  She was found to have nondisplaced right iliac bone fracture superiorly, posteriorly and medially, nondisplaced left superior pubic rami's comminuted fracture, and nondisplaced acute left inferior pubic ramus fracture.  Because of multiple areas of skin abrasion wounds, additional x-ray of chest, left femur, right knee, left tibia/fibula, left wrist were done at [facility] on 11/2/2024 and showed no fracture or joint dislocation other than right knee soft tissue swelling.  The patient was hospitalized.  She was evaluated by orthopedic service.  Nonoperative conservative treatment was recommended.  The patient was initially placed on right lower extremity nonweightbearing restriction but was allowed weightbearing as tolerated on left lower extremity.  However orthopedic service later allowed patient to have weightbearing as tolerated on bilateral lower extremities with usage of walker on 11/4/2024. \"     Prior Level of Function:  Lives With: Other (comment)  Type of Home: Condo  Home Layout: One level  Home Access: Stairs to enter with rails  Entrance Stairs - Number of Steps: 2 flights of steps  Entrance Stairs - Rails: Left  Home Equipment: None   Bathroom Shower/Tub: Walk-in shower  Bathroom Toilet: Standard  Bathroom Equipment: None  Bathroom Accessibility: Walker accessible    Receives Help From: Family  ADL Assistance: Independent  Homemaking Assistance: Independent  Ambulation Assistance: Independent  Transfer Assistance: Independent  Active : Yes  IADL Comments: Pt responsible for cleaning, laundry, and some cooking. Pt's boyfriend helps with cooking and takes care of finances, and  to perform sit<>stand transfers with use of RW and SBA in order to assist with safety with transfers in home.- GOAL MET   Short Term Goal 3: Patient to ambulate >/=50 feet with use of RW and SBA in order to assist with ambulation in home.- GOAL MET   Short Term Goal 4: Patient to ascend/descend 6 inch step in parallel bars with CGA in order to assist with progression to stair negotiation.- GOAL MET   Short Term Goal 5: Patient to perform car transfer with CGA in order to assist with getting to and from appointments.- GOAL MET   Short Term Goal 6: Patient to perform TUG in </=10 seconds in order to assist with functional dynamic balance GOAL NOT MET                  Long Term Goals  Time Frame for Long Term Goals : 2 weeks from IPR evaluation  Long Term Goal 1: Patient to perform bed mobility supine<>sit with Mod I in order to assist with getting into and out of bed.- GOAL MET   Long Term Goal 2: Patient to perform sit<>stand transfers with use of RW and Mod I in order to assist with safety with transfers in home.- GOAL MET   Long Term Goal 3: Patient to ambulate >/=150 feet with use of RW and Mod I in order to assist with home mobility.-  GOAL MET   Long Term Goal 4: Patient to ascend/descend 14 steps with L handrail and Supervision in order to assist with home entry.- GOAL MET   Long Term Goal 5: Patient to perform car transfer with Mod I in order to assist with getting to and from appointments.- GOAL MET   Long term goal 6: Patient to ascend/descend 14 steps with L handrail and Mod I in order to assist with home entry. GOAL MET                      Following session, patient left in safe position with all fall risk precautions in place.

## 2024-11-16 NOTE — PROGRESS NOTES
Patient: Linda Don  Unit/Bed: 8K-14/014-A  YOB: 1990  MRN: [id number] Acct: [id number]   Admitting Diagnosis: TBI (traumatic brain injury) [S06.9XAA]  Multiple fractures of pelvis with stable disruption of pelvic ring, initial encounter for closed fracture (HCC) [S32.810A]  Admit Date:  11/5/2024  Hospital Day: 10    Assessment:     Principal Problem:    Multiple fractures of pelvis with stable disruption of pelvic ring, initial encounter for closed fracture (HCC)  Active Problems:    Abrasion of knee, bilateral    Abrasion of elbow, right, initial encounter    Abrasion of elbow, left, initial encounter    Closed fracture of multiple pubic rami, left, sequela    Anemia    Traumatic brain injury with brief (less than 1 hour) loss of consciousness    Sprain of anterior talofibular ligament of right ankle    Abrasion of right ankle    Forehead contusion    Acute cystitis without hematuria  Resolved Problems:    * No resolved hospital problems. *      Plan:     She is medically stable for discharge tomorrow        Subjective:     Patient has no complaint of CP or SOB..   Medication side effects: none    Scheduled Meds:   Vitamin D  5,000 Units Oral Daily    diclofenac sodium  2 g Topical 4x daily    calcium elemental  500 mg Oral BID WC    ferrous sulfate  325 mg Oral BID WC    sodium chloride flush  5-40 mL IntraVENous 2 times per day    acetaminophen  650 mg Oral Q6H    enoxaparin  40 mg SubCUTAneous Daily    pantoprazole  40 mg Oral QAM AC    docusate sodium  100 mg Oral BID    senna  1 tablet Oral Nightly    naloxegol  12.5 mg Oral QAM AC    melatonin  6 mg Oral Nightly    multivitamin  1 tablet Oral Daily     Continuous Infusions:   sodium chloride       PRN Meds:oxyCODONE **OR** oxyCODONE, sodium chloride flush, sodium chloride, cyclobenzaprine, bisacodyl, magnesium hydroxide, acetaminophen, ondansetron, traZODone, polyethylene glycol, diclofenac sodium    Review of Systems  Pertinent items  are noted in HPI.    Objective:     Patient Vitals for the past 8 hrs:   BP Temp Temp src Pulse Resp SpO2   11/15/24 2125 103/62 97.9 °F (36.6 °C) Oral 87 16 97 %     I/O last 3 completed shifts:  In: 860 [P.O.:860]  Out: -   I/O this shift:  In: 180 [P.O.:180]  Out: -     /62   Pulse 87   Temp 97.9 °F (36.6 °C) (Oral)   Resp 16   Ht 1.575 m (5' 2\")   Wt 58.7 kg (129 lb 6.6 oz)   LMP 10/17/2024 (Approximate)   SpO2 97%   BMI 23.67 kg/m²     General appearance: alert, appears stated age, and cooperative  Head: Normocephalic, without obvious abnormality, atraumatic  Lungs: clear to auscultation bilaterally  Heart: regular rate and rhythm, S1, S2 normal, no murmur, click, rub or gallop  Abdomen: soft, non-tender; bowel sounds normal; no masses,  no organomegaly  Extremities: extremities normal, atraumatic, no cyanosis or edema  Skin: Skin color, texture, turgor normal. No rashes or lesions  Neurologic:  weak    Electronically signed by Giancarlo Kovacs MD on 11/15/2024 at 10:03 PM

## 2024-11-16 NOTE — PROGRESS NOTES
Patient: Linda Don  Unit/Bed: 8K-14/014-A  YOB: 1990  MRN: [id number] Acct: [id number]   Admitting Diagnosis: TBI (traumatic brain injury) [S06.9XAA]  Multiple fractures of pelvis with stable disruption of pelvic ring, initial encounter for closed fracture (HCC) [S32.810A]  Admit Date:  11/5/2024  Hospital Day: 10    Assessment:     Principal Problem:    Multiple fractures of pelvis with stable disruption of pelvic ring, initial encounter for closed fracture (HCC)  Active Problems:    Abrasion of knee, bilateral    Abrasion of elbow, right, initial encounter    Abrasion of elbow, left, initial encounter    Closed fracture of multiple pubic rami, left, sequela    Anemia    Traumatic brain injury with brief (less than 1 hour) loss of consciousness    Sprain of anterior talofibular ligament of right ankle    Abrasion of right ankle    Forehead contusion    Acute cystitis without hematuria  Resolved Problems:    * No resolved hospital problems. *      Plan:     Continue to  follow        Subjective:     Patient has no complaint of CP or SOB..   Medication side effects: none    Scheduled Meds:   Vitamin D  5,000 Units Oral Daily    diclofenac sodium  2 g Topical 4x daily    calcium elemental  500 mg Oral BID WC    ferrous sulfate  325 mg Oral BID WC    sodium chloride flush  5-40 mL IntraVENous 2 times per day    acetaminophen  650 mg Oral Q6H    enoxaparin  40 mg SubCUTAneous Daily    pantoprazole  40 mg Oral QAM AC    docusate sodium  100 mg Oral BID    senna  1 tablet Oral Nightly    naloxegol  12.5 mg Oral QAM AC    melatonin  6 mg Oral Nightly    multivitamin  1 tablet Oral Daily     Continuous Infusions:   sodium chloride       PRN Meds:oxyCODONE **OR** oxyCODONE, sodium chloride flush, sodium chloride, cyclobenzaprine, bisacodyl, magnesium hydroxide, acetaminophen, ondansetron, traZODone, polyethylene glycol, diclofenac sodium    Review of Systems  Pertinent items are noted in

## 2024-11-16 NOTE — PLAN OF CARE
Problem: Discharge Planning  Goal: Discharge to home or other facility with appropriate resources  11/16/2024 1018 by Misa Millan LPN  Outcome: Completed  Note: Patient is being discharged today 11/16/24.     Problem: Pain  Goal: Verbalizes/displays adequate comfort level or baseline comfort level  11/16/2024 1018 by Misa Millan LPN  Outcome: Completed     Problem: Skin/Tissue Integrity  Goal: Absence of new skin breakdown  Description: 1.  Monitor for areas of redness and/or skin breakdown  2.  Assess vascular access sites hourly  3.  Every 4-6 hours minimum:  Change oxygen saturation probe site  4.  Every 4-6 hours:  If on nasal continuous positive airway pressure, respiratory therapy assess nares and determine need for appliance change or resting period.  11/16/2024 1018 by Misa Millan LPN  Outcome: Completed     Problem: Respiratory - Adult  Goal: Achieves optimal ventilation and oxygenation  Outcome: Completed     Problem: Cardiovascular - Adult  Goal: Maintains optimal cardiac output and hemodynamic stability  Outcome: Completed     Problem: Skin/Tissue Integrity - Adult  Goal: Skin integrity remains intact  11/16/2024 1018 by Misa Millan LPN  Outcome: Completed     Problem: Skin/Tissue Integrity - Adult  Goal: Incisions, wounds, or drain sites healing without S/S of infection  11/16/2024 1018 by Misa Millan LPN  Outcome: Completed     Problem: Musculoskeletal - Adult  Goal: Return mobility to safest level of function  11/16/2024 1018 by Misa Millan LPN  Outcome: Completed     Problem: Musculoskeletal - Adult  Goal: Maintain proper alignment of affected body part  11/16/2024 1018 by Misa Millan LPN  Outcome: Completed     Problem: Gastrointestinal - Adult  Goal: Minimal or absence of nausea and vomiting  11/16/2024 1018 by Misa Millan LPN  Outcome: Completed     Problem: Gastrointestinal - Adult  Goal: Maintains or returns to

## 2024-11-16 NOTE — DISCHARGE INSTR - COC
Continuity of Care Form    Patient Name: Linda Don   :  1990  MRN:  [id number]    Admit date:  2024  Discharge date:  24    Code Status Order: Full Code   Advance Directives:   Advance Care Flowsheet Documentation             Admitting Physician:  Ignacio Mota MD  PCP: No primary care provider on file.    Discharging Nurse: iMsa DILLON  Discharging Hospital Unit/Room#: 8K-14/014-A  Discharging Unit Phone Number: [phone]    Emergency Contact:   Extended Emergency Contact Information  Primary Emergency Contact: Raj Malcolm  Home Phone: [phone]  Relation: Boyfriend  Preferred language: English   needed? No  Secondary Emergency Contact: Nighat Quiroz  Home Phone: [phone]  Relation: Parent    Past Surgical History:  No past surgical history on file.    Immunization History:   Immunization History   Administered Date(s) Administered    TDaP, ADACEL (age 10y-64y), BOOSTRIX (age 10y+), IM, 0.5mL 2024       Active Problems:  Patient Active Problem List   Diagnosis Code    Multiple closed fractures of pelvis with stable disruption of pelvic ring, initial encounter ([facility]) S32.810A    Abrasion of knee, bilateral S80.211A, S80.212A    Abrasion of right chest wall S20.311A    Abrasion of elbow, right, initial encounter S50.311A    Abrasion of elbow, left, initial encounter S50.312A    Closed fracture of multiple pubic rami, left, sequela S32.592S    Multiple fractures of pelvis with stable disruption of pelvic ring, initial encounter for closed fracture ([facility]) S32.810A    Anemia D64.9    Traumatic brain injury with brief (less than 1 hour) loss of consciousness S06.9X9A    Sprain of anterior talofibular ligament of right ankle S93.491A    Abrasion of right ankle S90.511A    Forehead contusion S00.83XA    Acute cystitis without hematuria N30.00       Isolation/Infection:   Isolation            No Isolation          Patient Infection Status       None to display             Nurse Assessment:  Last Vital Signs: /62   Pulse 87   Temp 97.9 °F (36.6 °C) (Oral)   Resp 16   Ht 1.575 m (5' 2\")   Wt 58.7 kg (129 lb 6.6 oz)   LMP 10/17/2024 (Approximate)   SpO2 97%   BMI 23.67 kg/m²     Last documented pain score (0-10 scale): Pain Level: 5  Last Weight:   Wt Readings from Last 1 Encounters:   11/10/24 58.7 kg (129 lb 6.6 oz)     Mental Status:  oriented and alert    IV Access:  - None    Nursing Mobility/ADLs:  Walking   Independent  Transfer  Independent  Bathing  Independent  Dressing  Assisted  Toileting  Independent  Feeding  Independent  Med Admin  Independent  Med Delivery   whole    Wound Care Documentation and Therapy:  Wound 11/05/24 Knee Left;Anterior (Active)   Wound Image    11/09/24 1314   Wound Etiology Traumatic 11/15/24 2125   Dressing Status Clean;Intact;Dry 11/15/24 2125   Wound Cleansed Wound cleanser 11/12/24 1329   Dressing/Treatment Foam 11/15/24 2125   Dressing Change Due 11/14/24 11/12/24 1329   Wound Length (cm) 5.5 cm 11/09/24 1314   Wound Width (cm) 2.5 cm 11/09/24 1314   Wound Depth (cm) 0.1 cm 11/07/24 1248   Wound Surface Area (cm^2) 13.75 cm^2 11/09/24 1314   Change in Wound Size % (l*w) -66.67 11/09/24 1314   Wound Volume (cm^3) 0.6 cm^3 11/07/24 1248   Wound Assessment Other (Comment) 11/15/24 2125   Drainage Amount None (dry) 11/15/24 0930   Drainage Description Serosanguinous 11/07/24 1248   Odor None 11/15/24 2125   Anuradha-wound Assessment Blanchable erythema 11/07/24 1248   Margins Attached edges 11/07/24 1248   Wound Thickness Description not for Pressure Injury Partial thickness 11/07/24 1248   Number of days: 10       Wound 11/05/24 Knee Right;Anterior (Active)   Wound Image   11/07/24 1248   Wound Etiology Traumatic 11/15/24 2125   Dressing Status Clean;Dry;Intact 11/15/24 2125   Wound Cleansed Wound cleanser 11/12/24 1329   Dressing/Treatment Foam 11/15/24 2125   Dressing Change Due 11/14/24 11/12/24 1329   Wound Length (cm) 3 cm

## 2024-11-16 NOTE — PLAN OF CARE
Problem: Discharge Planning  Goal: Discharge to home or other facility with appropriate resources  11/16/2024 0109 by Isidoro Bolton RN  Outcome: Progressing  Flowsheets (Taken 11/16/2024 0109)  Discharge to home or other facility with appropriate resources: Identify barriers to discharge with patient and caregiver     Problem: Pain  Goal: Verbalizes/displays adequate comfort level or baseline comfort level  11/16/2024 0109 by Isidoro Bolton RN  Outcome: Progressing  Flowsheets (Taken 11/16/2024 0109)  Verbalizes/displays adequate comfort level or baseline comfort level:   Encourage patient to monitor pain and request assistance   Assess pain using appropriate pain scale   Administer analgesics based on type and severity of pain and evaluate response   Implement non-pharmacological measures as appropriate and evaluate response     Problem: Skin/Tissue Integrity  Goal: Absence of new skin breakdown  Description: 1.  Monitor for areas of redness and/or skin breakdown  2.  Assess vascular access sites hourly  3.  Every 4-6 hours minimum:  Change oxygen saturation probe site  4.  Every 4-6 hours:  If on nasal continuous positive airway pressure, respiratory therapy assess nares and determine need for appliance change or resting period.  11/16/2024 0109 by Isidoro Bolton RN  Outcome: Progressing     Problem: Skin/Tissue Integrity - Adult  Goal: Skin integrity remains intact  11/16/2024 0109 by Isidoro Bolton RN  Outcome: Progressing  Flowsheets  Taken 11/16/2024 0109  Skin Integrity Remains Intact: Monitor for areas of redness and/or skin breakdown  Taken 11/16/2024 0108  Skin Integrity Remains Intact: Monitor for areas of redness and/or skin breakdown     Problem: Skin/Tissue Integrity - Adult  Goal: Incisions, wounds, or drain sites healing without S/S of infection  11/16/2024 0109 by Isidoro Bolton, RN  Outcome: Progressing  Flowsheets (Taken 11/16/2024 0108)  Incisions, Wounds, or Drain Sites  Progressing  Flowsheets (Taken 11/16/2024 0109)  Nutrient intake appropriate for improving, restoring, or maintaining nutritional needs: Assess nutritional status and recommend course of action